# Patient Record
Sex: MALE | Race: WHITE | NOT HISPANIC OR LATINO | Employment: OTHER | URBAN - METROPOLITAN AREA
[De-identification: names, ages, dates, MRNs, and addresses within clinical notes are randomized per-mention and may not be internally consistent; named-entity substitution may affect disease eponyms.]

---

## 2023-08-24 ENCOUNTER — HOSPITAL ENCOUNTER (EMERGENCY)
Facility: HOSPITAL | Age: 78
Discharge: HOME | End: 2023-08-24
Attending: EMERGENCY MEDICINE
Payer: OTHER GOVERNMENT

## 2023-08-24 VITALS
HEIGHT: 66 IN | RESPIRATION RATE: 25 BRPM | OXYGEN SATURATION: 94 % | TEMPERATURE: 98 F | BODY MASS INDEX: 29.73 KG/M2 | SYSTOLIC BLOOD PRESSURE: 127 MMHG | WEIGHT: 185 LBS | HEART RATE: 79 BPM | DIASTOLIC BLOOD PRESSURE: 67 MMHG

## 2023-08-24 DIAGNOSIS — N30.00 ACUTE CYSTITIS WITHOUT HEMATURIA: Primary | ICD-10-CM

## 2023-08-24 LAB
ALBUMIN SERPL BCP-MCNC: 4.1 G/DL (ref 3.5–5.2)
ALP SERPL-CCNC: 85 U/L (ref 55–135)
ALT SERPL W/O P-5'-P-CCNC: 23 U/L (ref 10–44)
ANION GAP SERPL CALC-SCNC: 9 MMOL/L (ref 8–16)
AST SERPL-CCNC: 21 U/L (ref 10–40)
BACTERIA #/AREA URNS HPF: ABNORMAL /HPF
BASOPHILS # BLD AUTO: 0.07 K/UL (ref 0–0.2)
BASOPHILS NFR BLD: 0.6 % (ref 0–1.9)
BILIRUB SERPL-MCNC: 1.1 MG/DL (ref 0.1–1)
BILIRUB UR QL STRIP: NEGATIVE
BUN SERPL-MCNC: 17 MG/DL (ref 8–23)
CALCIUM SERPL-MCNC: 9.1 MG/DL (ref 8.7–10.5)
CHLORIDE SERPL-SCNC: 103 MMOL/L (ref 95–110)
CLARITY UR: ABNORMAL
CO2 SERPL-SCNC: 22 MMOL/L (ref 23–29)
COLOR UR: YELLOW
CREAT SERPL-MCNC: 1 MG/DL (ref 0.5–1.4)
DIFFERENTIAL METHOD: ABNORMAL
EOSINOPHIL # BLD AUTO: 0 K/UL (ref 0–0.5)
EOSINOPHIL NFR BLD: 0.3 % (ref 0–8)
ERYTHROCYTE [DISTWIDTH] IN BLOOD BY AUTOMATED COUNT: 13.2 % (ref 11.5–14.5)
EST. GFR  (NO RACE VARIABLE): >60 ML/MIN/1.73 M^2
GLUCOSE SERPL-MCNC: 147 MG/DL (ref 70–110)
GLUCOSE UR QL STRIP: ABNORMAL
HCT VFR BLD AUTO: 45.6 % (ref 40–54)
HGB BLD-MCNC: 15.2 G/DL (ref 14–18)
HGB UR QL STRIP: ABNORMAL
HYALINE CASTS #/AREA URNS LPF: 3 /LPF
IMM GRANULOCYTES # BLD AUTO: 0.06 K/UL (ref 0–0.04)
IMM GRANULOCYTES NFR BLD AUTO: 0.5 % (ref 0–0.5)
KETONES UR QL STRIP: ABNORMAL
LACTATE SERPL-SCNC: 1.2 MMOL/L (ref 0.5–1.9)
LEUKOCYTE ESTERASE UR QL STRIP: ABNORMAL
LIPASE SERPL-CCNC: 21 U/L (ref 4–60)
LYMPHOCYTES # BLD AUTO: 0.7 K/UL (ref 1–4.8)
LYMPHOCYTES NFR BLD: 5.5 % (ref 18–48)
MCH RBC QN AUTO: 28.9 PG (ref 27–31)
MCHC RBC AUTO-ENTMCNC: 33.3 G/DL (ref 32–36)
MCV RBC AUTO: 87 FL (ref 82–98)
MICROSCOPIC COMMENT: ABNORMAL
MONOCYTES # BLD AUTO: 0.9 K/UL (ref 0.3–1)
MONOCYTES NFR BLD: 7.3 % (ref 4–15)
NEUTROPHILS # BLD AUTO: 10.7 K/UL (ref 1.8–7.7)
NEUTROPHILS NFR BLD: 85.8 % (ref 38–73)
NITRITE UR QL STRIP: NEGATIVE
NRBC BLD-RTO: 0 /100 WBC
PH UR STRIP: 6 [PH] (ref 5–8)
PLATELET # BLD AUTO: 235 K/UL (ref 150–450)
PMV BLD AUTO: 9.8 FL (ref 9.2–12.9)
POTASSIUM SERPL-SCNC: 3.8 MMOL/L (ref 3.5–5.1)
PROT SERPL-MCNC: 8 G/DL (ref 6–8.4)
PROT UR QL STRIP: ABNORMAL
RBC # BLD AUTO: 5.26 M/UL (ref 4.6–6.2)
RBC #/AREA URNS HPF: 44 /HPF (ref 0–4)
SODIUM SERPL-SCNC: 134 MMOL/L (ref 136–145)
SP GR UR STRIP: 1.02 (ref 1–1.03)
SQUAMOUS #/AREA URNS HPF: 0 /HPF
URN SPEC COLLECT METH UR: ABNORMAL
UROBILINOGEN UR STRIP-ACNC: NEGATIVE EU/DL
WBC # BLD AUTO: 12.45 K/UL (ref 3.9–12.7)
WBC #/AREA URNS HPF: >100 /HPF (ref 0–5)
YEAST URNS QL MICRO: ABNORMAL

## 2023-08-24 PROCEDURE — 93010 EKG 12-LEAD: ICD-10-PCS | Mod: ,,, | Performed by: SPECIALIST

## 2023-08-24 PROCEDURE — 63600175 PHARM REV CODE 636 W HCPCS: Performed by: EMERGENCY MEDICINE

## 2023-08-24 PROCEDURE — 25000003 PHARM REV CODE 250: Performed by: EMERGENCY MEDICINE

## 2023-08-24 PROCEDURE — 93005 ELECTROCARDIOGRAM TRACING: CPT | Performed by: SPECIALIST

## 2023-08-24 PROCEDURE — 85025 COMPLETE CBC W/AUTO DIFF WBC: CPT

## 2023-08-24 PROCEDURE — 87040 BLOOD CULTURE FOR BACTERIA: CPT | Mod: 59 | Performed by: EMERGENCY MEDICINE

## 2023-08-24 PROCEDURE — 96366 THER/PROPH/DIAG IV INF ADDON: CPT

## 2023-08-24 PROCEDURE — 36415 COLL VENOUS BLD VENIPUNCTURE: CPT | Performed by: EMERGENCY MEDICINE

## 2023-08-24 PROCEDURE — 80053 COMPREHEN METABOLIC PANEL: CPT

## 2023-08-24 PROCEDURE — 83605 ASSAY OF LACTIC ACID: CPT | Performed by: EMERGENCY MEDICINE

## 2023-08-24 PROCEDURE — 87186 SC STD MICRODIL/AGAR DIL: CPT | Performed by: EMERGENCY MEDICINE

## 2023-08-24 PROCEDURE — 87086 URINE CULTURE/COLONY COUNT: CPT | Performed by: EMERGENCY MEDICINE

## 2023-08-24 PROCEDURE — 99285 EMERGENCY DEPT VISIT HI MDM: CPT | Mod: 25

## 2023-08-24 PROCEDURE — 25500020 PHARM REV CODE 255: Performed by: EMERGENCY MEDICINE

## 2023-08-24 PROCEDURE — 87077 CULTURE AEROBIC IDENTIFY: CPT | Performed by: EMERGENCY MEDICINE

## 2023-08-24 PROCEDURE — 83690 ASSAY OF LIPASE: CPT

## 2023-08-24 PROCEDURE — 93010 ELECTROCARDIOGRAM REPORT: CPT | Mod: ,,, | Performed by: SPECIALIST

## 2023-08-24 PROCEDURE — 81001 URINALYSIS AUTO W/SCOPE: CPT | Performed by: EMERGENCY MEDICINE

## 2023-08-24 PROCEDURE — 96365 THER/PROPH/DIAG IV INF INIT: CPT | Mod: 59

## 2023-08-24 RX ORDER — SODIUM CHLORIDE 9 MG/ML
1000 INJECTION, SOLUTION INTRAVENOUS
Status: COMPLETED | OUTPATIENT
Start: 2023-08-24 | End: 2023-08-24

## 2023-08-24 RX ORDER — ACETAMINOPHEN 325 MG/1
650 TABLET ORAL
Status: COMPLETED | OUTPATIENT
Start: 2023-08-24 | End: 2023-08-24

## 2023-08-24 RX ORDER — CEFUROXIME AXETIL 500 MG/1
500 TABLET ORAL 2 TIMES DAILY
Qty: 20 TABLET | Refills: 0 | Status: ON HOLD | OUTPATIENT
Start: 2023-08-24 | End: 2023-08-31 | Stop reason: HOSPADM

## 2023-08-24 RX ADMIN — PIPERACILLIN AND TAZOBACTAM 4.5 G: 4; .5 INJECTION, POWDER, LYOPHILIZED, FOR SOLUTION INTRAVENOUS; PARENTERAL at 10:08

## 2023-08-24 RX ADMIN — SODIUM CHLORIDE 1000 ML: 0.9 INJECTION, SOLUTION INTRAVENOUS at 10:08

## 2023-08-24 RX ADMIN — IOHEXOL 100 ML: 350 INJECTION, SOLUTION INTRAVENOUS at 10:08

## 2023-08-24 RX ADMIN — ACETAMINOPHEN 650 MG: 325 TABLET ORAL at 10:08

## 2023-08-24 NOTE — ED NOTES
STATES ABDOMINAL PAIN, FEVER, AND INTERMITTENT URINARY URGENCY AND FREQUENCY.  STATES CATHETER REMOVED SEVERAL DAYS AGO.

## 2023-08-24 NOTE — ED PROVIDER NOTES
Encounter Date: 8/24/2023       History     Chief Complaint   Patient presents with    Fever    Abdominal Pain     Dysuria without hematuria, recent urologic surgery/procedure on 16th. Wife reports fever this morning and no BM since before surgery.      Patient presents complaining of frequency, urgency.  Patient is status post recent TURP procedure approximately a little over 1 week ago.  Patient noticed increasing frequency and urgency yesterday and this morning got worse.  At the worst symptoms are mild-to-moderate.      Review of patient's allergies indicates:  No Known Allergies  No past medical history on file.  No past surgical history on file.  No family history on file.     Review of Systems   All other systems reviewed and are negative.      Physical Exam     Initial Vitals [08/24/23 0804]   BP Pulse Resp Temp SpO2   (!) 143/83 101 16 99.3 °F (37.4 °C) (!) 93 %      MAP       --         Physical Exam    Nursing note and vitals reviewed.  Constitutional: He appears well-developed and well-nourished. He is not diaphoretic. No distress.   Pleasant, polite.   HENT:   Head: Normocephalic and atraumatic.   Mouth/Throat: Oropharynx is clear and moist.   Eyes: EOM are normal.   Neck: Neck supple.   Normal range of motion.  Cardiovascular:  Intact distal pulses.           Pulmonary/Chest: No respiratory distress.   Musculoskeletal:         General: Normal range of motion.      Cervical back: Normal range of motion and neck supple.     Neurological: He is alert and oriented to person, place, and time. He has normal strength.   Skin: Skin is warm and dry.   Psychiatric: He has a normal mood and affect. His behavior is normal. Judgment and thought content normal.         ED Course   Procedures  Labs Reviewed   CBC W/ AUTO DIFFERENTIAL - Abnormal; Notable for the following components:       Result Value    Gran # (ANC) 10.7 (*)     Immature Grans (Abs) 0.06 (*)     Lymph # 0.7 (*)     Gran % 85.8 (*)     Lymph % 5.5  (*)     All other components within normal limits   COMPREHENSIVE METABOLIC PANEL - Abnormal; Notable for the following components:    Sodium 134 (*)     CO2 22 (*)     Glucose 147 (*)     Total Bilirubin 1.1 (*)     All other components within normal limits   URINALYSIS, REFLEX TO URINE CULTURE - Abnormal; Notable for the following components:    Appearance, UA Hazy (*)     Protein, UA 1+ (*)     Glucose, UA 4+ (*)     Ketones, UA 1+ (*)     Occult Blood UA 3+ (*)     Leukocytes, UA 3+ (*)     All other components within normal limits    Narrative:     Specimen Source->Urine   URINALYSIS MICROSCOPIC - Abnormal; Notable for the following components:    RBC, UA 44 (*)     WBC, UA >100 (*)     Hyaline Casts, UA 3 (*)     All other components within normal limits    Narrative:     Specimen Source->Urine   CULTURE, BLOOD   CULTURE, BLOOD   CULTURE, URINE   LIPASE   LACTIC ACID, PLASMA   URINALYSIS, REFLEX TO URINE CULTURE   LACTIC ACID, PLASMA        ECG Results              EKG 12-lead (In process)  Result time 08/24/23 09:19:12      In process by Interface, Lab In [facility] (08/24/23 09:19:12)                   Narrative:    Test Reason : K30,    Vent. Rate : 094 BPM     Atrial Rate : 094 BPM     P-R Int : 156 ms          QRS Dur : 086 ms      QT Int : 338 ms       P-R-T Axes : 040 -42 055 degrees     QTc Int : 422 ms    Normal sinus rhythm  Left axis deviation  Abnormal ECG  When compared with ECG of 25-OCT-2007 09:09,  Vent. rate has increased BY  35 BPM  The axis Shifted left    Referred By: [email or web address]   SELF           Confirmed By:                                   Imaging Results              CT Abdomen Pelvis With Contrast (Final result)  Result time 08/24/23 11:05:16      Final result by Hattie Hernandez MD (08/24/23 11:05:16)                   Narrative:    All CT scans at this facility used dose modulation, iterative reconstruction and/or weight-based dosing when appropriate to reduce radiation doses  as low  as reasonably achievable.    HISTORY: Abdominal abscess/infection suspected; post turp w abdominal pain    FINDINGS: Axial postcontrast imaging was performed with 100 cc Omnipaque 350 IV contrast .    CT ABDOMEN: The lung bases are clear. There is coronary artery calcification.  Liver: Normal enhancement. There are 2 indeterminate subcentimeter hypodense lesions within the dome of the liver which are too small to categorize and may represent small cysts or hemangiomas. There is no biliary duct dilatation.  Spleen: Normal in size and enhancement. There is a 5 mm hypodense lesion within the posterior spleen suggestive of small cysts  Pancreas: Normal  Gallbladder: Normal  Adrenal glands: Normal    Kidneys: Symmetric in size and enhancement. There are no renal stones or solid renal masses. There is a 4 cm well marginated simple right renal cysts.    There are no thick-walled or dilated bowel loops. There is colonic diverticulosis without diverticulitis.  There is no mesenteric or retroperitoneal adenopathy.  There is an infrarenal abdominal aortic aneurysm measuring 3.5 x 2.8 cm. There is diffuse vascular calcification of the aorta    The musculature is normal. There are no acute osseous abnormalities.  There is a small fat-containing umbilical hernia.    CT PELVIS: The bladder is distended. There is minimal air within the bladder. There is irregular wall thickening along the left side of the bladder. There is mild stranding in the adjacent fat.  Findings may be secondary to cystitis or recent surgery. Malignancy cannot be completely excluded.    The prostate gland is mildly enlarged. There is no pelvic adenopathy or free fluid.    There are small fat-containing right inguinal hernia.      IMPRESSION: Distended bladder with small amount of fluid presumably secondary to recent surgery. There is irregular wall thickening of the left side of the bladder with associated stranding in the adjacent fat. While this may be  secondary to cystitis and recent surgery malignancy cannot be completely excluded and correlation with cystoscopy is recommended    Colonic diverticulosis without diverticulitis    4 cm simple right renal cyst for which no further workup is recommended    Subcentimeter hypodense lesions within the dome of the liver most likely representing small cysts or hemangiomas. Outpatient follow-up ultrasound is recommended    3.5 x 2.8 cm infrarenal abdominal aortic aneurysm    Prostatic hypertrophy    Electronically signed by:  Hattie Hernandez MD  8/24/2023 11:05 AM CDT Workstation: [id number]                                     Medications   0.9%  NaCl infusion (1,000 mLs Intravenous New Bag 8/24/23 1053)   piperacillin-tazobactam 4.5 g in dextrose 5 % 100 mL IVPB (ready to mix) (4.5 g Intravenous New Bag 8/24/23 1054)   acetaminophen tablet 650 mg (650 mg Oral Given 8/24/23 1054)   iohexoL (OMNIPAQUE 350) injection 100 mL (100 mLs Intravenous Given 8/24/23 1015)     Medical Decision Making  Considerations include but are not limited to acute kidney injury, dehydration, electrolyte abnormalities, urinary tract infection, abscess, pyelonephritis, sepsis    [facility]    Patient presents for emergent evaluation of acute frequency and urgency that poses a threat to life and/or bodily function.    In the ED patient found to have acute urinary tract infection.    I ordered labs and personally reviewed them.  Labs significant for normal lactate of 1.2.     I ordered CT scan and personally reviewed it and reviewed the radiologist interpretation.  CT significant for evidence of cystitis and possible mass.      Discharge MDM    Patient was managed in the ED with IV Zosyn.    The response to treatment was improved.  Patient's CT imaging with evidence of cystitis and possible mass.  Patient has previously been diagnosed with prostate cancer.  Patient was advised she should follow up with his urologist.  He was given IV Zosyn in the  emergency department and does not appear to have any evidence of sepsis.  Patient's lactate is normal he has normal vital signs.  He was advised detailed reasons to return.  He was given an outpatient prescription for Ceftin.  Patient was discharged in stable condition.  Detailed return precautions discussed.    Amount and/or Complexity of Data Reviewed  Labs: ordered. Decision-making details documented in ED Course.  Radiology: ordered.    Risk  OTC drugs.  Prescription drug management.               ED Course as of 08/24/23 1250   Thu Aug 24, 2023   1113 WBC: 12.45 [AP]   1113 Hemoglobin: 15.2 [AP]   1113 Hematocrit: 45.6 [AP]   1113 Platelets: 235 [AP]   1113 Sodium(!): 134 [AP]   1113 Potassium: 3.8 [AP]   1113 Chloride: 103 [AP]   1113 CO2(!): 22 [AP]   1113 Glucose(!): 147 [AP]   1113 BUN: 17 [AP]   1113 Creatinine: 1.0 [AP]   1113 Calcium: 9.1 [AP]   1113 PROTEIN TOTAL: 8.0 [AP]   1113 Albumin: 4.1 [AP]   1113 BILIRUBIN TOTAL(!): 1.1 [AP]   1113 Alkaline Phosphatase: 85 [AP]   1113 AST: 21 [AP]   1113 ALT: 23 [AP]   1113 RBC, UA(!): 44 [AP]   1113 WBC, UA(!): >100 [AP]   1113 Bacteria, UA: Rare [AP]   1113 Yeast, UA: None [AP]   1113 Squam Epithel, UA: 0 [AP]   1113 Hyaline Casts, UA(!): 3 [AP]      ED Course User Index  [AP] Reggie Steen MD                    Clinical Impression:   Final diagnoses:  [N30.00] Acute cystitis without hematuria (Primary)        ED Disposition Condition    Discharge Stable          ED Prescriptions       Medication Sig Dispense Start Date End Date Auth. Provider    cefUROXime (CEFTIN) 500 MG tablet Take 1 tablet (500 mg total) by mouth 2 (two) times daily. for 10 days 20 tablet 8/24/2023 9/3/2023 Reggie Steen MD          Follow-up Information       Follow up With Specialties Details Why Contact Info    [facility] and [facility] within 1-2 days                 Reggie Steen MD  08/24/23 2437

## 2023-08-26 LAB — BACTERIA UR CULT: ABNORMAL

## 2023-08-27 ENCOUNTER — HOSPITAL ENCOUNTER (INPATIENT)
Facility: HOSPITAL | Age: 78
LOS: 2 days | Discharge: HOME HEALTH | DRG: 690 | End: 2023-08-31
Attending: EMERGENCY MEDICINE | Admitting: INTERNAL MEDICINE
Payer: OTHER GOVERNMENT

## 2023-08-27 DIAGNOSIS — R07.9 CHEST PAIN: ICD-10-CM

## 2023-08-27 DIAGNOSIS — N39.0 UTI (URINARY TRACT INFECTION): ICD-10-CM

## 2023-08-27 DIAGNOSIS — R41.82 ALTERED MENTAL STATUS, UNSPECIFIED ALTERED MENTAL STATUS TYPE: Primary | ICD-10-CM

## 2023-08-27 DIAGNOSIS — N39.0 URINARY TRACT INFECTION WITHOUT HEMATURIA, SITE UNSPECIFIED: ICD-10-CM

## 2023-08-27 DIAGNOSIS — R33.9 URINARY RETENTION: ICD-10-CM

## 2023-08-27 PROBLEM — I10 HYPERTENSION: Status: ACTIVE | Noted: 2023-08-27

## 2023-08-27 PROBLEM — I25.10 CAD (CORONARY ARTERY DISEASE): Status: ACTIVE | Noted: 2023-08-27

## 2023-08-27 PROBLEM — Z90.79 S/P TURP: Status: ACTIVE | Noted: 2023-08-27

## 2023-08-27 PROBLEM — I71.40 ABDOMINAL AORTIC ANEURYSM: Status: ACTIVE | Noted: 2023-08-27

## 2023-08-27 PROBLEM — N32.3 BLADDER DIVERTICULUM: Status: ACTIVE | Noted: 2023-08-27

## 2023-08-27 PROBLEM — G92.9 ENCEPHALOPATHY, TOXIC: Status: ACTIVE | Noted: 2023-08-27

## 2023-08-27 LAB
ALBUMIN SERPL BCP-MCNC: 3.4 G/DL (ref 3.5–5.2)
ALP SERPL-CCNC: 89 U/L (ref 55–135)
ALT SERPL W/O P-5'-P-CCNC: 39 U/L (ref 10–44)
ANION GAP SERPL CALC-SCNC: 6 MMOL/L (ref 8–16)
AST SERPL-CCNC: 32 U/L (ref 10–40)
BACTERIA #/AREA URNS HPF: ABNORMAL /HPF
BASOPHILS # BLD AUTO: 0.09 K/UL (ref 0–0.2)
BASOPHILS NFR BLD: 0.6 % (ref 0–1.9)
BILIRUB SERPL-MCNC: 1 MG/DL (ref 0.1–1)
BILIRUB UR QL STRIP: NEGATIVE
BUN SERPL-MCNC: 18 MG/DL (ref 8–23)
C DIFF GDH STL QL: NEGATIVE
C DIFF TOX A+B STL QL IA: NEGATIVE
CALCIUM SERPL-MCNC: 8.7 MG/DL (ref 8.7–10.5)
CHLORIDE SERPL-SCNC: 108 MMOL/L (ref 95–110)
CLARITY UR: CLEAR
CO2 SERPL-SCNC: 22 MMOL/L (ref 23–29)
COLOR UR: YELLOW
CREAT SERPL-MCNC: 0.8 MG/DL (ref 0.5–1.4)
DIFFERENTIAL METHOD: ABNORMAL
EOSINOPHIL # BLD AUTO: 0 K/UL (ref 0–0.5)
EOSINOPHIL NFR BLD: 0.3 % (ref 0–8)
ERYTHROCYTE [DISTWIDTH] IN BLOOD BY AUTOMATED COUNT: 13.2 % (ref 11.5–14.5)
ERYTHROCYTE [DISTWIDTH] IN BLOOD BY AUTOMATED COUNT: 13.3 % (ref 11.5–14.5)
EST. GFR  (NO RACE VARIABLE): >60 ML/MIN/1.73 M^2
GLUCOSE SERPL-MCNC: 115 MG/DL (ref 70–110)
GLUCOSE UR QL STRIP: ABNORMAL
HCT VFR BLD AUTO: 41.9 % (ref 40–54)
HCT VFR BLD AUTO: 42.1 % (ref 40–54)
HGB BLD-MCNC: 14.2 G/DL (ref 14–18)
HGB BLD-MCNC: 14.2 G/DL (ref 14–18)
HGB UR QL STRIP: ABNORMAL
HYALINE CASTS #/AREA URNS LPF: 2 /LPF
IMM GRANULOCYTES # BLD AUTO: 0.1 K/UL (ref 0–0.04)
IMM GRANULOCYTES NFR BLD AUTO: 0.6 % (ref 0–0.5)
KETONES UR QL STRIP: ABNORMAL
LACTATE SERPL-SCNC: 1.2 MMOL/L (ref 0.5–1.9)
LEUKOCYTE ESTERASE UR QL STRIP: ABNORMAL
LIPASE SERPL-CCNC: 28 U/L (ref 4–60)
LYMPHOCYTES # BLD AUTO: 1.5 K/UL (ref 1–4.8)
LYMPHOCYTES NFR BLD: 9.4 % (ref 18–48)
MCH RBC QN AUTO: 29.2 PG (ref 27–31)
MCH RBC QN AUTO: 29.3 PG (ref 27–31)
MCHC RBC AUTO-ENTMCNC: 33.7 G/DL (ref 32–36)
MCHC RBC AUTO-ENTMCNC: 33.9 G/DL (ref 32–36)
MCV RBC AUTO: 86 FL (ref 82–98)
MCV RBC AUTO: 87 FL (ref 82–98)
MICROSCOPIC COMMENT: ABNORMAL
MONOCYTES # BLD AUTO: 1.4 K/UL (ref 0.3–1)
MONOCYTES NFR BLD: 8.7 % (ref 4–15)
NEUTROPHILS # BLD AUTO: 12.8 K/UL (ref 1.8–7.7)
NEUTROPHILS NFR BLD: 80.4 % (ref 38–73)
NITRITE UR QL STRIP: NEGATIVE
NRBC BLD-RTO: 0 /100 WBC
OB PNL STL: NEGATIVE
PH UR STRIP: 6 [PH] (ref 5–8)
PLATELET # BLD AUTO: 289 K/UL (ref 150–450)
PLATELET # BLD AUTO: 300 K/UL (ref 150–450)
PMV BLD AUTO: 10.1 FL (ref 9.2–12.9)
PMV BLD AUTO: 10.6 FL (ref 9.2–12.9)
POTASSIUM SERPL-SCNC: 3.5 MMOL/L (ref 3.5–5.1)
PROT SERPL-MCNC: 7.6 G/DL (ref 6–8.4)
PROT UR QL STRIP: ABNORMAL
RBC # BLD AUTO: 4.85 M/UL (ref 4.6–6.2)
RBC # BLD AUTO: 4.86 M/UL (ref 4.6–6.2)
RBC #/AREA URNS HPF: 3 /HPF (ref 0–4)
SODIUM SERPL-SCNC: 136 MMOL/L (ref 136–145)
SP GR UR STRIP: 1.03 (ref 1–1.03)
SQUAMOUS #/AREA URNS HPF: 0 /HPF
URN SPEC COLLECT METH UR: ABNORMAL
UROBILINOGEN UR STRIP-ACNC: NEGATIVE EU/DL
WBC # BLD AUTO: 14.96 K/UL (ref 3.9–12.7)
WBC # BLD AUTO: 15.92 K/UL (ref 3.9–12.7)
WBC #/AREA STL HPF: ABNORMAL /[HPF]
WBC #/AREA URNS HPF: 44 /HPF (ref 0–5)
YEAST URNS QL MICRO: ABNORMAL

## 2023-08-27 PROCEDURE — 87449 NOS EACH ORGANISM AG IA: CPT | Mod: 91 | Performed by: INTERNAL MEDICINE

## 2023-08-27 PROCEDURE — 85027 COMPLETE CBC AUTOMATED: CPT | Performed by: INTERNAL MEDICINE

## 2023-08-27 PROCEDURE — 87209 SMEAR COMPLEX STAIN: CPT | Performed by: INTERNAL MEDICINE

## 2023-08-27 PROCEDURE — 99285 EMERGENCY DEPT VISIT HI MDM: CPT | Mod: 25

## 2023-08-27 PROCEDURE — 81001 URINALYSIS AUTO W/SCOPE: CPT | Performed by: EMERGENCY MEDICINE

## 2023-08-27 PROCEDURE — 63600175 PHARM REV CODE 636 W HCPCS: Performed by: INTERNAL MEDICINE

## 2023-08-27 PROCEDURE — 87045 FECES CULTURE AEROBIC BACT: CPT | Performed by: INTERNAL MEDICINE

## 2023-08-27 PROCEDURE — 96374 THER/PROPH/DIAG INJ IV PUSH: CPT

## 2023-08-27 PROCEDURE — G0378 HOSPITAL OBSERVATION PER HR: HCPCS

## 2023-08-27 PROCEDURE — 25500020 PHARM REV CODE 255: Performed by: EMERGENCY MEDICINE

## 2023-08-27 PROCEDURE — 80053 COMPREHEN METABOLIC PANEL: CPT | Performed by: EMERGENCY MEDICINE

## 2023-08-27 PROCEDURE — 87040 BLOOD CULTURE FOR BACTERIA: CPT | Performed by: EMERGENCY MEDICINE

## 2023-08-27 PROCEDURE — 82272 OCCULT BLD FECES 1-3 TESTS: CPT | Performed by: INTERNAL MEDICINE

## 2023-08-27 PROCEDURE — 25000003 PHARM REV CODE 250: Performed by: EMERGENCY MEDICINE

## 2023-08-27 PROCEDURE — 83690 ASSAY OF LIPASE: CPT | Performed by: EMERGENCY MEDICINE

## 2023-08-27 PROCEDURE — 25000003 PHARM REV CODE 250: Performed by: INTERNAL MEDICINE

## 2023-08-27 PROCEDURE — 87186 SC STD MICRODIL/AGAR DIL: CPT | Performed by: EMERGENCY MEDICINE

## 2023-08-27 PROCEDURE — 87086 URINE CULTURE/COLONY COUNT: CPT | Performed by: EMERGENCY MEDICINE

## 2023-08-27 PROCEDURE — 89055 LEUKOCYTE ASSESSMENT FECAL: CPT | Performed by: INTERNAL MEDICINE

## 2023-08-27 PROCEDURE — 85025 COMPLETE CBC W/AUTO DIFF WBC: CPT | Performed by: EMERGENCY MEDICINE

## 2023-08-27 PROCEDURE — 36415 COLL VENOUS BLD VENIPUNCTURE: CPT | Performed by: EMERGENCY MEDICINE

## 2023-08-27 PROCEDURE — 83605 ASSAY OF LACTIC ACID: CPT | Performed by: EMERGENCY MEDICINE

## 2023-08-27 PROCEDURE — 87077 CULTURE AEROBIC IDENTIFY: CPT | Performed by: EMERGENCY MEDICINE

## 2023-08-27 PROCEDURE — 87324 CLOSTRIDIUM AG IA: CPT | Performed by: INTERNAL MEDICINE

## 2023-08-27 PROCEDURE — 87046 STOOL CULTR AEROBIC BACT EA: CPT | Mod: 59 | Performed by: INTERNAL MEDICINE

## 2023-08-27 RX ORDER — NALOXONE HCL 0.4 MG/ML
0.02 VIAL (ML) INJECTION
Status: DISCONTINUED | OUTPATIENT
Start: 2023-08-27 | End: 2023-09-01 | Stop reason: HOSPADM

## 2023-08-27 RX ORDER — OXYCODONE AND ACETAMINOPHEN 5; 325 MG/1; MG/1
1 TABLET ORAL ONCE
Status: COMPLETED | OUTPATIENT
Start: 2023-08-27 | End: 2023-08-27

## 2023-08-27 RX ORDER — SODIUM CHLORIDE 0.9 % (FLUSH) 0.9 %
10 SYRINGE (ML) INJECTION EVERY 12 HOURS PRN
Status: DISCONTINUED | OUTPATIENT
Start: 2023-08-27 | End: 2023-09-01 | Stop reason: HOSPADM

## 2023-08-27 RX ORDER — IBUPROFEN 200 MG
24 TABLET ORAL
Status: DISCONTINUED | OUTPATIENT
Start: 2023-08-27 | End: 2023-09-01 | Stop reason: HOSPADM

## 2023-08-27 RX ORDER — PHENAZOPYRIDINE HYDROCHLORIDE 100 MG/1
100 TABLET, FILM COATED ORAL 3 TIMES DAILY PRN
COMMUNITY
End: 2024-02-23

## 2023-08-27 RX ORDER — IBUPROFEN 200 MG
200 TABLET ORAL EVERY 6 HOURS PRN
COMMUNITY

## 2023-08-27 RX ORDER — ASPIRIN 81 MG/1
81 TABLET ORAL DAILY
Status: DISCONTINUED | OUTPATIENT
Start: 2023-08-28 | End: 2023-09-01 | Stop reason: HOSPADM

## 2023-08-27 RX ORDER — SODIUM CHLORIDE 9 MG/ML
1000 INJECTION, SOLUTION INTRAVENOUS
Status: COMPLETED | OUTPATIENT
Start: 2023-08-27 | End: 2023-08-27

## 2023-08-27 RX ORDER — ATORVASTATIN CALCIUM 80 MG/1
80 TABLET, FILM COATED ORAL DAILY
COMMUNITY

## 2023-08-27 RX ORDER — IBUPROFEN 200 MG
16 TABLET ORAL
Status: DISCONTINUED | OUTPATIENT
Start: 2023-08-27 | End: 2023-09-01 | Stop reason: HOSPADM

## 2023-08-27 RX ORDER — AMLODIPINE BESYLATE 5 MG/1
5 TABLET ORAL DAILY
Status: DISCONTINUED | OUTPATIENT
Start: 2023-08-28 | End: 2023-09-01 | Stop reason: HOSPADM

## 2023-08-27 RX ORDER — ACETAMINOPHEN 500 MG
500 TABLET ORAL EVERY 6 HOURS PRN
Status: DISCONTINUED | OUTPATIENT
Start: 2023-08-27 | End: 2023-09-01 | Stop reason: HOSPADM

## 2023-08-27 RX ORDER — PHENAZOPYRIDINE HYDROCHLORIDE 100 MG/1
100 TABLET, FILM COATED ORAL 3 TIMES DAILY PRN
Status: DISCONTINUED | OUTPATIENT
Start: 2023-08-27 | End: 2023-09-01 | Stop reason: HOSPADM

## 2023-08-27 RX ORDER — ATORVASTATIN CALCIUM 40 MG/1
80 TABLET, FILM COATED ORAL DAILY
Status: DISCONTINUED | OUTPATIENT
Start: 2023-08-27 | End: 2023-09-01 | Stop reason: HOSPADM

## 2023-08-27 RX ORDER — ASPIRIN 81 MG/1
81 TABLET ORAL DAILY
COMMUNITY

## 2023-08-27 RX ORDER — METOPROLOL SUCCINATE 25 MG/1
25 TABLET, EXTENDED RELEASE ORAL DAILY
Status: DISCONTINUED | OUTPATIENT
Start: 2023-08-28 | End: 2023-09-01 | Stop reason: HOSPADM

## 2023-08-27 RX ORDER — SODIUM CHLORIDE 9 MG/ML
INJECTION, SOLUTION INTRAVENOUS CONTINUOUS
Status: DISCONTINUED | OUTPATIENT
Start: 2023-08-27 | End: 2023-09-01 | Stop reason: HOSPADM

## 2023-08-27 RX ORDER — ASPIRIN 81 MG/1
81 TABLET ORAL DAILY
Status: ON HOLD | COMMUNITY
End: 2023-08-27

## 2023-08-27 RX ORDER — AMLODIPINE BESYLATE 10 MG/1
5 TABLET ORAL DAILY
COMMUNITY

## 2023-08-27 RX ORDER — OXYCODONE AND ACETAMINOPHEN 5; 325 MG/1; MG/1
1 TABLET ORAL EVERY 6 HOURS PRN
Status: DISCONTINUED | OUTPATIENT
Start: 2023-08-27 | End: 2023-08-31

## 2023-08-27 RX ORDER — GLUCAGON 1 MG
1 KIT INJECTION
Status: DISCONTINUED | OUTPATIENT
Start: 2023-08-27 | End: 2023-09-01 | Stop reason: HOSPADM

## 2023-08-27 RX ORDER — ACETAMINOPHEN 500 MG
500 TABLET ORAL EVERY 6 HOURS PRN
COMMUNITY

## 2023-08-27 RX ORDER — METOPROLOL SUCCINATE 25 MG/1
25 TABLET, EXTENDED RELEASE ORAL DAILY
COMMUNITY

## 2023-08-27 RX ORDER — OXYCODONE AND ACETAMINOPHEN 5; 325 MG/1; MG/1
1 TABLET ORAL EVERY 6 HOURS PRN
COMMUNITY

## 2023-08-27 RX ADMIN — OXYCODONE AND ACETAMINOPHEN 1 TABLET: 325; 5 TABLET ORAL at 06:08

## 2023-08-27 RX ADMIN — SODIUM CHLORIDE: 0.9 INJECTION, SOLUTION INTRAVENOUS at 05:08

## 2023-08-27 RX ADMIN — IOHEXOL 100 ML: 350 INJECTION, SOLUTION INTRAVENOUS at 02:08

## 2023-08-27 RX ADMIN — ATORVASTATIN CALCIUM 80 MG: 40 TABLET, FILM COATED ORAL at 08:08

## 2023-08-27 RX ADMIN — SODIUM CHLORIDE 1000 ML: 0.9 INJECTION, SOLUTION INTRAVENOUS at 12:08

## 2023-08-27 RX ADMIN — PIPERACILLIN SODIUM AND TAZOBACTAM SODIUM 4.5 G: 4; .5 INJECTION, POWDER, LYOPHILIZED, FOR SOLUTION INTRAVENOUS at 05:08

## 2023-08-27 NOTE — H&P
[facility] Medicine  History & Physical    Patient Name: Arcadio Begum  MRN: [id number]  Patient Class: OP- Observation  Admission Date: 8/27/2023  Attending Physician: Ramses Ribeiro MD   Primary Care Provider: [facility]         Patient information was obtained from patient and ER records.     Subjective:     Principal Problem:UTI (urinary tract infection)    Chief Complaint:   Chief Complaint   Patient presents with    Diarrhea    Dysuria        HPI: 78 M with HTN , CAD  came to ER today with AMS and mild SPA pain .  He [facility] and got Dx of stage 1 Bladder Ca and planning to do chemo next week as per wife . He was DC with U cath post procedure and it was out few days back .  But developed urine  frequency, urgency and fever as high as 102.2 at home with Mild AMS and came to ER today . In fact he was here in ER few days back and DC with Ceftin but never get better .  CT abdomen was done large bladder diverticulum. Eccentric left posterolateral bladder wall thickening and mucosal hyperenhancement could be of infectious or inflammatory and possible post operative findings .  PMH with HTN , had CP and LHC last year but stent was not placed because of difficult position of vessels.   past surgical with inguinal hernia repair   No family history           History reviewed. No pertinent past medical history.    History reviewed. No pertinent surgical history.    Review of patient's allergies indicates:  No Known Allergies    No current facility-administered medications on file prior to encounter.     Current Outpatient Medications on File Prior to Encounter   Medication Sig    aspirin (ECOTRIN) 81 MG EC tablet Take 81 mg by mouth once daily.    cefUROXime (CEFTIN) 500 MG tablet Take 1 tablet (500 mg total) by mouth 2 (two) times daily. for 10 days     Family History    None       Tobacco Use    Smoking status: Not on file    Smokeless  tobacco: Not on file   Substance and Sexual Activity    Alcohol use: Not Currently    Drug use: Not on file    Sexual activity: Not Currently     Review of Systems   Constitutional:  Positive for fever. Negative for activity change, appetite change and diaphoresis.   HENT:  Negative for congestion, facial swelling and sinus pressure.    Respiratory:  Negative for shortness of breath and wheezing.    Cardiovascular:  Negative for chest pain and palpitations.   Gastrointestinal:  Negative for abdominal distention and abdominal pain.   Genitourinary:  Negative for dysuria.   Skin:  Negative for color change and pallor.   Neurological:  Negative for dizziness, facial asymmetry, speech difficulty and headaches.   Psychiatric/Behavioral:  Positive for confusion. Negative for agitation.      Objective:     Vital Signs (Most Recent):  Temp: 97.7 °F (36.5 °C) (08/27/23 1020)  Pulse: 74 (08/27/23 1600)  Resp: 18 (08/27/23 1020)  BP: 136/84 (08/27/23 1600)  SpO2: 96 % (08/27/23 1600) Vital Signs (24h Range):  Temp:  [97.7 °F (36.5 °C)] 97.7 °F (36.5 °C)  Pulse:  [74-81] 74  Resp:  [18] 18  SpO2:  [95 %-96 %] 96 %  BP: (136-137)/(84-94) 136/84     Weight: 99.8 kg (220 lb)  Body mass index is 35.51 kg/m².     Physical Exam  Constitutional:       General: He is not in acute distress.     Appearance: He is well-developed.   HENT:      Head: Normocephalic.   Eyes:      Pupils: Pupils are equal, round, and reactive to light.   Cardiovascular:      Rate and Rhythm: Normal rate and regular rhythm.      Pulses: Normal pulses.   Pulmonary:      Effort: Pulmonary effort is normal. No respiratory distress.   Abdominal:      General: Abdomen is flat. There is no distension.      Tenderness: There is no abdominal tenderness.   Musculoskeletal:      Cervical back: Neck supple.   Skin:     General: Skin is warm.      Findings: No rash.   Neurological:      Mental Status: He is alert and oriented to person, place, and time.   Psychiatric:          Mood and Affect: Mood normal.              CRANIAL NERVES     CN III, IV, VI   Pupils are equal, round, and reactive to light.       Significant Labs: All pertinent labs within the past 24 hours have been reviewed.  CBC:   Recent Labs   Lab 08/27/23  1157   WBC 15.92*   HGB 14.2   HCT 41.9        CMP:   Recent Labs   Lab 08/27/23  1157      K 3.5      CO2 22*   *   BUN 18   CREATININE 0.8   CALCIUM 8.7   PROT 7.6   ALBUMIN 3.4*   BILITOT 1.0   ALKPHOS 89   AST 32   ALT 39   ANIONGAP 6*       Significant Imaging: I have reviewed all pertinent imaging results/findings within the past 24 hours.    Assessment/Plan:     * UTI (urinary tract infection)  Zosyn   Urine and blood culture   Urology consult given recent TURP and bladder Ca Dx      Bladder diverticulum  In CT scan today . Not sure post op clot   Close monitor urine output   IVF   Uro Cx    S/P TURP  Aware  Get document from [facility]  dr julianne cross urology       Encephalopathy, toxic  Secondary to UTI   CT head neg  IVF   antibiotics       Abdominal aortic aneurysm 3 cm   Repeat US and monitor       CAD (coronary artery disease)  Aware  Home meds      Hypertension  Resume home meds  Need reconciliation when ready       VTE Risk Mitigation (From admission, onward)         Ordered     IP VTE HIGH RISK PATIENT  Once         08/27/23 1526     Place sequential compression device  Until discontinued         08/27/23 1526                   On 08/27/2023, patient should be placed in hospital observation services under my care.        Ramses Ribeiro MD  Department of Hospital Medicine  [facility]

## 2023-08-27 NOTE — SUBJECTIVE & OBJECTIVE
History reviewed. No pertinent past medical history.    History reviewed. No pertinent surgical history.    Review of patient's allergies indicates:  No Known Allergies    No current facility-administered medications on file prior to encounter.     Current Outpatient Medications on File Prior to Encounter   Medication Sig    aspirin (ECOTRIN) 81 MG EC tablet Take 81 mg by mouth once daily.    cefUROXime (CEFTIN) 500 MG tablet Take 1 tablet (500 mg total) by mouth 2 (two) times daily. for 10 days     Family History    None       Tobacco Use    Smoking status: Not on file    Smokeless tobacco: Not on file   Substance and Sexual Activity    Alcohol use: Not Currently    Drug use: Not on file    Sexual activity: Not Currently     Review of Systems   Constitutional:  Positive for fever. Negative for activity change, appetite change and diaphoresis.   HENT:  Negative for congestion, facial swelling and sinus pressure.    Respiratory:  Negative for shortness of breath and wheezing.    Cardiovascular:  Negative for chest pain and palpitations.   Gastrointestinal:  Negative for abdominal distention and abdominal pain.   Genitourinary:  Negative for dysuria.   Skin:  Negative for color change and pallor.   Neurological:  Negative for dizziness, facial asymmetry, speech difficulty and headaches.   Psychiatric/Behavioral:  Positive for confusion. Negative for agitation.      Objective:     Vital Signs (Most Recent):  Temp: 97.7 °F (36.5 °C) (08/27/23 1020)  Pulse: 74 (08/27/23 1600)  Resp: 18 (08/27/23 1020)  BP: 136/84 (08/27/23 1600)  SpO2: 96 % (08/27/23 1600) Vital Signs (24h Range):  Temp:  [97.7 °F (36.5 °C)] 97.7 °F (36.5 °C)  Pulse:  [74-81] 74  Resp:  [18] 18  SpO2:  [95 %-96 %] 96 %  BP: (136-137)/(84-94) 136/84     Weight: 99.8 kg (220 lb)  Body mass index is 35.51 kg/m².     Physical Exam  Constitutional:       General: He is not in acute distress.     Appearance: He is well-developed.   HENT:      Head:  Normocephalic.   Eyes:      Pupils: Pupils are equal, round, and reactive to light.   Cardiovascular:      Rate and Rhythm: Normal rate and regular rhythm.      Pulses: Normal pulses.   Pulmonary:      Effort: Pulmonary effort is normal. No respiratory distress.   Abdominal:      General: Abdomen is flat. There is no distension.      Tenderness: There is no abdominal tenderness.   Musculoskeletal:      Cervical back: Neck supple.   Skin:     General: Skin is warm.      Findings: No rash.   Neurological:      Mental Status: He is alert and oriented to person, place, and time.   Psychiatric:         Mood and Affect: Mood normal.              CRANIAL NERVES     CN III, IV, VI   Pupils are equal, round, and reactive to light.       Significant Labs: All pertinent labs within the past 24 hours have been reviewed.  CBC:   Recent Labs   Lab 08/27/23  1157   WBC 15.92*   HGB 14.2   HCT 41.9        CMP:   Recent Labs   Lab 08/27/23  1157      K 3.5      CO2 22*   *   BUN 18   CREATININE 0.8   CALCIUM 8.7   PROT 7.6   ALBUMIN 3.4*   BILITOT 1.0   ALKPHOS 89   AST 32   ALT 39   ANIONGAP 6*       Significant Imaging: I have reviewed all pertinent imaging results/findings within the past 24 hours.

## 2023-08-27 NOTE — ED PROVIDER NOTES
Encounter Date: 8/27/2023       History     Chief Complaint   Patient presents with    Diarrhea    Dysuria     Patient presents complaining of diarrhea and dysuria.  Patient was evaluated by myself a few days ago with similar symptoms.  Patient is status post recent TURP.  Patient was treated by myself with IV antibiotics and then oral antibiotic therapy he returns complaining of continued dysuria.  Family also concerned because patient seems to having worsening of his memory.  Patient describes nonbloody diarrhea.  At the worst symptoms are moderate.      Review of patient's allergies indicates:  No Known Allergies  History reviewed. No pertinent past medical history.  History reviewed. No pertinent surgical history.  History reviewed. No pertinent family history.  Social History     Substance Use Topics    Alcohol use: Not Currently     Review of Systems   All other systems reviewed and are negative.      Physical Exam     Initial Vitals [08/27/23 1020]   BP Pulse Resp Temp SpO2   (!) 137/94 81 18 97.7 °F (36.5 °C) 95 %      MAP       --         Physical Exam    Nursing note and vitals reviewed.  Constitutional: He appears well-developed and well-nourished. He is not diaphoretic. No distress.   Pleasant, polite.   HENT:   Head: Normocephalic and atraumatic.   Eyes: EOM are normal.   Neck: Neck supple.   Normal range of motion.  Cardiovascular:  Normal rate, regular rhythm, normal heart sounds and intact distal pulses.           Pulmonary/Chest: Breath sounds normal. No respiratory distress.   Musculoskeletal:         General: Normal range of motion.      Cervical back: Normal range of motion and neck supple.     Neurological: He is alert and oriented to person, place, and time. No cranial nerve deficit.   Skin: Skin is warm and dry.   Psychiatric: He has a normal mood and affect. His behavior is normal. Judgment and thought content normal.         ED Course   Procedures  Labs Reviewed   CBC W/ AUTO DIFFERENTIAL -  Abnormal; Notable for the following components:       Result Value    WBC 15.92 (*)     Immature Granulocytes 0.6 (*)     Gran # (ANC) 12.8 (*)     Immature Grans (Abs) 0.10 (*)     Mono # 1.4 (*)     Gran % 80.4 (*)     Lymph % 9.4 (*)     All other components within normal limits   COMPREHENSIVE METABOLIC PANEL - Abnormal; Notable for the following components:    CO2 22 (*)     Glucose 115 (*)     Albumin 3.4 (*)     Anion Gap 6 (*)     All other components within normal limits   URINALYSIS, REFLEX TO URINE CULTURE - Abnormal; Notable for the following components:    Protein, UA Trace (*)     Glucose, UA 4+ (*)     Ketones, UA 2+ (*)     Occult Blood UA 2+ (*)     Leukocytes, UA 1+ (*)     All other components within normal limits    Narrative:     Specimen Source->Urine   URINALYSIS MICROSCOPIC - Abnormal; Notable for the following components:    WBC, UA 44 (*)     Yeast, UA Rare (*)     Hyaline Casts, UA 2 (*)     All other components within normal limits    Narrative:     Specimen Source->Urine   CULTURE, URINE   LIPASE          Imaging Results              CT Abdomen Pelvis With Contrast (Final result)  Result time 08/27/23 15:11:04      Final result by Richard Hughes MD (08/27/23 15:11:04)                   Narrative:    CMS MANDATED QUALITY DATA - CT RADIATION - 436    All CT scans at this facility utilize dose modulation, iterative reconstruction, and/or weight based dosing when appropriate to reduce radiation dose to as low as reasonably achievable.        Reason: Abdominal abscess/infection suspected    TECHNIQUE: CT abdomen and pelvis with 100 mL Omnipaque 350.    COMPARISON: 8/24/2023    CT ABDOMEN:  Coronary artery calcifications are present. Visualized lung bases are clear.    Tiny hiatal hernia is present. Unchanged hepatic hypodensities suggest cysts, with liver showing no new abnormality. Gallbladder, pancreas, and adrenals are normal. Tiny splenic hypodensity is unchanged, of doubtful clinical  significance.    Right renal cyst unchanged. Kidneys otherwise normal. Abdominal aorta reaches 3 cm in diameter, unchanged. Mild aortoiliac atherosclerotic plaque is present.    Diverticula arise from the colon. Small appendiceal stump appears normal, with surgical clip suggested adjacent to this. Small intestines are unremarkable. No free intraperitoneal gas. Small fat-containing periumbilical hernias are present.    Mild degenerative changes affect the spine. No acute osseous abnormality.    CT PELVIS:  Prostate remains enlarged. Gas in bladder suggests recent Allison catheterization, unchanged. Wide necked outpouching of posterior bladder wall measures up to 5.4 cm in size, unchanged. Eccentric left posterolateral bladder wall thickening and mucosal hyperenhancement is unchanged. Small fat-containing right inguinal hernia unchanged. No enlarged pelvic lymph nodes and no free pelvic fluid. No acute osseous abnormality.    IMPRESSION:    1. Coronary artery calcifications.  2. Hiatal hernia.  3. Abdominal aortic aneurysm reaching 3 cm in diameter. Imaging follow-up recommended at 3 year intervals.  4. Diverticulosis.  5. Enlarged prostate.  6. Wide necked outpouching of posterior bladder wall suggesting large bladder diverticulum.  7. Eccentric left posterolateral bladder wall thickening and mucosal hyperenhancement could be of infectious or inflammatory etiology although bladder malignancy is a consideration. Cystoscopy would be needed to exclude bladder malignancy and is recommended. #8 small fat-containing right inguinal hernia and tiny fat-containing periumbilical hernias.    Electronically signed by:  Richard Hughes MD  8/27/2023 3:11 PM CDT Workstation: [id number]                                     CT Head Without Contrast (Final result)  Result time 08/27/23 14:59:52      Final result by Richard Hughes MD (08/27/23 14:59:52)                   Narrative:    CMS MANDATED QUALITY DATA - CT RADIATION - 436    All  CT scans at this facility utilize dose modulation, iterative reconstruction, and/or weight based dosing when appropriate to reduce radiation dose to as low as reasonably achievable.          Reason: Mental status change, unknown cause    TECHNIQUE: Head CT without IV contrast.    COMPARISON: None    FINDINGS:  Gray-white differentiation is maintained without hemorrhage, midline shift, or mass effect.  Periventricular white matter low-attenuation bilaterally suggest chronic small vessel ischemic changes. Diffuse cerebral and cerebellar atrophy is evident.    The ventricles and cisterns are maintained.    Calvarium is intact. Polypoid mucosal thickening is mild in inferior right maxillary sinus.    IMPRESSION:  1. No acute intracranial abnormality.  2. Chronic small vessel ischemic changes.    Electronically signed by:  Richard Hughes MD  8/27/2023 2:59 PM CDT Workstation: [id number]                                     Medications   piperacillin-tazobactam 3.375 g in dextrose 5 % 100 mL IVPB (ready to mix) (has no administration in time range)   sodium chloride 0.9% flush 10 mL (has no administration in time range)   naloxone 0.4 mg/mL injection 0.02 mg (has no administration in time range)   glucose chewable tablet 16 g (has no administration in time range)   glucose chewable tablet 24 g (has no administration in time range)   dextrose 50% injection 12.5 g (has no administration in time range)   dextrose 50% injection 25 g (has no administration in time range)   glucagon (human recombinant) injection 1 mg (has no administration in time range)   0.9%  NaCl infusion ( Intravenous New Bag 8/27/23 1738)   aspirin EC tablet 81 mg (has no administration in time range)   0.9%  NaCl infusion (1,000 mLs Intravenous New Bag 8/27/23 1200)   iohexoL (OMNIPAQUE 350) injection 100 mL (100 mLs Intravenous Given 8/27/23 1435)   piperacillin-tazobactam 4.5 g in dextrose 5 % 100 mL IVPB (ready to mix) (4.5 g Intravenous New Bag 8/27/23  1737)   oxyCODONE-acetaminophen 5-325 mg per tablet 1 tablet (1 tablet Oral Given 8/27/23 1188)     Medical Decision Making  Patient appears in no acute distress    Considerations include but are not limited to acute kidney injury, dehydration, electrolyte abnormalities, sepsis, UTI, infectious diarrhea, C diff    In the emergency department patient was treated with IV antibiotic therapy, IV fluids.  Patient was found to have elevated white blood cell count however lactate is normal.  Because of elevation of white blood cell count we will treat patient in house.  Patient remained stable.  He was consulted hospital Medicine who agrees to admit the patient.  The CT scan shows no evidence of any new findings.  There is no evidence of any abscess formation.  Head CT shows no acute findings.  I suspect UTI is causing worsening memory problems for the patient.  At baseline patient has had problems with his memory.    Amount and/or Complexity of Data Reviewed  Labs: ordered.  Radiology: ordered.    Risk  Prescription drug management.                               Clinical Impression:   Final diagnoses:  [R41.82] Altered mental status, unspecified altered mental status type (Primary)  [N39.0] Urinary tract infection without hematuria, site unspecified        ED Disposition Condition    Observation                 Reggie Steen MD  08/27/23 9944

## 2023-08-27 NOTE — NURSING
Nurses Note -- 4 Eyes      8/27/2023   6:54 PM      Skin assessed during: Admit      [x] No Altered Skin Integrity Present    []Prevention Measures Documented      [] Yes- Altered Skin Integrity Present or Discovered   [] LDA Added if Not in Epic (Describe Wound)   [] New Altered Skin Integrity was Present on Admit and Documented in LDA   [] Wound Image Taken    Wound Care Consulted? No    Attending Nurse:  Sarah Ac RN/Staff Member:   Clarissa Galindo

## 2023-08-27 NOTE — HPI
78 M with HTN , CAD  came to ER today with AMS and mild SPA pain .  He [facility] and got Dx of stage 1 Bladder Ca and planning to do chemo next week as per wife . He was DC with U cath post procedure and it was out few days back .  But developed urine  frequency, urgency and fever as high as 102.2 at home with Mild AMS and came to ER today . In fact he was here in ER few days back and DC with Ceftin but never get better .  CT abdomen was done large bladder diverticulum. Eccentric left posterolateral bladder wall thickening and mucosal hyperenhancement could be of infectious or inflammatory and possible post operative findings .  PMH with HTN , had CP and LHC last year but stent was not placed because of difficult position of vessels.   past surgical with inguinal hernia repair   No family history

## 2023-08-28 PROBLEM — R33.9 URINARY RETENTION: Status: ACTIVE | Noted: 2023-08-28

## 2023-08-28 LAB
ANION GAP SERPL CALC-SCNC: 6 MMOL/L (ref 8–16)
BACTERIA UR CULT: ABNORMAL
BUN SERPL-MCNC: 19 MG/DL (ref 8–23)
CALCIUM SERPL-MCNC: 8.4 MG/DL (ref 8.7–10.5)
CHLORIDE SERPL-SCNC: 108 MMOL/L (ref 95–110)
CO2 SERPL-SCNC: 22 MMOL/L (ref 23–29)
CREAT SERPL-MCNC: 0.8 MG/DL (ref 0.5–1.4)
ERYTHROCYTE [DISTWIDTH] IN BLOOD BY AUTOMATED COUNT: 13.4 % (ref 11.5–14.5)
EST. GFR  (NO RACE VARIABLE): >60 ML/MIN/1.73 M^2
GLUCOSE SERPL-MCNC: 93 MG/DL (ref 70–110)
HCT VFR BLD AUTO: 39.4 % (ref 40–54)
HGB BLD-MCNC: 13.3 G/DL (ref 14–18)
MCH RBC QN AUTO: 29.6 PG (ref 27–31)
MCHC RBC AUTO-ENTMCNC: 33.8 G/DL (ref 32–36)
MCV RBC AUTO: 88 FL (ref 82–98)
PLATELET # BLD AUTO: 275 K/UL (ref 150–450)
PMV BLD AUTO: 10.2 FL (ref 9.2–12.9)
POTASSIUM SERPL-SCNC: 4 MMOL/L (ref 3.5–5.1)
RBC # BLD AUTO: 4.49 M/UL (ref 4.6–6.2)
SODIUM SERPL-SCNC: 136 MMOL/L (ref 136–145)
WBC # BLD AUTO: 13.05 K/UL (ref 3.9–12.7)

## 2023-08-28 PROCEDURE — 85027 COMPLETE CBC AUTOMATED: CPT | Performed by: INTERNAL MEDICINE

## 2023-08-28 PROCEDURE — 25000003 PHARM REV CODE 250: Performed by: INTERNAL MEDICINE

## 2023-08-28 PROCEDURE — 63600175 PHARM REV CODE 636 W HCPCS: Performed by: INTERNAL MEDICINE

## 2023-08-28 PROCEDURE — 96376 TX/PRO/DX INJ SAME DRUG ADON: CPT

## 2023-08-28 PROCEDURE — 80048 BASIC METABOLIC PNL TOTAL CA: CPT | Performed by: INTERNAL MEDICINE

## 2023-08-28 PROCEDURE — G0378 HOSPITAL OBSERVATION PER HR: HCPCS

## 2023-08-28 PROCEDURE — 36415 COLL VENOUS BLD VENIPUNCTURE: CPT | Performed by: INTERNAL MEDICINE

## 2023-08-28 RX ORDER — LOPERAMIDE HYDROCHLORIDE 2 MG/1
2 CAPSULE ORAL 4 TIMES DAILY PRN
Status: DISCONTINUED | OUTPATIENT
Start: 2023-08-28 | End: 2023-09-01 | Stop reason: HOSPADM

## 2023-08-28 RX ADMIN — PIPERACILLIN AND TAZOBACTAM 3.38 G: 3; .375 INJECTION, POWDER, LYOPHILIZED, FOR SOLUTION INTRAVENOUS; PARENTERAL at 10:08

## 2023-08-28 RX ADMIN — OXYCODONE AND ACETAMINOPHEN 1 TABLET: 325; 5 TABLET ORAL at 05:08

## 2023-08-28 RX ADMIN — AMLODIPINE BESYLATE 5 MG: 5 TABLET ORAL at 10:08

## 2023-08-28 RX ADMIN — METOPROLOL SUCCINATE 25 MG: 25 TABLET, FILM COATED, EXTENDED RELEASE ORAL at 10:08

## 2023-08-28 RX ADMIN — ATORVASTATIN CALCIUM 80 MG: 40 TABLET, FILM COATED ORAL at 07:08

## 2023-08-28 RX ADMIN — PIPERACILLIN AND TAZOBACTAM 3.38 G: 3; .375 INJECTION, POWDER, LYOPHILIZED, FOR SOLUTION INTRAVENOUS; PARENTERAL at 04:08

## 2023-08-28 RX ADMIN — ASPIRIN 81 MG: 81 TABLET, COATED ORAL at 10:08

## 2023-08-28 RX ADMIN — PIPERACILLIN AND TAZOBACTAM 3.38 G: 3; .375 INJECTION, POWDER, LYOPHILIZED, FOR SOLUTION INTRAVENOUS; PARENTERAL at 11:08

## 2023-08-28 RX ADMIN — OXYCODONE AND ACETAMINOPHEN 1 TABLET: 325; 5 TABLET ORAL at 07:08

## 2023-08-28 RX ADMIN — PIPERACILLIN AND TAZOBACTAM 3.38 G: 3; .375 INJECTION, POWDER, LYOPHILIZED, FOR SOLUTION INTRAVENOUS; PARENTERAL at 01:08

## 2023-08-28 RX ADMIN — OXYCODONE AND ACETAMINOPHEN 1 TABLET: 325; 5 TABLET ORAL at 12:08

## 2023-08-28 NOTE — CONSULTS
Interprofessional Telephone CONSULT Note  Date of Service: 08/28/2023  Patient's location: [facility]     Specialty Consulted: Urology  Staff Consulted: Clarissa Brady MD  Reason for Consult: urinary retention/uti     Time spent reviewing records, making plan and formulating plan: 35 min. More than half the time was devoted to medical consultative verbal/internet discussion with nursing and consulting physician. The purpose of this note was to treat and evaluate patient and not to arrange a transfer of care or other face to face service.   I also spoke directly with the patient and familyover the phone to obtain history and discuss their plan.    This service was not originating from a related E/M service provided within the previous 7 days nor will  to an E/M service or procedure within the next 24 hours or my soonest available appointment.      Both a written plan and a verbal/internet discussion were discussed with the following physician consultant: MD Laura  21613 5-10 min  65696 11 to 20 min  49275 21 to 30 min  81347 31 min          [facility] - [facility]  Staff: MD Jose Antonio  Group:Jose Antonio/Jaimee/Beny/Ralf/Larry  Referring provider and please cc:    PCP: [facility]  Date of Service: 08/28/2023        Subjective:      Initial consult by me in hospital on 8/28/23:  Arcadio eBgum is a 78 y.o. male pt prsented to er on 8/24 with complaints of frequency, urgency.  Patient is status post recent TURBT on 8/18 by  approximately a little over 1 week ago.  Patient noticed increasing frequency and urgency yesterday and this morning got worse.  At the worst symptoms are mild-to-moderate. Ua + for infection. Culture + for pseudomonas. ctrss showed distended bladder. AAA. Wall abnormality on left side of bladder. He was discharged on ceftin.         He came back to er again with diarrhea and dysuria.  Wbc 13. Cr  0.8. I asked them to check bladder scan. Came back >400. Had them place 16fr coude. Drained 400cc. Currently on zosyn.     Urine history:        Urine Culture, Routine   8/24/2023 PSEUDOMONAS AERUGINOSA !    8/27/2023 PSEUDOMONAS AERUGINOSA !       Legend:  ! Abnormal    PSA history:      Current REVIEW OF SYSTEMS:  Negative for the remaining 12 points of ROS except for as stated above       PMHx:  History reviewed. No pertinent past medical history.    PSHx:  History reviewed. No pertinent surgical history.    Fam Hx:  Reviewed- pertinent family hx as above    Soc Hx:  Social History     Substance Use Topics    Alcohol use: Not Currently       Allergies:  Patient has no known allergies.  Anticoagulation/Aspirin:     Objective:     Vitals:    08/28/23 1009   BP: (!) 136/91   Pulse: 76   Resp:    Temp:                Pertinent urologic PATHOLOGY AND RADIOLOGY REVIEW:  See hpi for recent      Assessment:     Arcadio Begum is a 78 y.o. male with   1. Altered mental status, unspecified altered mental status type    2. Urinary tract infection without hematuria, site unspecified    3. Chest pain        urology consulted for:  Uti s/p sp bladder tumor resection. Found to have elevated urine residual. Abnormal ct of bladder. + uti.      Plan:     Discharge with oliver and antibiotics  reStart flomax 0.4mg/tamsulosin nightly    F/u with  for voiding trial       Clarissa Brady MD

## 2023-08-28 NOTE — NURSING
0700: report received, in bed with no distress  1315: inserted Coude catheter, 400 cc urine return, inflated 30 cc bulb.  1800: patient  had oliver place, feels much better, very pleasant and cooperative.

## 2023-08-28 NOTE — PHARMACY MED REC
"              .      Admission Medication History     The home medication history was taken by Jenny Gunderson.    You may go to "Admission" then "Reconcile Home Medications" tabs to review and/or act upon these items.     The home medication list has been updated by the Pharmacy department.   Please read ALL comments highlighted in yellow.   Please address this information as you see fit.    Feel free to contact us if you have any questions or require assistance.            Medications listed below were obtained from: Patient/family  No current facility-administered medications on file prior to encounter.     Current Outpatient Medications on File Prior to Encounter   Medication Sig Dispense Refill    acetaminophen (TYLENOL) 500 MG tablet Take 500 mg by mouth every 6 (six) hours as needed for Pain.      amLODIPine (NORVASC) 10 MG tablet Take 5 mg by mouth once daily.      aspirin (ECOTRIN) 81 MG EC tablet Take 81 mg by mouth once daily.      atorvastatin (LIPITOR) 80 MG tablet Take 80 mg by mouth once daily.      cefUROXime (CEFTIN) 500 MG tablet Take 1 tablet (500 mg total) by mouth 2 (two) times daily. for 10 days 20 tablet 0    empagliflozin (JARDIANCE) 25 mg tablet Take 0.5 mg by mouth every other day.      ibuprofen (ADVIL,MOTRIN) 200 MG tablet Take 200 mg by mouth every 6 (six) hours as needed for Pain.      metoprolol succinate (TOPROL-XL) 25 MG 24 hr tablet Take 25 mg by mouth once daily.      oxyCODONE-acetaminophen (PERCOCET) 5-325 mg per tablet Take 1 tablet by mouth every 6 (six) hours as needed for Pain.      phenazopyridine (PYRIDIUM) 100 MG tablet Take 100 mg by mouth 3 (three) times daily as needed for Pain.      [DISCONTINUED] aspirin (ECOTRIN) 81 MG EC tablet Take 81 mg by mouth once daily.             Jenny Gunderson  [id number]        "

## 2023-08-28 NOTE — PLAN OF CARE
[facility]  Initial Discharge Assessment       Primary Care Provider: [facility]    Admission Diagnosis: Altered mental status, unspecified altered mental status type [R41.82]    Admission Date: 8/27/2023  Expected Discharge Date:      met with Pt at bedside to complete discharge assessment. Pt AAOx4s. Pt lives with spouse. Demographics, PCP, and insurance verified. No home health. No dialysis. Pt reports ability to complete ADLs without assistance, but Pt has just received a rolling walker during this hospital stay. Pt verbalized plan to discharge home via family transport. Pt has no other needs to be addressed at this time.    Transition of Care Barriers: None    Payor: [employer] / Plan: [facility] [employer] / Product Type: Government /     Extended Emergency Contact Information  Primary Emergency Contact: Cleo Begum  Mobile Phone: [phone]  Relation: Spouse  Preferred language: English   needed? No    Discharge Plan A: Home with family  Discharge Plan B: Home with family      [facility] - [place], [place] - [address] [facility]  [address] [address] [address]  Phone: [phone] Fax: [phone]      Initial Assessment (most recent)       Adult Discharge Assessment - 08/28/23 1300          Discharge Assessment    Assessment Type Discharge Planning Assessment     Confirmed/corrected address, phone number and insurance Yes     Confirmed Demographics Correct on Facesheet     Source of Information patient     Does patient/caregiver understand observation status Yes     Communicated KRISTAN with patient/caregiver Yes     Reason For Admission UTI (urinary tract infection)     People in Home spouse     Facility Arrived From: Home     Do you expect to return to your current living situation? Yes     Do you have help at home or someone to help you manage your care at home? Yes     Who are your caregiver(s) and their phone number(s)? Cleo Begum  (Spouse)   [phone] (Mobile)     Prior to hospitilization cognitive status: Alert/Oriented     Current cognitive status: Alert/Oriented     Home Accessibility wheelchair accessible     Home Layout Able to live on 1st floor     Equipment Currently Used at Home none     Readmission within 30 days? No     Patient currently being followed by outpatient case management? No     Do you currently have service(s) that help you manage your care at home? No     Do you take prescription medications? Yes     Do you have prescription coverage? Yes     Coverage Payor:  [facility] [employer]     Do you have any problems affording any of your prescribed medications? No     Is the patient taking medications as prescribed? yes     Who is going to help you get home at discharge? Cleo Begum (Spouse)   [phone] (Mobile)     How do you get to doctors appointments? family or friend will provide;car, drives self     Are you on dialysis? No     Do you take coumadin? No     DME Needed Upon Discharge  none     Discharge Plan discussed with: Patient     Transition of Care Barriers None     Discharge Plan A Home with family     Discharge Plan B Home with family        OTHER    Name(s) of People in Home Cleo Begum (Spouse)   [phone] (Mobile)

## 2023-08-28 NOTE — PHARMACY MED REC
"              .      Admission Medication History     The home medication history was taken by Jenny Gunderson.    You may go to "Admission" then "Reconcile Home Medications" tabs to review and/or act upon these items.     The home medication list has been updated by the Pharmacy department.   Please read ALL comments highlighted in yellow.   Please address this information as you see fit.    Feel free to contact us if you have any questions or require assistance.            Medications listed below were obtained from: Patient/family  No current facility-administered medications on file prior to encounter.     Current Outpatient Medications on File Prior to Encounter   Medication Sig Dispense Refill    acetaminophen (TYLENOL) 500 MG tablet Take 500 mg by mouth every 6 (six) hours as needed for Pain.      amLODIPine (NORVASC) 10 MG tablet Take 5 mg by mouth once daily.      atorvastatin (LIPITOR) 80 MG tablet Take 80 mg by mouth once daily.      cefUROXime (CEFTIN) 500 MG tablet Take 1 tablet (500 mg total) by mouth 2 (two) times daily. for 10 days 20 tablet 0    empagliflozin (JARDIANCE) 25 mg tablet Take 0.5 mg by mouth every other day.      ibuprofen (ADVIL,MOTRIN) 200 MG tablet Take 200 mg by mouth every 6 (six) hours as needed for Pain.      metoprolol succinate (TOPROL-XL) 25 MG 24 hr tablet Take 25 mg by mouth once daily.      oxyCODONE-acetaminophen (PERCOCET) 5-325 mg per tablet Take 1 tablet by mouth every 6 (six) hours as needed for Pain.      phenazopyridine (PYRIDIUM) 100 MG tablet Take 100 mg by mouth 3 (three) times daily as needed for Pain.      [DISCONTINUED] aspirin (ECOTRIN) 81 MG EC tablet Take 81 mg by mouth once daily.             Jenny Gunderson  [id number]        "

## 2023-08-29 LAB
ANION GAP SERPL CALC-SCNC: 7 MMOL/L (ref 8–16)
BACTERIA BLD CULT: NORMAL
BACTERIA BLD CULT: NORMAL
BUN SERPL-MCNC: 14 MG/DL (ref 8–23)
CALCIUM SERPL-MCNC: 8.7 MG/DL (ref 8.7–10.5)
CHLORIDE SERPL-SCNC: 106 MMOL/L (ref 95–110)
CO2 SERPL-SCNC: 26 MMOL/L (ref 23–29)
CREAT SERPL-MCNC: 0.8 MG/DL (ref 0.5–1.4)
EST. GFR  (NO RACE VARIABLE): >60 ML/MIN/1.73 M^2
GLUCOSE SERPL-MCNC: 127 MG/DL (ref 70–110)
POTASSIUM SERPL-SCNC: 3.6 MMOL/L (ref 3.5–5.1)
SODIUM SERPL-SCNC: 139 MMOL/L (ref 136–145)
STOOL CULTURE: NORMAL
STOOL CULTURE: NORMAL

## 2023-08-29 PROCEDURE — 80048 BASIC METABOLIC PNL TOTAL CA: CPT | Performed by: INTERNAL MEDICINE

## 2023-08-29 PROCEDURE — 36415 COLL VENOUS BLD VENIPUNCTURE: CPT | Performed by: INTERNAL MEDICINE

## 2023-08-29 PROCEDURE — 25000003 PHARM REV CODE 250: Performed by: INTERNAL MEDICINE

## 2023-08-29 PROCEDURE — 12000002 HC ACUTE/MED SURGE SEMI-PRIVATE ROOM

## 2023-08-29 PROCEDURE — 25000003 PHARM REV CODE 250: Performed by: HOSPITALIST

## 2023-08-29 PROCEDURE — C1751 CATH, INF, PER/CENT/MIDLINE: HCPCS

## 2023-08-29 PROCEDURE — 63600175 PHARM REV CODE 636 W HCPCS: Performed by: INTERNAL MEDICINE

## 2023-08-29 RX ORDER — DICYCLOMINE HYDROCHLORIDE 10 MG/1
10 CAPSULE ORAL 4 TIMES DAILY
Status: DISCONTINUED | OUTPATIENT
Start: 2023-08-29 | End: 2023-08-29

## 2023-08-29 RX ORDER — DICYCLOMINE HYDROCHLORIDE 10 MG/1
10 CAPSULE ORAL 4 TIMES DAILY
Status: DISCONTINUED | OUTPATIENT
Start: 2023-08-29 | End: 2023-09-01 | Stop reason: HOSPADM

## 2023-08-29 RX ORDER — MUPIROCIN 20 MG/G
OINTMENT TOPICAL 2 TIMES DAILY
Status: DISCONTINUED | OUTPATIENT
Start: 2023-08-29 | End: 2023-09-01 | Stop reason: HOSPADM

## 2023-08-29 RX ADMIN — ASPIRIN 81 MG: 81 TABLET, COATED ORAL at 08:08

## 2023-08-29 RX ADMIN — PIPERACILLIN AND TAZOBACTAM 3.38 G: 3; .375 INJECTION, POWDER, LYOPHILIZED, FOR SOLUTION INTRAVENOUS; PARENTERAL at 11:08

## 2023-08-29 RX ADMIN — MUPIROCIN 1 G: 20 OINTMENT TOPICAL at 08:08

## 2023-08-29 RX ADMIN — OXYCODONE AND ACETAMINOPHEN 1 TABLET: 325; 5 TABLET ORAL at 06:08

## 2023-08-29 RX ADMIN — METOPROLOL SUCCINATE 25 MG: 25 TABLET, FILM COATED, EXTENDED RELEASE ORAL at 08:08

## 2023-08-29 RX ADMIN — AMLODIPINE BESYLATE 5 MG: 5 TABLET ORAL at 08:08

## 2023-08-29 RX ADMIN — PIPERACILLIN AND TAZOBACTAM 3.38 G: 3; .375 INJECTION, POWDER, LYOPHILIZED, FOR SOLUTION INTRAVENOUS; PARENTERAL at 05:08

## 2023-08-29 RX ADMIN — DICYCLOMINE HYDROCHLORIDE 10 MG: 10 CAPSULE ORAL at 08:08

## 2023-08-29 RX ADMIN — ATORVASTATIN CALCIUM 80 MG: 40 TABLET, FILM COATED ORAL at 08:08

## 2023-08-29 RX ADMIN — OXYCODONE AND ACETAMINOPHEN 1 TABLET: 325; 5 TABLET ORAL at 05:08

## 2023-08-29 RX ADMIN — OXYCODONE AND ACETAMINOPHEN 1 TABLET: 325; 5 TABLET ORAL at 12:08

## 2023-08-29 NOTE — NURSING
PIV infiltrated to L FA, pt requesting to leave out for little while. Notified Per Dr. Kwan, OK to leave IV out. Talking with ID about antibiotics, possible d/c.

## 2023-08-29 NOTE — HOSPITAL COURSE
Patient admitted with AMS and lower abdominal pain s/p TURB. He was started on zosyn based on urine cultures from 8/24/23 - growing pseudomonas. Urology consulted due to significant findings on CT scan.   5. Enlarged prostate.   6. Wide necked outpouching of posterior bladder wall suggesting large bladder diverticulum.   7. Eccentric leftposterolateral bladder wall thickening and mucosal hyperenhancement could be of infectious or inflammatory etiology although bladder malignancy is a consideration. Cystoscopy would be needed to exclude bladder malignancy and is recommended. #8 small fat-containing right inguinal hernia and tiny fat-containing periumbilical hernias.     Cath placed and uop measured at 400ml. Urology recommending oliver stay in until seen by his urologist. D/w ID dc antibiotics for culture result.   Midline placement and plan for cefepime 2gm IV Q12 hr x 7 days-[facility]- [facility] on [facility]     CM dc assessment:  Home health arranged with [facility].  Per yamini Colmenares they [facility] and can [facility] patient tomorrow 9/1/23.     Home infusion arranged with [facility].  Per yamini White, they have received auth from the [facility] and teaching was done at bedside with patient and wife.

## 2023-08-29 NOTE — PROGRESS NOTES
[facility] Medicine  Progress Note    Patient Name: Arcadio Begum  MRN: [id number]  Patient Class: OP- Observation   Admission Date: 8/27/2023  Length of Stay: 0 days  Attending Physician: Jaz Kwan MD  Primary Care Provider: [facility]        Subjective:     Principal Problem:UTI (urinary tract infection)        HPI:  78 M with HTN , CAD  came to ER today with AMS and mild SPA pain .  [facility] and got Dx of stage 1 Bladder Ca and planning to do chemo next week as per wife . He was DC with U cath post procedure and it was out few days back .  But developed urine  frequency, urgency and fever as high as 102.2 at home with Mild AMS and came to ER today . In fact he was here in ER few days back and DC with Ceftin but never get better .  CT abdomen was done large bladder diverticulum. Eccentric left posterolateral bladder wall thickening and mucosal hyperenhancement could be of infectious or inflammatory and possible post operative findings .  PMH with HTN , had CP and LHC last year but stent was not placed because of difficult position of vessels.   past surgical with inguinal hernia repair   No family history           Overview/Hospital Course:  Patient admitted with AMS and lower abdominal pain s/p TURB. He was started on zosyn based on urine cultures from 8/24/23 - growing pseudomonas. Urology consulted due to significant findings on CT scan.   5. Enlarged prostate.   6. Wide necked outpouching of posterior bladder wall suggesting large bladder diverticulum.   7. Eccentric leftposterolateral bladder wall thickening and mucosal hyperenhancement could be of infectious or inflammatory etiology although bladder malignancy is a consideration. Cystoscopy would be needed to exclude bladder malignancy and is recommended. #8 small fat-containing right inguinal hernia and tiny fat-containing periumbilical hernias.     Cath placed and  uop measured at 400ml. Urology recommending oliver stay in until seen by his urologist. D/w ID dc antibiotics for culture result.       Interval History: patient seen and examined; upset that the catheter has to stay in    Review of Systems   Constitutional:  Positive for activity change. Negative for chills and fever.   Respiratory: Negative.     Gastrointestinal:  Positive for abdominal pain.   Genitourinary:  Positive for difficulty urinating.   Musculoskeletal: Negative.    Neurological: Negative.    Psychiatric/Behavioral:  The patient is nervous/anxious.      Objective:     Vital Signs (Most Recent):  Temp: 97.6 °F (36.4 °C) (08/28/23 2325)  Pulse: 60 (08/28/23 2325)  Resp: 18 (08/28/23 2325)  BP: 130/81 (08/28/23 2325)  SpO2: (!) 94 % (08/28/23 2325) Vital Signs (24h Range):  Temp:  [97.5 °F (36.4 °C)-98.2 °F (36.8 °C)] 97.6 °F (36.4 °C)  Pulse:  [60-77] 60  Resp:  [16-18] 18  SpO2:  [93 %-95 %] 94 %  BP: (130-154)/(81-91) 130/81     Weight: 99.8 kg (220 lb)  Body mass index is 35.51 kg/m².    Intake/Output Summary (Last 24 hours) at 8/29/2023 0029  Last data filed at 8/28/2023 2330  Gross per 24 hour   Intake 240 ml   Output 1801 ml   Net -1561 ml         Physical Exam  Constitutional:       General: He is not in acute distress.  Eyes:      Extraocular Movements: Extraocular movements intact.      Conjunctiva/sclera: Conjunctivae normal.   Cardiovascular:      Rate and Rhythm: Normal rate and regular rhythm.      Pulses: Normal pulses.   Pulmonary:      Effort: Pulmonary effort is normal. No respiratory distress.      Breath sounds: No wheezing.   Abdominal:      General: Bowel sounds are normal. There is distension.   Genitourinary:     Penis: Normal.       Comments: Oliver cath in place   Musculoskeletal:      Cervical back: Normal range of motion and neck supple.   Skin:     General: Skin is warm and dry.      Capillary Refill: Capillary refill takes less than 2 seconds.   Neurological:      General: No  focal deficit present.      Mental Status: He is alert and oriented to person, place, and time. Mental status is at baseline.   Psychiatric:         Mood and Affect: Mood normal.         Behavior: Behavior normal.             Significant Labs: All pertinent labs within the past 24 hours have been reviewed.  Urine Studies:   Recent Labs   Lab 08/27/23  1329   COLORU Yellow   APPEARANCEUA Clear   PHUR 6.0   SPECGRAV 1.030   PROTEINUA Trace*   GLUCUA 4+*   KETONESU 2+*   BILIRUBINUA Negative   OCCULTUA 2+*   NITRITE Negative   UROBILINOGEN Negative   LEUKOCYTESUR 1+*   RBCUA 3   WBCUA 44*   BACTERIA Occasional   SQUAMEPITHEL 0   HYALINECASTS 2*       Significant Imaging: I have reviewed all pertinent imaging results/findings within the past 24 hours.      Assessment/Plan:      * UTI (urinary tract infection)  Zosyn   Urine and blood culture   Recent TURBT   IVF  D/w ID choice in appropriate abx   Pseudomonas aeruginosa       CULTURE, URINE     Cefepime <=2 mcg/mL Sensitive     Ciprofloxacin 2 mcg/mL Intermediate     Gentamicin <=4 mcg/mL Sensitive     Levofloxacin 4 mcg/mL Intermediate     Meropenem <=1 mcg/mL Sensitive     Piperacillin/Tazo <=16 mcg/mL Sensitive     Tobramycin <=4 mcg/mL Sensitive                    Urinary retention  Oliver cath placed with 400ml   uop return   Keep oliver in until seen by his urologist     CAD (coronary artery disease)  Aware  Home meds      Hypertension  Resume home meds  Need reconciliation when ready       Abdominal aortic aneurysm 3 cm   Repeat US and monitor       Encephalopathy, toxic  Secondary to UTI   CT head neg  IVF   antibiotics       S/P TURP  Aware  Get document from [facility]  dr julianne cross urology       Bladder diverticulum  In CT scan today . Not sure post op clot   Close monitor urine output   IVF   Uro Cx      VTE Risk Mitigation (From admission, onward)         Ordered     IP VTE HIGH RISK PATIENT  Once         08/27/23 1526     Place sequential compression  device  Until discontinued         08/27/23 1526                Discharge Planning   KRISTAN: 8/31/2023     Code Status: Full Code   Is the patient medically ready for discharge?:     Reason for patient still in hospital (select all that apply): Laboratory test and Treatment  Discharge Plan A: Home with family                  Jaz Kwan MD  Department of Hospital Medicine   [facility]

## 2023-08-29 NOTE — SUBJECTIVE & OBJECTIVE
Interval History: patient seen and examined; upset that the catheter has to stay in    Review of Systems   Constitutional:  Positive for activity change. Negative for chills and fever.   Respiratory: Negative.     Gastrointestinal:  Positive for abdominal pain.   Genitourinary:  Positive for difficulty urinating.   Musculoskeletal: Negative.    Neurological: Negative.    Psychiatric/Behavioral:  The patient is nervous/anxious.      Objective:     Vital Signs (Most Recent):  Temp: 97.6 °F (36.4 °C) (08/28/23 2325)  Pulse: 60 (08/28/23 2325)  Resp: 18 (08/28/23 2325)  BP: 130/81 (08/28/23 2325)  SpO2: (!) 94 % (08/28/23 2325) Vital Signs (24h Range):  Temp:  [97.5 °F (36.4 °C)-98.2 °F (36.8 °C)] 97.6 °F (36.4 °C)  Pulse:  [60-77] 60  Resp:  [16-18] 18  SpO2:  [93 %-95 %] 94 %  BP: (130-154)/(81-91) 130/81     Weight: 99.8 kg (220 lb)  Body mass index is 35.51 kg/m².    Intake/Output Summary (Last 24 hours) at 8/29/2023 0029  Last data filed at 8/28/2023 2330  Gross per 24 hour   Intake 240 ml   Output 1801 ml   Net -1561 ml         Physical Exam  Constitutional:       General: He is not in acute distress.  Eyes:      Extraocular Movements: Extraocular movements intact.      Conjunctiva/sclera: Conjunctivae normal.   Cardiovascular:      Rate and Rhythm: Normal rate and regular rhythm.      Pulses: Normal pulses.   Pulmonary:      Effort: Pulmonary effort is normal. No respiratory distress.      Breath sounds: No wheezing.   Abdominal:      General: Bowel sounds are normal. There is distension.   Genitourinary:     Penis: Normal.       Comments: Allison cath in place   Musculoskeletal:      Cervical back: Normal range of motion and neck supple.   Skin:     General: Skin is warm and dry.      Capillary Refill: Capillary refill takes less than 2 seconds.   Neurological:      General: No focal deficit present.      Mental Status: He is alert and oriented to person, place, and time. Mental status is at baseline.    Psychiatric:         Mood and Affect: Mood normal.         Behavior: Behavior normal.             Significant Labs: All pertinent labs within the past 24 hours have been reviewed.  Urine Studies:   Recent Labs   Lab 08/27/23  1329   COLORU Yellow   APPEARANCEUA Clear   PHUR 6.0   SPECGRAV 1.030   PROTEINUA Trace*   GLUCUA 4+*   KETONESU 2+*   BILIRUBINUA Negative   OCCULTUA 2+*   NITRITE Negative   UROBILINOGEN Negative   LEUKOCYTESUR 1+*   RBCUA 3   WBCUA 44*   BACTERIA Occasional   SQUAMEPITHEL 0   HYALINECASTS 2*       Significant Imaging: I have reviewed all pertinent imaging results/findings within the past 24 hours.

## 2023-08-29 NOTE — ASSESSMENT & PLAN NOTE
Zosyn   Urine and blood culture   Recent TURBT   IVF  D/w ID choice in appropriate abx   Pseudomonas aeruginosa       CULTURE, URINE     Cefepime <=2 mcg/mL Sensitive     Ciprofloxacin 2 mcg/mL Intermediate     Gentamicin <=4 mcg/mL Sensitive     Levofloxacin 4 mcg/mL Intermediate     Meropenem <=1 mcg/mL Sensitive     Piperacillin/Tazo <=16 mcg/mL Sensitive     Tobramycin <=4 mcg/mL Sensitive

## 2023-08-30 LAB
ANION GAP SERPL CALC-SCNC: 6 MMOL/L (ref 8–16)
BUN SERPL-MCNC: 10 MG/DL (ref 8–23)
CALCIUM SERPL-MCNC: 8.6 MG/DL (ref 8.7–10.5)
CHLORIDE SERPL-SCNC: 108 MMOL/L (ref 95–110)
CO2 SERPL-SCNC: 24 MMOL/L (ref 23–29)
CREAT SERPL-MCNC: 0.7 MG/DL (ref 0.5–1.4)
EST. GFR  (NO RACE VARIABLE): >60 ML/MIN/1.73 M^2
GLUCOSE SERPL-MCNC: 135 MG/DL (ref 70–110)
POTASSIUM SERPL-SCNC: 3.3 MMOL/L (ref 3.5–5.1)
SODIUM SERPL-SCNC: 138 MMOL/L (ref 136–145)

## 2023-08-30 PROCEDURE — 12000002 HC ACUTE/MED SURGE SEMI-PRIVATE ROOM

## 2023-08-30 PROCEDURE — 63600175 PHARM REV CODE 636 W HCPCS: Performed by: HOSPITALIST

## 2023-08-30 PROCEDURE — 80048 BASIC METABOLIC PNL TOTAL CA: CPT | Performed by: INTERNAL MEDICINE

## 2023-08-30 PROCEDURE — 25000003 PHARM REV CODE 250: Performed by: HOSPITALIST

## 2023-08-30 PROCEDURE — 25000003 PHARM REV CODE 250: Performed by: INTERNAL MEDICINE

## 2023-08-30 PROCEDURE — 36415 COLL VENOUS BLD VENIPUNCTURE: CPT | Performed by: INTERNAL MEDICINE

## 2023-08-30 RX ORDER — POTASSIUM CHLORIDE 20 MEQ/1
40 TABLET, EXTENDED RELEASE ORAL ONCE
Status: COMPLETED | OUTPATIENT
Start: 2023-08-30 | End: 2023-08-30

## 2023-08-30 RX ADMIN — METOPROLOL SUCCINATE 25 MG: 25 TABLET, FILM COATED, EXTENDED RELEASE ORAL at 09:08

## 2023-08-30 RX ADMIN — ATORVASTATIN CALCIUM 80 MG: 40 TABLET, FILM COATED ORAL at 09:08

## 2023-08-30 RX ADMIN — AMLODIPINE BESYLATE 5 MG: 5 TABLET ORAL at 09:08

## 2023-08-30 RX ADMIN — CEFEPIME 2 G: 2 INJECTION, POWDER, FOR SOLUTION INTRAVENOUS at 09:08

## 2023-08-30 RX ADMIN — POTASSIUM CHLORIDE 40 MEQ: 1500 TABLET, EXTENDED RELEASE ORAL at 09:08

## 2023-08-30 RX ADMIN — OXYCODONE AND ACETAMINOPHEN 1 TABLET: 325; 5 TABLET ORAL at 12:08

## 2023-08-30 RX ADMIN — MUPIROCIN 1 G: 20 OINTMENT TOPICAL at 09:08

## 2023-08-30 RX ADMIN — Medication: at 09:08

## 2023-08-30 RX ADMIN — ASPIRIN 81 MG: 81 TABLET, COATED ORAL at 09:08

## 2023-08-30 RX ADMIN — DICYCLOMINE HYDROCHLORIDE 10 MG: 10 CAPSULE ORAL at 09:08

## 2023-08-30 RX ADMIN — OXYCODONE AND ACETAMINOPHEN 1 TABLET: 325; 5 TABLET ORAL at 04:08

## 2023-08-30 RX ADMIN — DICYCLOMINE HYDROCHLORIDE 10 MG: 10 CAPSULE ORAL at 05:08

## 2023-08-30 RX ADMIN — SODIUM CHLORIDE: 0.9 INJECTION, SOLUTION INTRAVENOUS at 06:08

## 2023-08-30 RX ADMIN — OXYCODONE AND ACETAMINOPHEN 1 TABLET: 325; 5 TABLET ORAL at 06:08

## 2023-08-30 RX ADMIN — DICYCLOMINE HYDROCHLORIDE 10 MG: 10 CAPSULE ORAL at 01:08

## 2023-08-30 NOTE — PROGRESS NOTES
[facility] Medicine  Progress Note    Patient Name: Arcadio Begum  MRN: [id number]  Patient Class: IP- Inpatient   Admission Date: 8/27/2023  Length of Stay: 1 days  Attending Physician: Jaz Kwan MD  Primary Care Provider: [facility]        Subjective:     Principal Problem:UTI (urinary tract infection)        HPI:  78 M with HTN , CAD  came to ER today with AMS and mild SPA pain .  He [facility] and got Dx of stage 1 Bladder Ca and planning to do chemo next week as per wife . He was DC with U cath post procedure and it was out few days back .  But developed urine  frequency, urgency and fever as high as 102.2 at home with Mild AMS and came to ER today . In fact he was here in ER few days back and DC with Ceftin but never get better .  CT abdomen was done large bladder diverticulum. Eccentric left posterolateral bladder wall thickening and mucosal hyperenhancement could be of infectious or inflammatory and possible post operative findings .  PMH with HTN , had CP and LHC last year but stent was not placed because of difficult position of vessels.   past surgical with inguinal hernia repair   No family history           Overview/Hospital Course:  Patient admitted with AMS and lower abdominal pain s/p TURB. He was started on zosyn based on urine cultures from 8/24/23 - growing pseudomonas. Urology consulted due to significant findings on CT scan.   5. Enlarged prostate.   6. Wide necked outpouching of posterior bladder wall suggesting large bladder diverticulum.   7. Eccentric leftposterolateral bladder wall thickening and mucosal hyperenhancement could be of infectious or inflammatory etiology although bladder malignancy is a consideration. Cystoscopy would be needed to exclude bladder malignancy and is recommended. #8 small fat-containing right inguinal hernia and tiny fat-containing periumbilical hernias.     Cath placed and uop  measured at 400ml. Urology recommending oliver stay in until seen by his urologist. D/w ID dc antibiotics for culture result.   Midline placement and plan for cefepime 2gm IV Q12 hr x 7 days      Interval History: patient seen and examined; upset that the catheter has to stay in; reviewed plan for home IV abx; [facility] benefits may [facility] up dc    Review of Systems   Constitutional:  Positive for activity change. Negative for chills and fever.   Respiratory: Negative.     Gastrointestinal:  Positive for abdominal pain.   Genitourinary:  Positive for difficulty urinating.   Musculoskeletal: Negative.    Neurological: Negative.    Psychiatric/Behavioral:  The patient is nervous/anxious.      Objective:     Vital Signs (Most Recent):  Temp: 97.8 °F (36.6 °C) (08/29/23 2325)  Pulse: (!) 56 (08/29/23 2325)  Resp: 18 (08/29/23 2325)  BP: 115/74 (08/29/23 2325)  SpO2: 95 % (08/29/23 2325) Vital Signs (24h Range):  Temp:  [97.5 °F (36.4 °C)-98.6 °F (37 °C)] 97.8 °F (36.6 °C)  Pulse:  [56-64] 56  Resp:  [18] 18  SpO2:  [92 %-95 %] 95 %  BP: (115-145)/(74-91) 115/74     Weight: 99.8 kg (220 lb)  Body mass index is 35.51 kg/m².    Intake/Output Summary (Last 24 hours) at 8/30/2023 0045  Last data filed at 8/29/2023 2001  Gross per 24 hour   Intake 480 ml   Output 2200 ml   Net -1720 ml           Physical Exam  Constitutional:       General: He is not in acute distress.  Eyes:      Extraocular Movements: Extraocular movements intact.      Conjunctiva/sclera: Conjunctivae normal.   Cardiovascular:      Rate and Rhythm: Normal rate and regular rhythm.      Pulses: Normal pulses.   Pulmonary:      Effort: Pulmonary effort is normal. No respiratory distress.      Breath sounds: No wheezing.   Abdominal:      General: Bowel sounds are normal. There is distension.   Genitourinary:     Penis: Normal.       Comments: Oliver cath in place   Musculoskeletal:      Cervical back: Normal range of motion and neck supple.   Skin:     General: Skin is  "warm and dry.      Capillary Refill: Capillary refill takes less than 2 seconds.   Neurological:      General: No focal deficit present.      Mental Status: He is alert and oriented to person, place, and time. Mental status is at baseline.   Psychiatric:         Mood and Affect: Mood normal.         Behavior: Behavior normal.             Significant Labs: All pertinent labs within the past 24 hours have been reviewed.  Urine Studies:   No results for input(s): "COLORU", "APPEARANCEUA", "PHUR", "SPECGRAV", "PROTEINUA", "GLUCUA", "KETONESU", "BILIRUBINUA", "OCCULTUA", "NITRITE", "UROBILINOGEN", "LEUKOCYTESUR", "RBCUA", "WBCUA", "BACTERIA", "SQUAMEPITHEL", "HYALINECASTS" in the last 48 hours.    Invalid input(s): "WRIGHTSUR"      Significant Imaging: I have reviewed all pertinent imaging results/findings within the past 24 hours.      Assessment/Plan:      * UTI (urinary tract infection)  Zosyn   Urine and blood culture   Recent TURBT   IVF  D/w ID choice in appropriate abx   Pseudomonas aeruginosa       CULTURE, URINE     Cefepime <=2 mcg/mL Sensitive     Ciprofloxacin 2 mcg/mL Intermediate     Gentamicin <=4 mcg/mL Sensitive     Levofloxacin 4 mcg/mL Intermediate     Meropenem <=1 mcg/mL Sensitive     Piperacillin/Tazo <=16 mcg/mL Sensitive     Tobramycin <=4 mcg/mL Sensitive                Plan for cefepime 2gm IV Q12 x 7 days     Urinary retention  Oliver cath placed with 400ml   uop return   Keep oliver in until seen by his urologist     CAD (coronary artery disease)  Aware  Home meds      Hypertension  Resume home meds  Need reconciliation when ready       Abdominal aortic aneurysm 3 cm   Repeat US and monitor       Encephalopathy, toxic  Secondary to UTI   CT head neg  IVF   antibiotics       S/P [facility]URP  [facility]       Bladder diverticulum  In CT scan today . Not sure post op clot   Close monitor urine output   IVF   Uro Cx      VTE Risk Mitigation (From " admission, onward)         Ordered     IP VTE HIGH RISK PATIENT  Once         08/27/23 1526     Place sequential compression device  Until discontinued         08/27/23 1526                Discharge Planning   KRISTAN: 9/1/2023     Code Status: Full Code   Is the patient medically ready for discharge?:     Reason for patient still in hospital (select all that apply): Consult recommendations and Pending disposition  Discharge Plan A: Home with family   Discharge Delays: None known at this time              Jaz Kwan MD  Department of Hospital Medicine   [facility]

## 2023-08-30 NOTE — ASSESSMENT & PLAN NOTE
Zosyn   Urine and blood culture   Recent TURBT   IVF  D/w ID choice in appropriate abx   Pseudomonas aeruginosa       CULTURE, URINE     Cefepime <=2 mcg/mL Sensitive     Ciprofloxacin 2 mcg/mL Intermediate     Gentamicin <=4 mcg/mL Sensitive     Levofloxacin 4 mcg/mL Intermediate     Meropenem <=1 mcg/mL Sensitive     Piperacillin/Tazo <=16 mcg/mL Sensitive     Tobramycin <=4 mcg/mL Sensitive                Plan for cefepime 2gm IV Q12 x 7 days

## 2023-08-30 NOTE — SUBJECTIVE & OBJECTIVE
Interval History: patient seen and examined; upset that the catheter has to stay in; reviewed plan for home IV abx; [facility] benefits may hold up dc    Review of Systems   Constitutional:  Positive for activity change. Negative for chills and fever.   Respiratory: Negative.     Gastrointestinal:  Positive for abdominal pain.   Genitourinary:  Positive for difficulty urinating.   Musculoskeletal: Negative.    Neurological: Negative.    Psychiatric/Behavioral:  The patient is nervous/anxious.      Objective:     Vital Signs (Most Recent):  Temp: 97.8 °F (36.6 °C) (08/29/23 2325)  Pulse: (!) 56 (08/29/23 2325)  Resp: 18 (08/29/23 2325)  BP: 115/74 (08/29/23 2325)  SpO2: 95 % (08/29/23 2325) Vital Signs (24h Range):  Temp:  [97.5 °F (36.4 °C)-98.6 °F (37 °C)] 97.8 °F (36.6 °C)  Pulse:  [56-64] 56  Resp:  [18] 18  SpO2:  [92 %-95 %] 95 %  BP: (115-145)/(74-91) 115/74     Weight: 99.8 kg (220 lb)  Body mass index is 35.51 kg/m².    Intake/Output Summary (Last 24 hours) at 8/30/2023 0045  Last data filed at 8/29/2023 2001  Gross per 24 hour   Intake 480 ml   Output 2200 ml   Net -1720 ml           Physical Exam  Constitutional:       General: He is not in acute distress.  Eyes:      Extraocular Movements: Extraocular movements intact.      Conjunctiva/sclera: Conjunctivae normal.   Cardiovascular:      Rate and Rhythm: Normal rate and regular rhythm.      Pulses: Normal pulses.   Pulmonary:      Effort: Pulmonary effort is normal. No respiratory distress.      Breath sounds: No wheezing.   Abdominal:      General: Bowel sounds are normal. There is distension.   Genitourinary:     Penis: Normal.       Comments: Allison cath in place   Musculoskeletal:      Cervical back: Normal range of motion and neck supple.   Skin:     General: Skin is warm and dry.      Capillary Refill: Capillary refill takes less than 2 seconds.   Neurological:      General: No focal deficit present.      Mental Status: He is alert and oriented to person,  "place, and time. Mental status is at baseline.   Psychiatric:         Mood and Affect: Mood normal.         Behavior: Behavior normal.             Significant Labs: All pertinent labs within the past 24 hours have been reviewed.  Urine Studies:   No results for input(s): "COLORU", "APPEARANCEUA", "PHUR", "SPECGRAV", "PROTEINUA", "GLUCUA", "KETONESU", "BILIRUBINUA", "OCCULTUA", "NITRITE", "UROBILINOGEN", "LEUKOCYTESUR", "RBCUA", "WBCUA", "BACTERIA", "SQUAMEPITHEL", "HYALINECASTS" in the last 48 hours.    Invalid input(s): "WRIGHTSUR"      Significant Imaging: I have reviewed all pertinent imaging results/findings within the past 24 hours.  "

## 2023-08-30 NOTE — PLAN OF CARE
VSS on RA. Pt up independently in room , regular diet, c/o chronic back pain with relief noted with PRN pain meds. Allison catheter intact, clear yellow urine.           Problem: Adult Inpatient Plan of Care  Goal: Plan of Care Review  Outcome: Ongoing, Progressing  Goal: Absence of Hospital-Acquired Illness or Injury  Outcome: Ongoing, Progressing  Goal: Readiness for Transition of Care  Outcome: Ongoing, Progressing

## 2023-08-30 NOTE — PLAN OF CARE
[facility] discharge worksheet and packet with clinicals for home health and home infusion faxed to the [facility] at [phone] via [employer] and also sent via secure email to [facility] contact Cindy.     Met with patient and spouse at bedside to discuss discharge plan of home health and home infusion and both verbalize agreement and understanding.        08/30/23 1647   Post-Acute Status   Post-Acute Authorization Home Health;IV Infusion   Home Health Status Referrals Sent   IV Infusion Status Referral(s) sent   Discharge Delays None known at this time   Discharge Plan   Discharge Plan A Home Health

## 2023-08-31 VITALS
BODY MASS INDEX: 35.36 KG/M2 | TEMPERATURE: 98 F | WEIGHT: 220 LBS | HEART RATE: 58 BPM | SYSTOLIC BLOOD PRESSURE: 144 MMHG | OXYGEN SATURATION: 95 % | DIASTOLIC BLOOD PRESSURE: 87 MMHG | HEIGHT: 66 IN | RESPIRATION RATE: 18 BRPM

## 2023-08-31 PROBLEM — G92.9 ENCEPHALOPATHY, TOXIC: Status: RESOLVED | Noted: 2023-08-27 | Resolved: 2023-08-31

## 2023-08-31 LAB
ANION GAP SERPL CALC-SCNC: 7 MMOL/L (ref 8–16)
BUN SERPL-MCNC: 7 MG/DL (ref 8–23)
CALCIUM SERPL-MCNC: 8.7 MG/DL (ref 8.7–10.5)
CHLORIDE SERPL-SCNC: 109 MMOL/L (ref 95–110)
CO2 SERPL-SCNC: 25 MMOL/L (ref 23–29)
CREAT SERPL-MCNC: 0.7 MG/DL (ref 0.5–1.4)
EST. GFR  (NO RACE VARIABLE): >60 ML/MIN/1.73 M^2
GLUCOSE SERPL-MCNC: 135 MG/DL (ref 70–110)
POTASSIUM SERPL-SCNC: 3.5 MMOL/L (ref 3.5–5.1)
SODIUM SERPL-SCNC: 141 MMOL/L (ref 136–145)

## 2023-08-31 PROCEDURE — 36415 COLL VENOUS BLD VENIPUNCTURE: CPT | Performed by: INTERNAL MEDICINE

## 2023-08-31 PROCEDURE — 25000003 PHARM REV CODE 250: Performed by: INTERNAL MEDICINE

## 2023-08-31 PROCEDURE — 25000003 PHARM REV CODE 250: Performed by: HOSPITALIST

## 2023-08-31 PROCEDURE — 51798 US URINE CAPACITY MEASURE: CPT

## 2023-08-31 PROCEDURE — 80048 BASIC METABOLIC PNL TOTAL CA: CPT | Performed by: INTERNAL MEDICINE

## 2023-08-31 PROCEDURE — 63600175 PHARM REV CODE 636 W HCPCS: Performed by: HOSPITALIST

## 2023-08-31 RX ORDER — OXYCODONE AND ACETAMINOPHEN 5; 325 MG/1; MG/1
1 TABLET ORAL EVERY 4 HOURS PRN
Status: DISCONTINUED | OUTPATIENT
Start: 2023-08-31 | End: 2023-09-01 | Stop reason: HOSPADM

## 2023-08-31 RX ADMIN — METOPROLOL SUCCINATE 25 MG: 25 TABLET, FILM COATED, EXTENDED RELEASE ORAL at 08:08

## 2023-08-31 RX ADMIN — CEFEPIME 2 G: 2 INJECTION, POWDER, FOR SOLUTION INTRAVENOUS at 06:08

## 2023-08-31 RX ADMIN — AMLODIPINE BESYLATE 5 MG: 5 TABLET ORAL at 08:08

## 2023-08-31 RX ADMIN — ASPIRIN 81 MG: 81 TABLET, COATED ORAL at 08:08

## 2023-08-31 RX ADMIN — OXYCODONE AND ACETAMINOPHEN 1 TABLET: 325; 5 TABLET ORAL at 07:08

## 2023-08-31 RX ADMIN — DICYCLOMINE HYDROCHLORIDE 10 MG: 10 CAPSULE ORAL at 08:08

## 2023-08-31 RX ADMIN — OXYCODONE AND ACETAMINOPHEN 1 TABLET: 325; 5 TABLET ORAL at 03:08

## 2023-08-31 RX ADMIN — OXYCODONE AND ACETAMINOPHEN 1 TABLET: 325; 5 TABLET ORAL at 11:08

## 2023-08-31 RX ADMIN — DICYCLOMINE HYDROCHLORIDE 10 MG: 10 CAPSULE ORAL at 05:08

## 2023-08-31 RX ADMIN — CEFEPIME 2 G: 2 INJECTION, POWDER, FOR SOLUTION INTRAVENOUS at 10:08

## 2023-08-31 RX ADMIN — SODIUM CHLORIDE: 0.9 INJECTION, SOLUTION INTRAVENOUS at 05:08

## 2023-08-31 RX ADMIN — OXYCODONE AND ACETAMINOPHEN 1 TABLET: 325; 5 TABLET ORAL at 06:08

## 2023-08-31 RX ADMIN — DICYCLOMINE HYDROCHLORIDE 10 MG: 10 CAPSULE ORAL at 12:08

## 2023-08-31 NOTE — PLAN OF CARE
All discharge instruction given and reviewed with cg/pt and questions answered. Patient to d/c with midline home for home IV antibiotics.   Problem: Adult Inpatient Plan of Care  Goal: Plan of Care Review  Outcome: Met  Goal: Patient-Specific Goal (Individualized)  Outcome: Met  Goal: Absence of Hospital-Acquired Illness or Injury  Outcome: Met  Goal: Optimal Comfort and Wellbeing  Outcome: Met  Goal: Readiness for Transition of Care  Outcome: Met     Problem: Infection  Goal: Absence of Infection Signs and Symptoms  Outcome: Met

## 2023-08-31 NOTE — SUBJECTIVE & OBJECTIVE
Interval History: patient seen and examined; upset that the catheter has to stay in; reviewed plan for home IV abx; [facility] benefits may hold up dc    Review of Systems   Constitutional:  Positive for activity change. Negative for chills and fever.   Respiratory: Negative.     Gastrointestinal:  Positive for abdominal pain.   Genitourinary:  Positive for difficulty urinating.   Musculoskeletal: Negative.    Neurological: Negative.    Psychiatric/Behavioral:  The patient is nervous/anxious.      Objective:     Vital Signs (Most Recent):  Temp: 97.6 °F (36.4 °C) (08/30/23 2003)  Pulse: (!) 59 (08/30/23 2003)  Resp: 18 (08/30/23 2003)  BP: 132/85 (08/30/23 2003)  SpO2: 97 % (08/30/23 2003) Vital Signs (24h Range):  Temp:  [97.5 °F (36.4 °C)-98 °F (36.7 °C)] 97.6 °F (36.4 °C)  Pulse:  [57-66] 59  Resp:  [16-18] 18  SpO2:  [95 %-97 %] 97 %  BP: (119-132)/(76-85) 132/85     Weight: 99.8 kg (220 lb)  Body mass index is 35.51 kg/m².    Intake/Output Summary (Last 24 hours) at 8/30/2023 2331  Last data filed at 8/30/2023 1827  Gross per 24 hour   Intake 1528.1 ml   Output 2725 ml   Net -1196.9 ml           Physical Exam  Constitutional:       General: He is not in acute distress.  Eyes:      Extraocular Movements: Extraocular movements intact.      Conjunctiva/sclera: Conjunctivae normal.   Cardiovascular:      Rate and Rhythm: Normal rate and regular rhythm.      Pulses: Normal pulses.   Pulmonary:      Effort: Pulmonary effort is normal. No respiratory distress.      Breath sounds: No wheezing.   Abdominal:      General: Bowel sounds are normal. There is distension.   Genitourinary:     Penis: Normal.       Comments: Allison cath in place   Musculoskeletal:      Cervical back: Normal range of motion and neck supple.   Skin:     General: Skin is warm and dry.      Capillary Refill: Capillary refill takes less than 2 seconds.   Neurological:      General: No focal deficit present.      Mental Status: He is alert and oriented to  "person, place, and time. Mental status is at baseline.   Psychiatric:         Mood and Affect: Mood normal.         Behavior: Behavior normal.             Significant Labs: All pertinent labs within the past 24 hours have been reviewed.  Urine Studies:   No results for input(s): "COLORU", "APPEARANCEUA", "PHUR", "SPECGRAV", "PROTEINUA", "GLUCUA", "KETONESU", "BILIRUBINUA", "OCCULTUA", "NITRITE", "UROBILINOGEN", "LEUKOCYTESUR", "RBCUA", "WBCUA", "BACTERIA", "SQUAMEPITHEL", "HYALINECASTS" in the last 48 hours.    Invalid input(s): "WRIGHTSUR"      Significant Imaging: I have reviewed all pertinent imaging results/findings within the past 24 hours.  "

## 2023-08-31 NOTE — PLAN OF CARE
Home health arranged with [facility].  Per liadiane Colmenares they [facility] and can see patient tomorrow 9/1/23.     Home infusion arranged with [facility].  Per yamini White, they have received auth [facility] and teaching was done at bedside with patient and wife.      Updated wife Cleo who verbalizes agreement and understanding.  Spoke with daughter-in-law Adrienne at request of Cleo.  Requested oliver removed before discharge so patient can be monitored for complications.  Dr. wKan notified.        08/31/23 3115   Post-Acute Status   Post-Acute Authorization Home Health;IV Infusion   Home Health Status Set-up Complete/Auth obtained   [facility] Infusion Status Set-up Complete/Auth obtained   Discharge Delays None known at this time

## 2023-08-31 NOTE — PROGRESS NOTES
[facility] Medicine  Progress Note    Patient Name: Arcadio Begum  MRN: [id number]  Patient Class: IP- Inpatient   Admission Date: 8/27/2023  Length of Stay: 1 days  Attending Physician: Jaz Kwan MD  Primary Care Provider: [facility]        Subjective:     Principal Problem:UTI (urinary tract infection)        HPI:  78 M with HTN , CAD  came to ER today with AMS and mild SPA pain .  [facility] and got Dx of stage 1 Bladder Ca and planning to do chemo next week as per wife . He was DC with U cath post procedure and it was out few days back .  But developed urine  frequency, urgency and fever as high as 102.2 at home with Mild AMS and came to ER today . In fact he was here in ER few days back and DC with Ceftin but never get better .  CT abdomen was done large bladder diverticulum. Eccentric left posterolateral bladder wall thickening and mucosal hyperenhancement could be of infectious or inflammatory and possible post operative findings .  PMH with HTN , had CP and LHC last year but stent was not placed because of difficult position of vessels.   past surgical with inguinal hernia repair   No family history           Overview/Hospital Course:  Patient admitted with AMS and lower abdominal pain s/p TURB. He was started on zosyn based on urine cultures from 8/24/23 - growing pseudomonas. Urology consulted due to significant findings on CT scan.   5. Enlarged prostate.   6. Wide necked outpouching of posterior bladder wall suggesting large bladder diverticulum.   7. Eccentric leftposterolateral bladder wall thickening and mucosal hyperenhancement could be of infectious or inflammatory etiology although bladder malignancy is a consideration. Cystoscopy would be needed to exclude bladder malignancy and is recommended. #8 small fat-containing right inguinal hernia and tiny fat-containing periumbilical hernias.     Cath placed and uop  measured at 400ml. Urology recommending oliver stay in until seen by his urologist. D/w ID dc antibiotics for culture result.   Midline placement and plan for cefepime 2gm IV Q12 hr x 7 days-[facility] to [facility]- waiting on approval       Interval History: patient seen and examined; upset that the catheter has to stay in; reviewed plan for home IV abx; [facility] benefits may hold up dc    Review of Systems   Constitutional:  Positive for activity change. Negative for chills and fever.   Respiratory: Negative.     Gastrointestinal:  Positive for abdominal pain.   Genitourinary:  Positive for difficulty urinating.   Musculoskeletal: Negative.    Neurological: Negative.    Psychiatric/Behavioral:  The patient is nervous/anxious.      Objective:     Vital Signs (Most Recent):  Temp: 97.6 °F (36.4 °C) (08/30/23 2003)  Pulse: (!) 59 (08/30/23 2003)  Resp: 18 (08/30/23 2003)  BP: 132/85 (08/30/23 2003)  SpO2: 97 % (08/30/23 2003) Vital Signs (24h Range):  Temp:  [97.5 °F (36.4 °C)-98 °F (36.7 °C)] 97.6 °F (36.4 °C)  Pulse:  [57-66] 59  Resp:  [16-18] 18  SpO2:  [95 %-97 %] 97 %  BP: (119-132)/(76-85) 132/85     Weight: 99.8 kg (220 lb)  Body mass index is 35.51 kg/m².    Intake/Output Summary (Last 24 hours) at 8/30/2023 2331  Last data filed at 8/30/2023 1827  Gross per 24 hour   Intake 1528.1 ml   Output 2725 ml   Net -1196.9 ml           Physical Exam  Constitutional:       General: He is not in acute distress.  Eyes:      Extraocular Movements: Extraocular movements intact.      Conjunctiva/sclera: Conjunctivae normal.   Cardiovascular:      Rate and Rhythm: Normal rate and regular rhythm.      Pulses: Normal pulses.   Pulmonary:      Effort: Pulmonary effort is normal. No respiratory distress.      Breath sounds: No wheezing.   Abdominal:      General: Bowel sounds are normal. There is distension.   Genitourinary:     Penis: Normal.       Comments: Oliver cath in place   Musculoskeletal:      Cervical back: Normal range of  "motion and neck supple.   Skin:     General: Skin is warm and dry.      Capillary Refill: Capillary refill takes less than 2 seconds.   Neurological:      General: No focal deficit present.      Mental Status: He is alert and oriented to person, place, and time. Mental status is at baseline.   Psychiatric:         Mood and Affect: Mood normal.         Behavior: Behavior normal.             Significant Labs: All pertinent labs within the past 24 hours have been reviewed.  Urine Studies:   No results for input(s): "COLORU", "APPEARANCEUA", "PHUR", "SPECGRAV", "PROTEINUA", "GLUCUA", "KETONESU", "BILIRUBINUA", "OCCULTUA", "NITRITE", "UROBILINOGEN", "LEUKOCYTESUR", "RBCUA", "WBCUA", "BACTERIA", "SQUAMEPITHEL", "HYALINECASTS" in the last 48 hours.    Invalid input(s): "WRIGHTSUR"      Significant Imaging: I have reviewed all pertinent imaging results/findings within the past 24 hours.      Assessment/Plan:      * UTI (urinary tract infection)  Zosyn   Urine and blood culture   Recent TURBT   IVF  D/w ID choice in appropriate abx   Pseudomonas aeruginosa       CULTURE, URINE     Cefepime <=2 mcg/mL Sensitive     Ciprofloxacin 2 mcg/mL Intermediate     Gentamicin <=4 mcg/mL Sensitive     Levofloxacin 4 mcg/mL Intermediate     Meropenem <=1 mcg/mL Sensitive     Piperacillin/Tazo <=16 mcg/mL Sensitive     Tobramycin <=4 mcg/mL Sensitive                Plan for cefepime 2gm IV Q12 x 7 days     Urinary retention  Oliver cath placed with 400ml   uop return   Keep oliver in until seen by his urologist     CAD (coronary artery disease)  Aware  Home meds      Hypertension  Resume home meds  Need reconciliation when ready       Abdominal aortic aneurysm 3 cm   Repeat US and monitor       Encephalopathy, toxic  Secondary to UTI   CT head neg  IVF   antibiotics       S/P TURP  Aware  Get document from [facility]  dr julianne cross urology       Bladder diverticulum  In CT scan today . Not sure post op clot   Close monitor urine " output   IVF   Uro Cx      VTE Risk Mitigation (From admission, onward)         Ordered     IP VTE HIGH RISK PATIENT  Once         08/27/23 1526     Place sequential compression device  Until discontinued         08/27/23 1526                Discharge Planning   KRISTAN: 9/1/2023     Code Status: Full Code   Is the patient medically ready for discharge?:     Reason for patient still in hospital (select all that apply): Pending disposition  Discharge Plan A: Home Health   Discharge Delays: None known at this time              Jaz Kwan MD  Department of Hospital Medicine   [facility]

## 2023-08-31 NOTE — NURSING
Patient voided unmeasured amount, voided in toliet. Post void oliver removal residual 258 ml. Notified Dr Kwan

## 2023-08-31 NOTE — PLAN OF CARE
Home health arranged with [facility].  Per liadiane Colmenares they have [facility] and can see patient tomorrow 9/1/23.     Home infusion arranged with [facility].  Per yamini White, they have received auth from the [facility] and teaching was done at bedside with patient and wife.    Discharge orders and chart reviewed with no further post-acute discharge needs identified at this time.  At this time, patient is cleared for discharge from Case Management standpoint.        08/31/23 1649   Final Note   Assessment Type Final Discharge Note   Anticipated Discharge Disposition Home-Health   Post-Acute Status   Post-Acute Authorization Home Health;[facility]   Home Health Status Set-up Complete/Auth obtained   [facility] Status Set-up Complete/Auth obtained   Discharge Delays None known at this time

## 2023-08-31 NOTE — PROGRESS NOTES
Pt c/o pain meds not being as effective as they were, notified Dr. Kwan, new orders recieved to increase frequency from Q6 PRN to Q4 PRN

## 2023-09-01 ENCOUNTER — PATIENT OUTREACH (OUTPATIENT)
Dept: ADMINISTRATIVE | Facility: CLINIC | Age: 78
End: 2023-09-01

## 2023-09-01 DIAGNOSIS — N39.0 URINARY TRACT INFECTION WITHOUT HEMATURIA, SITE UNSPECIFIED: Primary | ICD-10-CM

## 2023-09-01 LAB
BACTERIA BLD CULT: NORMAL
BACTERIA BLD CULT: NORMAL
O+P SPEC MICRO: NORMAL
O+P STL CONC: NORMAL

## 2023-09-01 NOTE — PROGRESS NOTES
C3 nurse spoke with Arcadio Begum wife Cleo for a TCC post hospital discharge follow up call. The patient reports does not have a scheduled HOSFU appointment. C3 nurse was unable to schedule HOSFU appointment [facility]. Patient advised to contact their PCP to schedule a HOSPFU within 5-7 days.

## 2023-09-01 NOTE — PROGRESS NOTES
C3 nurse attempted to contact Arcadio Begum  for a TCC post hospital discharge follow up call. No answer. The patient does not have a scheduled HOSFU appointment, and the pt does not have an [facility] PCP.

## 2023-09-05 ENCOUNTER — LAB VISIT (OUTPATIENT)
Dept: LAB | Facility: HOSPITAL | Age: 78
End: 2023-09-05
Attending: INTERNAL MEDICINE
Payer: OTHER GOVERNMENT

## 2023-09-05 ENCOUNTER — PES CALL (OUTPATIENT)
Dept: HOME HEALTH SERVICES | Facility: CLINIC | Age: 78
End: 2023-09-05

## 2023-09-05 DIAGNOSIS — N39.0 UTI (URINARY TRACT INFECTION): ICD-10-CM

## 2023-09-05 DIAGNOSIS — N39.0 URINARY TRACT INFECTION, SITE NOT SPECIFIED: Primary | ICD-10-CM

## 2023-09-05 LAB
ALBUMIN SERPL BCP-MCNC: 3.6 G/DL (ref 3.5–5.2)
ALP SERPL-CCNC: 96 U/L (ref 55–135)
ALT SERPL W/O P-5'-P-CCNC: 40 U/L (ref 10–44)
ANION GAP SERPL CALC-SCNC: 12 MMOL/L (ref 8–16)
AST SERPL-CCNC: 24 U/L (ref 10–40)
BILIRUB SERPL-MCNC: 0.3 MG/DL (ref 0.1–1)
BUN SERPL-MCNC: 14 MG/DL (ref 8–23)
CALCIUM SERPL-MCNC: 9.6 MG/DL (ref 8.7–10.5)
CHLORIDE SERPL-SCNC: 103 MMOL/L (ref 95–110)
CO2 SERPL-SCNC: 22 MMOL/L (ref 23–29)
CREAT SERPL-MCNC: 0.8 MG/DL (ref 0.5–1.4)
CRP SERPL-MCNC: 6.6 MG/L (ref 0–8.2)
EST. GFR  (NO RACE VARIABLE): >60 ML/MIN/1.73 M^2
GLUCOSE SERPL-MCNC: 97 MG/DL (ref 70–110)
POTASSIUM SERPL-SCNC: 4.7 MMOL/L (ref 3.5–5.1)
PROT SERPL-MCNC: 7.9 G/DL (ref 6–8.4)
SODIUM SERPL-SCNC: 137 MMOL/L (ref 136–145)

## 2023-09-05 PROCEDURE — 36415 COLL VENOUS BLD VENIPUNCTURE: CPT | Performed by: INTERNAL MEDICINE

## 2023-09-05 PROCEDURE — 80053 COMPREHEN METABOLIC PANEL: CPT | Performed by: INTERNAL MEDICINE

## 2023-09-05 PROCEDURE — 86140 C-REACTIVE PROTEIN: CPT | Performed by: INTERNAL MEDICINE

## 2023-09-06 ENCOUNTER — PES CALL (OUTPATIENT)
Dept: HOME HEALTH SERVICES | Facility: CLINIC | Age: 78
End: 2023-09-06

## 2023-09-07 ENCOUNTER — PES CALL (OUTPATIENT)
Dept: HOME HEALTH SERVICES | Facility: CLINIC | Age: 78
End: 2023-09-07

## 2023-09-07 ENCOUNTER — LAB VISIT (OUTPATIENT)
Dept: LAB | Facility: HOSPITAL | Age: 78
End: 2023-09-07
Attending: INTERNAL MEDICINE
Payer: OTHER GOVERNMENT

## 2023-09-07 DIAGNOSIS — N39.0 URINARY TRACT INFECTION, SITE NOT SPECIFIED: Primary | ICD-10-CM

## 2023-09-07 LAB
BASOPHILS # BLD AUTO: 0.15 K/UL (ref 0–0.2)
BASOPHILS NFR BLD: 1.6 % (ref 0–1.9)
DIFFERENTIAL METHOD: ABNORMAL
EOSINOPHIL # BLD AUTO: 0.2 K/UL (ref 0–0.5)
EOSINOPHIL NFR BLD: 2.3 % (ref 0–8)
ERYTHROCYTE [DISTWIDTH] IN BLOOD BY AUTOMATED COUNT: 13.3 % (ref 11.5–14.5)
ERYTHROCYTE [SEDIMENTATION RATE] IN BLOOD BY WESTERGREN METHOD: 20 MM/HR (ref 0–10)
HCT VFR BLD AUTO: 46.1 % (ref 40–54)
HGB BLD-MCNC: 14.9 G/DL (ref 14–18)
IMM GRANULOCYTES # BLD AUTO: 0.05 K/UL (ref 0–0.04)
IMM GRANULOCYTES NFR BLD AUTO: 0.5 % (ref 0–0.5)
LYMPHOCYTES # BLD AUTO: 2.1 K/UL (ref 1–4.8)
LYMPHOCYTES NFR BLD: 22.5 % (ref 18–48)
MCH RBC QN AUTO: 28.4 PG (ref 27–31)
MCHC RBC AUTO-ENTMCNC: 32.3 G/DL (ref 32–36)
MCV RBC AUTO: 88 FL (ref 82–98)
MONOCYTES # BLD AUTO: 1.3 K/UL (ref 0.3–1)
MONOCYTES NFR BLD: 13.9 % (ref 4–15)
NEUTROPHILS # BLD AUTO: 5.5 K/UL (ref 1.8–7.7)
NEUTROPHILS NFR BLD: 59.2 % (ref 38–73)
NRBC BLD-RTO: 0 /100 WBC
PLATELET # BLD AUTO: 488 K/UL (ref 150–450)
PMV BLD AUTO: 10.7 FL (ref 9.2–12.9)
RBC # BLD AUTO: 5.25 M/UL (ref 4.6–6.2)
WBC # BLD AUTO: 9.37 K/UL (ref 3.9–12.7)

## 2023-09-07 PROCEDURE — 36415 COLL VENOUS BLD VENIPUNCTURE: CPT | Performed by: INTERNAL MEDICINE

## 2023-09-07 PROCEDURE — 85025 COMPLETE CBC W/AUTO DIFF WBC: CPT | Performed by: INTERNAL MEDICINE

## 2023-09-07 PROCEDURE — 85651 RBC SED RATE NONAUTOMATED: CPT | Performed by: INTERNAL MEDICINE

## 2023-10-16 NOTE — DISCHARGE SUMMARY
[facility] Medicine  Discharge Summary      Patient Name: Arcadio Begum  MRN: [id number]  JAYCOB: [id number]  Patient Class: IP- Inpatient  Admission Date: 8/27/2023  Hospital Length of Stay: 2 days  Discharge Date and Time: 08/31/2023  Attending Physician: No att. providers found   Discharging Provider: Jaz Kwan MD  Primary Care Provider: [facility]    Primary Care Team: Networked reference to record PCT     HPI:   78 M with HTN , CAD  came to ER today with AMS and mild SPA pain .  He [facility] and got Dx of stage 1 Bladder Ca and planning to do chemo next week as per wife . He was DC with U cath post procedure and it was out few days back .  But developed urine  frequency, urgency and fever as high as 102.2 at home with Mild AMS and came to ER today . In fact he was here in ER few days back and DC with Ceftin but never get better .  CT abdomen was done large bladder diverticulum. Eccentric left posterolateral bladder wall thickening and mucosal hyperenhancement could be of infectious or inflammatory and possible post operative findings .  PMH with HTN , had CP and LHC last year but stent was not placed because of difficult position of vessels.   past surgical with inguinal hernia repair   No family history           * No surgery found *      Hospital Course:   Patient admitted with AMS and lower abdominal pain s/p TURB. He was started on zosyn based on urine cultures from 8/24/23 - growing pseudomonas. Urology consulted due to significant findings on CT scan.   5. Enlarged prostate.   6. Wide necked outpouching of posterior bladder wall suggesting large bladder diverticulum.   7. Eccentric leftposterolateral bladder wall thickening and mucosal hyperenhancement could be of infectious or inflammatory etiology although bladder malignancy is a consideration. Cystoscopy would be needed to exclude bladder malignancy and is recommended.  #8 small fat-containing right inguinal hernia and tiny fat-containing periumbilical hernias.     Cath placed and uop measured at 400ml. Urology recommending oliver stay in until seen by his urologist. D/w ID dc antibiotics for culture result.   Midline placement and plan for cefepime 2gm IV Q12 hr x 7 days-[facility] to [facility]- waiting on approval     CM dc assessment:  Home health arranged with [facility].  Per liadiane Colmenares they have received [facility] the [facility] and can see patient tomorrow 9/1/23.     Home infusion arranged with [facility].  Per yamini White, they have received auth from the [facility] and teaching was done at bedside with patient and wife.       Goals of Care Treatment Preferences:  Code Status: Full Code      Consults:   Consults (From admission, onward)        Status Ordering Provider     Inpatient consult to Midline team  Once        Provider:  (Not yet assigned)    Completed GIANFRANCO DIAL     Inpatient consult to Urology  Once        Provider:  Clarissa Brady MD    Completed TRENT MASON          No new Assessment & Plan notes have been filed under this hospital service since the last note was generated.  Service: Hospital Medicine    Final Active Diagnoses:    Diagnosis Date Noted POA    PRINCIPAL PROBLEM:  UTI (urinary tract infection) [N39.0] 08/27/2023 Yes    Urinary retention [R33.9] 08/28/2023 Yes    Bladder diverticulum [N32.3] 08/27/2023 Yes    Abdominal aortic aneurysm 3 cm  [I71.40] 08/27/2023 Yes    Hypertension [I10] 08/27/2023 Yes    CAD (coronary artery disease) [I25.10] 08/27/2023 Yes      Problems Resolved During this Admission:    Diagnosis Date Noted Date Resolved POA    Encephalopathy, toxic [G92.9] 08/27/2023 08/31/2023 Yes       Discharged Condition: good    Disposition: Home-Health Care Svc    Follow Up:   Follow-up Information     Clinic, [facility].    Specialty: Internal Medicine  Contact information:  [id number]  [place]  [address]  [facility] [address]  [phone]                       Patient Instructions:      Ambulatory referral/consult to Home Health   Standing Status: Future   Referral Priority: Routine Referral Type: Home Health   Referral Reason: Specialty Services Required   Requested Specialty: Home Health Services   Number of Visits Requested: 1     Ambulatory referral/consult to Infectious Disease   Standing Status: Future   Referral Priority: Routine Referral Type: Consultation   Referral Reason: Specialty Services Required   Requested Specialty: Infectious Diseases   Number of Visits Requested: 1       Significant Diagnostic Studies: Labs: CMP No results for input(s): "NA", "K", "CL", "CO2", "GLU", "BUN", "CREATININE", "CALCIUM", "PROT", "ALBUMIN", "BILITOT", "ALKPHOS", "AST", "ALT", "ANIONGAP", "ESTGFRAFRICA", "EGFRNONAA" in the last 48 hours. and CBC No results for input(s): "WBC", "HGB", "HCT", "PLT" in the last 48 hours.  Microbiology:   Urine Culture    Lab Results   Component Value Date    LABURIN (A) 08/27/2023     PSEUDOMONAS AERUGINOSA  >100,000 cfu/ml  For susceptibility see order #[id number]         Pending Diagnostic Studies:     None         Medications:  Reconciled Home Medications:      Medication List      CONTINUE taking these medications    acetaminophen 500 MG tablet  Commonly known as: TYLENOL  Take 500 mg by mouth every 6 (six) hours as needed for Pain.     amLODIPine 10 MG tablet  Commonly known as: NORVASC  Take 5 mg by mouth once daily.     aspirin 81 MG EC tablet  Commonly known as: ECOTRIN  Take 81 mg by mouth once daily.     atorvastatin 80 MG tablet  Commonly known as: LIPITOR  Take 80 mg by mouth once daily.     empagliflozin 25 mg tablet  Commonly known as: Jardiance  Take 0.5 mg by mouth every other day.     ibuprofen 200 MG tablet  Commonly known as: ADVIL,MOTRIN  Take 200 mg by mouth every 6 (six) hours as needed for Pain.     metoprolol succinate 25 MG 24 hr tablet  Commonly known as: " TOPROL-XL  Take 25 mg by mouth once daily.     oxyCODONE-acetaminophen 5-325 mg per tablet  Commonly known as: PERCOCET  Take 1 tablet by mouth every 6 (six) hours as needed for Pain.     phenazopyridine 100 MG tablet  Commonly known as: PYRIDIUM  Take 100 mg by mouth 3 (three) times daily as needed for Pain.        STOP taking these medications    cefUROXime 500 MG tablet  Commonly known as: CEFTIN        ASK your doctor about these medications    cefepime 2 g/100 mL D5W 2 g/100 mL   Inject 100 mLs (2 g total) into the vein every 12 (twelve) hours. for 6 days  Ask about: Should I take this medication?            Indwelling Lines/Drains at time of discharge:   Lines/Drains/Airways     None                 Time spent on the discharge of patient: 45 minutes         Jaz Kwan MD  Department of Hospital Medicine  [facility]

## 2023-12-04 PROBLEM — N39.0 UTI (URINARY TRACT INFECTION): Status: RESOLVED | Noted: 2023-08-27 | Resolved: 2023-12-04

## 2024-02-22 ENCOUNTER — HOSPITAL ENCOUNTER (OUTPATIENT)
Facility: HOSPITAL | Age: 79
Discharge: HOME | End: 2024-02-23
Attending: EMERGENCY MEDICINE | Admitting: INTERNAL MEDICINE
Payer: OTHER GOVERNMENT

## 2024-02-22 DIAGNOSIS — R07.9 CHEST PAIN: ICD-10-CM

## 2024-02-22 DIAGNOSIS — R53.1 WEAKNESS: ICD-10-CM

## 2024-02-22 DIAGNOSIS — T40.2X1A OPIOID OVERDOSE, ACCIDENTAL OR UNINTENTIONAL, INITIAL ENCOUNTER: Primary | ICD-10-CM

## 2024-02-22 LAB
ALBUMIN SERPL BCP-MCNC: 3.9 G/DL (ref 3.5–5.2)
ALP SERPL-CCNC: 89 U/L (ref 55–135)
ALT SERPL W/O P-5'-P-CCNC: 9 U/L (ref 10–44)
ANION GAP SERPL CALC-SCNC: 9 MMOL/L (ref 8–16)
AST SERPL-CCNC: 10 U/L (ref 10–40)
BASOPHILS # BLD AUTO: 0.06 K/UL (ref 0–0.2)
BASOPHILS NFR BLD: 0.6 % (ref 0–1.9)
BILIRUB SERPL-MCNC: 0.6 MG/DL (ref 0.1–1)
BNP SERPL-MCNC: 38 PG/ML (ref 0–99)
BUN SERPL-MCNC: 15 MG/DL (ref 8–23)
CALCIUM SERPL-MCNC: 8.9 MG/DL (ref 8.7–10.5)
CHLORIDE SERPL-SCNC: 107 MMOL/L (ref 95–110)
CO2 SERPL-SCNC: 21 MMOL/L (ref 23–29)
CREAT SERPL-MCNC: 0.9 MG/DL (ref 0.5–1.4)
DIFFERENTIAL METHOD BLD: ABNORMAL
EOSINOPHIL # BLD AUTO: 0 K/UL (ref 0–0.5)
EOSINOPHIL NFR BLD: 0 % (ref 0–8)
ERYTHROCYTE [DISTWIDTH] IN BLOOD BY AUTOMATED COUNT: 14.4 % (ref 11.5–14.5)
EST. GFR  (NO RACE VARIABLE): >60 ML/MIN/1.73 M^2
GLUCOSE SERPL-MCNC: 133 MG/DL (ref 70–110)
HCT VFR BLD AUTO: 43.6 % (ref 40–54)
HGB BLD-MCNC: 14.1 G/DL (ref 14–18)
IMM GRANULOCYTES # BLD AUTO: 0.02 K/UL (ref 0–0.04)
IMM GRANULOCYTES NFR BLD AUTO: 0.2 % (ref 0–0.5)
INFLUENZA A, MOLECULAR: NEGATIVE
INFLUENZA B, MOLECULAR: NEGATIVE
LACTATE SERPL-SCNC: 0.8 MMOL/L (ref 0.5–1.9)
LYMPHOCYTES # BLD AUTO: 0.6 K/UL (ref 1–4.8)
LYMPHOCYTES NFR BLD: 5.7 % (ref 18–48)
MAGNESIUM SERPL-MCNC: 1.7 MG/DL (ref 1.6–2.6)
MCH RBC QN AUTO: 27.9 PG (ref 27–31)
MCHC RBC AUTO-ENTMCNC: 32.3 G/DL (ref 32–36)
MCV RBC AUTO: 86 FL (ref 82–98)
MONOCYTES # BLD AUTO: 0.7 K/UL (ref 0.3–1)
MONOCYTES NFR BLD: 6.1 % (ref 4–15)
NEUTROPHILS # BLD AUTO: 9.3 K/UL (ref 1.8–7.7)
NEUTROPHILS NFR BLD: 87.4 % (ref 38–73)
NRBC BLD-RTO: 0 /100 WBC
PLATELET # BLD AUTO: 246 K/UL (ref 150–450)
PMV BLD AUTO: 10.4 FL (ref 9.2–12.9)
POTASSIUM SERPL-SCNC: 3.6 MMOL/L (ref 3.5–5.1)
PROT SERPL-MCNC: 7 G/DL (ref 6–8.4)
RBC # BLD AUTO: 5.05 M/UL (ref 4.6–6.2)
SARS-COV-2 RDRP RESP QL NAA+PROBE: NEGATIVE
SODIUM SERPL-SCNC: 137 MMOL/L (ref 136–145)
SPECIMEN SOURCE: NORMAL
TROPONIN I SERPL HS-MCNC: 5.9 PG/ML (ref 0–14.9)
WBC # BLD AUTO: 10.64 K/UL (ref 3.9–12.7)

## 2024-02-22 PROCEDURE — 83880 ASSAY OF NATRIURETIC PEPTIDE: CPT | Performed by: EMERGENCY MEDICINE

## 2024-02-22 PROCEDURE — 25000003 PHARM REV CODE 250: Performed by: EMERGENCY MEDICINE

## 2024-02-22 PROCEDURE — 87086 URINE CULTURE/COLONY COUNT: CPT | Performed by: EMERGENCY MEDICINE

## 2024-02-22 PROCEDURE — 96361 HYDRATE IV INFUSION ADD-ON: CPT

## 2024-02-22 PROCEDURE — 80053 COMPREHEN METABOLIC PANEL: CPT | Performed by: EMERGENCY MEDICINE

## 2024-02-22 PROCEDURE — 99285 EMERGENCY DEPT VISIT HI MDM: CPT | Mod: 25

## 2024-02-22 PROCEDURE — 83735 ASSAY OF MAGNESIUM: CPT | Performed by: EMERGENCY MEDICINE

## 2024-02-22 PROCEDURE — 93010 ELECTROCARDIOGRAM REPORT: CPT | Mod: ,,, | Performed by: GENERAL PRACTICE

## 2024-02-22 PROCEDURE — 87147 CULTURE TYPE IMMUNOLOGIC: CPT | Performed by: EMERGENCY MEDICINE

## 2024-02-22 PROCEDURE — 87040 BLOOD CULTURE FOR BACTERIA: CPT | Mod: 59 | Performed by: EMERGENCY MEDICINE

## 2024-02-22 PROCEDURE — 84484 ASSAY OF TROPONIN QUANT: CPT | Performed by: EMERGENCY MEDICINE

## 2024-02-22 PROCEDURE — 80307 DRUG TEST PRSMV CHEM ANLYZR: CPT | Performed by: EMERGENCY MEDICINE

## 2024-02-22 PROCEDURE — 93005 ELECTROCARDIOGRAM TRACING: CPT | Performed by: GENERAL PRACTICE

## 2024-02-22 PROCEDURE — 36415 COLL VENOUS BLD VENIPUNCTURE: CPT | Performed by: EMERGENCY MEDICINE

## 2024-02-22 PROCEDURE — 81001 URINALYSIS AUTO W/SCOPE: CPT | Mod: XB | Performed by: EMERGENCY MEDICINE

## 2024-02-22 PROCEDURE — 85025 COMPLETE CBC W/AUTO DIFF WBC: CPT | Performed by: EMERGENCY MEDICINE

## 2024-02-22 PROCEDURE — 87502 INFLUENZA DNA AMP PROBE: CPT | Performed by: EMERGENCY MEDICINE

## 2024-02-22 PROCEDURE — U0002 COVID-19 LAB TEST NON-CDC: HCPCS | Performed by: EMERGENCY MEDICINE

## 2024-02-22 PROCEDURE — 83605 ASSAY OF LACTIC ACID: CPT | Performed by: EMERGENCY MEDICINE

## 2024-02-22 RX ADMIN — SODIUM CHLORIDE 1000 ML: 9 INJECTION, SOLUTION INTRAVENOUS at 11:02

## 2024-02-23 VITALS
WEIGHT: 171 LBS | TEMPERATURE: 98 F | DIASTOLIC BLOOD PRESSURE: 65 MMHG | SYSTOLIC BLOOD PRESSURE: 130 MMHG | RESPIRATION RATE: 18 BRPM | BODY MASS INDEX: 27.48 KG/M2 | HEART RATE: 70 BPM | OXYGEN SATURATION: 95 % | HEIGHT: 66 IN

## 2024-02-23 PROBLEM — G93.40 ENCEPHALOPATHY ACUTE: Status: ACTIVE | Noted: 2024-02-23

## 2024-02-23 LAB
ALBUMIN SERPL BCP-MCNC: 3.6 G/DL (ref 3.5–5.2)
ALP SERPL-CCNC: 73 U/L (ref 55–135)
ALT SERPL W/O P-5'-P-CCNC: 8 U/L (ref 10–44)
AMPHET+METHAMPHET UR QL: NEGATIVE
ANION GAP SERPL CALC-SCNC: 9 MMOL/L (ref 8–16)
AST SERPL-CCNC: 9 U/L (ref 10–40)
BACTERIA #/AREA URNS HPF: ABNORMAL /HPF
BARBITURATES UR QL SCN>200 NG/ML: NEGATIVE
BASOPHILS # BLD AUTO: 0.07 K/UL (ref 0–0.2)
BASOPHILS NFR BLD: 0.7 % (ref 0–1.9)
BENZODIAZ UR QL SCN>200 NG/ML: NEGATIVE
BILIRUB SERPL-MCNC: 0.8 MG/DL (ref 0.1–1)
BILIRUB UR QL STRIP: NEGATIVE
BUN SERPL-MCNC: 14 MG/DL (ref 8–23)
BZE UR QL SCN: NEGATIVE
CALCIUM SERPL-MCNC: 8.7 MG/DL (ref 8.7–10.5)
CANNABINOIDS UR QL SCN: NEGATIVE
CHLORIDE SERPL-SCNC: 110 MMOL/L (ref 95–110)
CLARITY UR: ABNORMAL
CO2 SERPL-SCNC: 19 MMOL/L (ref 23–29)
COLOR UR: YELLOW
CREAT SERPL-MCNC: 0.9 MG/DL (ref 0.5–1.4)
CREAT UR-MCNC: 47.9 MG/DL (ref 23–375)
DIFFERENTIAL METHOD BLD: ABNORMAL
EOSINOPHIL # BLD AUTO: 0 K/UL (ref 0–0.5)
EOSINOPHIL NFR BLD: 0.2 % (ref 0–8)
ERYTHROCYTE [DISTWIDTH] IN BLOOD BY AUTOMATED COUNT: 14.3 % (ref 11.5–14.5)
EST. GFR  (NO RACE VARIABLE): >60 ML/MIN/1.73 M^2
GLUCOSE SERPL-MCNC: 136 MG/DL (ref 70–110)
GLUCOSE UR QL STRIP: ABNORMAL
HCT VFR BLD AUTO: 40.4 % (ref 40–54)
HGB BLD-MCNC: 13.3 G/DL (ref 14–18)
HGB UR QL STRIP: ABNORMAL
HYALINE CASTS #/AREA URNS LPF: 2 /LPF
IMM GRANULOCYTES # BLD AUTO: 0.03 K/UL (ref 0–0.04)
IMM GRANULOCYTES NFR BLD AUTO: 0.3 % (ref 0–0.5)
KETONES UR QL STRIP: ABNORMAL
LEUKOCYTE ESTERASE UR QL STRIP: ABNORMAL
LYMPHOCYTES # BLD AUTO: 1.1 K/UL (ref 1–4.8)
LYMPHOCYTES NFR BLD: 11.3 % (ref 18–48)
MAGNESIUM SERPL-MCNC: 1.8 MG/DL (ref 1.6–2.6)
MCH RBC QN AUTO: 27.8 PG (ref 27–31)
MCHC RBC AUTO-ENTMCNC: 32.9 G/DL (ref 32–36)
MCV RBC AUTO: 85 FL (ref 82–98)
MICROSCOPIC COMMENT: ABNORMAL
MONOCYTES # BLD AUTO: 1.1 K/UL (ref 0.3–1)
MONOCYTES NFR BLD: 10.9 % (ref 4–15)
NEUTROPHILS # BLD AUTO: 7.6 K/UL (ref 1.8–7.7)
NEUTROPHILS NFR BLD: 76.6 % (ref 38–73)
NITRITE UR QL STRIP: NEGATIVE
NRBC BLD-RTO: 0 /100 WBC
OPIATES UR QL SCN: NEGATIVE
PCP UR QL SCN>25 NG/ML: NEGATIVE
PH UR STRIP: 6 [PH] (ref 5–8)
PLATELET # BLD AUTO: 237 K/UL (ref 150–450)
PMV BLD AUTO: 10.7 FL (ref 9.2–12.9)
POTASSIUM SERPL-SCNC: 3.4 MMOL/L (ref 3.5–5.1)
PROT SERPL-MCNC: 6.7 G/DL (ref 6–8.4)
PROT UR QL STRIP: ABNORMAL
RBC # BLD AUTO: 4.78 M/UL (ref 4.6–6.2)
RBC #/AREA URNS HPF: >100 /HPF (ref 0–4)
SODIUM SERPL-SCNC: 138 MMOL/L (ref 136–145)
SP GR UR STRIP: 1.02 (ref 1–1.03)
SQUAMOUS #/AREA URNS HPF: 6 /HPF
TOXICOLOGY INFORMATION: NORMAL
URN SPEC COLLECT METH UR: ABNORMAL
UROBILINOGEN UR STRIP-ACNC: NEGATIVE EU/DL
WBC # BLD AUTO: 9.87 K/UL (ref 3.9–12.7)
WBC #/AREA URNS HPF: 45 /HPF (ref 0–5)
YEAST URNS QL MICRO: ABNORMAL

## 2024-02-23 PROCEDURE — G0378 HOSPITAL OBSERVATION PER HR: HCPCS

## 2024-02-23 PROCEDURE — 80053 COMPREHEN METABOLIC PANEL: CPT | Performed by: INTERNAL MEDICINE

## 2024-02-23 PROCEDURE — 96375 TX/PRO/DX INJ NEW DRUG ADDON: CPT

## 2024-02-23 PROCEDURE — 25000003 PHARM REV CODE 250: Performed by: STUDENT IN AN ORGANIZED HEALTH CARE EDUCATION/TRAINING PROGRAM

## 2024-02-23 PROCEDURE — 96367 TX/PROPH/DG ADDL SEQ IV INF: CPT

## 2024-02-23 PROCEDURE — 96366 THER/PROPH/DIAG IV INF ADDON: CPT

## 2024-02-23 PROCEDURE — 85025 COMPLETE CBC W/AUTO DIFF WBC: CPT | Performed by: INTERNAL MEDICINE

## 2024-02-23 PROCEDURE — 63600175 PHARM REV CODE 636 W HCPCS: Performed by: STUDENT IN AN ORGANIZED HEALTH CARE EDUCATION/TRAINING PROGRAM

## 2024-02-23 PROCEDURE — 25000003 PHARM REV CODE 250: Performed by: INTERNAL MEDICINE

## 2024-02-23 PROCEDURE — 97161 PT EVAL LOW COMPLEX 20 MIN: CPT

## 2024-02-23 PROCEDURE — 63600175 PHARM REV CODE 636 W HCPCS: Performed by: INTERNAL MEDICINE

## 2024-02-23 PROCEDURE — 96365 THER/PROPH/DIAG IV INF INIT: CPT | Mod: 59

## 2024-02-23 PROCEDURE — 96365 THER/PROPH/DIAG IV INF INIT: CPT

## 2024-02-23 PROCEDURE — 83735 ASSAY OF MAGNESIUM: CPT | Performed by: INTERNAL MEDICINE

## 2024-02-23 RX ORDER — SODIUM CHLORIDE 0.9 % (FLUSH) 0.9 %
10 SYRINGE (ML) INJECTION EVERY 12 HOURS PRN
Status: DISCONTINUED | OUTPATIENT
Start: 2024-02-23 | End: 2024-02-23 | Stop reason: HOSPADM

## 2024-02-23 RX ORDER — ASPIRIN 81 MG/1
81 TABLET ORAL DAILY
Status: CANCELLED | OUTPATIENT
Start: 2024-02-23

## 2024-02-23 RX ORDER — LANOLIN ALCOHOL/MO/W.PET/CERES
800 CREAM (GRAM) TOPICAL
Status: DISCONTINUED | OUTPATIENT
Start: 2024-02-23 | End: 2024-02-23 | Stop reason: HOSPADM

## 2024-02-23 RX ORDER — ACETAMINOPHEN 325 MG/1
650 TABLET ORAL EVERY 4 HOURS PRN
Status: DISCONTINUED | OUTPATIENT
Start: 2024-02-23 | End: 2024-02-23 | Stop reason: HOSPADM

## 2024-02-23 RX ORDER — GLUCAGON 1 MG
1 KIT INJECTION
Status: DISCONTINUED | OUTPATIENT
Start: 2024-02-23 | End: 2024-02-23 | Stop reason: HOSPADM

## 2024-02-23 RX ORDER — KETOROLAC TROMETHAMINE 30 MG/ML
15 INJECTION, SOLUTION INTRAMUSCULAR; INTRAVENOUS EVERY 6 HOURS PRN
Status: DISCONTINUED | OUTPATIENT
Start: 2024-02-23 | End: 2024-02-23 | Stop reason: HOSPADM

## 2024-02-23 RX ORDER — SODIUM,POTASSIUM PHOSPHATES 280-250MG
2 POWDER IN PACKET (EA) ORAL
Status: DISCONTINUED | OUTPATIENT
Start: 2024-02-23 | End: 2024-02-23 | Stop reason: HOSPADM

## 2024-02-23 RX ORDER — TALC
6 POWDER (GRAM) TOPICAL NIGHTLY PRN
Status: DISCONTINUED | OUTPATIENT
Start: 2024-02-23 | End: 2024-02-23 | Stop reason: HOSPADM

## 2024-02-23 RX ORDER — CEFDINIR 300 MG/1
300 CAPSULE ORAL 2 TIMES DAILY
Qty: 10 CAPSULE | Refills: 0 | Status: SHIPPED | OUTPATIENT
Start: 2024-02-23 | End: 2024-02-28

## 2024-02-23 RX ORDER — SODIUM CHLORIDE AND POTASSIUM CHLORIDE 150; 900 MG/100ML; MG/100ML
INJECTION, SOLUTION INTRAVENOUS CONTINUOUS
Status: DISCONTINUED | OUTPATIENT
Start: 2024-02-23 | End: 2024-02-23 | Stop reason: HOSPADM

## 2024-02-23 RX ORDER — IBUPROFEN 200 MG
24 TABLET ORAL
Status: DISCONTINUED | OUTPATIENT
Start: 2024-02-23 | End: 2024-02-23 | Stop reason: HOSPADM

## 2024-02-23 RX ORDER — ACETAMINOPHEN 325 MG/1
650 TABLET ORAL EVERY 8 HOURS PRN
Status: DISCONTINUED | OUTPATIENT
Start: 2024-02-23 | End: 2024-02-23 | Stop reason: HOSPADM

## 2024-02-23 RX ORDER — NALOXONE HCL 0.4 MG/ML
0.02 VIAL (ML) INJECTION
Status: DISCONTINUED | OUTPATIENT
Start: 2024-02-23 | End: 2024-02-23 | Stop reason: HOSPADM

## 2024-02-23 RX ORDER — METOPROLOL SUCCINATE 25 MG/1
25 TABLET, EXTENDED RELEASE ORAL DAILY
Status: CANCELLED | OUTPATIENT
Start: 2024-02-23

## 2024-02-23 RX ORDER — AMOXICILLIN 250 MG
1 CAPSULE ORAL DAILY
Status: DISCONTINUED | OUTPATIENT
Start: 2024-02-23 | End: 2024-02-23 | Stop reason: HOSPADM

## 2024-02-23 RX ORDER — ALUMINUM HYDROXIDE, MAGNESIUM HYDROXIDE, AND SIMETHICONE 1200; 120; 1200 MG/30ML; MG/30ML; MG/30ML
30 SUSPENSION ORAL 4 TIMES DAILY PRN
Status: DISCONTINUED | OUTPATIENT
Start: 2024-02-23 | End: 2024-02-23 | Stop reason: HOSPADM

## 2024-02-23 RX ORDER — ONDANSETRON HYDROCHLORIDE 2 MG/ML
4 INJECTION, SOLUTION INTRAVENOUS EVERY 6 HOURS PRN
Status: DISCONTINUED | OUTPATIENT
Start: 2024-02-23 | End: 2024-02-23 | Stop reason: HOSPADM

## 2024-02-23 RX ORDER — CIPROFLOXACIN 500 MG/1
500 TABLET ORAL 2 TIMES DAILY
Qty: 10 TABLET | Refills: 0 | Status: SHIPPED | OUTPATIENT
Start: 2024-02-23 | End: 2024-02-23 | Stop reason: HOSPADM

## 2024-02-23 RX ORDER — IBUPROFEN 200 MG
16 TABLET ORAL
Status: DISCONTINUED | OUTPATIENT
Start: 2024-02-23 | End: 2024-02-23 | Stop reason: HOSPADM

## 2024-02-23 RX ORDER — PHENAZOPYRIDINE HYDROCHLORIDE 100 MG/1
100 TABLET, FILM COATED ORAL 3 TIMES DAILY PRN
Status: CANCELLED | OUTPATIENT
Start: 2024-02-23

## 2024-02-23 RX ORDER — FAMOTIDINE 10 MG/ML
20 INJECTION INTRAVENOUS 2 TIMES DAILY
Status: DISCONTINUED | OUTPATIENT
Start: 2024-02-23 | End: 2024-02-23 | Stop reason: HOSPADM

## 2024-02-23 RX ADMIN — CEFEPIME 1 G: 1 INJECTION, POWDER, FOR SOLUTION INTRAMUSCULAR; INTRAVENOUS at 05:02

## 2024-02-23 RX ADMIN — PIPERACILLIN AND TAZOBACTAM 4.5 G: 4; .5 INJECTION, POWDER, LYOPHILIZED, FOR SOLUTION INTRAVENOUS at 04:02

## 2024-02-23 RX ADMIN — POTASSIUM BICARBONATE 35 MEQ: 391 TABLET, EFFERVESCENT ORAL at 08:02

## 2024-02-23 RX ADMIN — POTASSIUM BICARBONATE 35 MEQ: 391 TABLET, EFFERVESCENT ORAL at 11:02

## 2024-02-23 RX ADMIN — Medication 800 MG: at 08:02

## 2024-02-23 RX ADMIN — Medication 800 MG: at 12:02

## 2024-02-23 RX ADMIN — SODIUM CHLORIDE AND POTASSIUM CHLORIDE: .9; .15 SOLUTION INTRAVENOUS at 05:02

## 2024-02-23 RX ADMIN — KETOROLAC TROMETHAMINE 15 MG: 30 INJECTION, SOLUTION INTRAMUSCULAR; INTRAVENOUS at 11:02

## 2024-02-23 RX ADMIN — FAMOTIDINE 20 MG: 10 INJECTION INTRAVENOUS at 08:02

## 2024-02-23 NOTE — ASSESSMENT & PLAN NOTE
I'm not sure exactly what's wrong with him     His UA really doesn't even show UTI     I'm starting him on Cefepime which was used last time here for his Pseudomonas UTI , for now just to be safe.      But I really wonder if this is not from his Oxycodone.   Maybe he took more ??  The wife said he takes hydrocodone.    But I see oxycodone on the med rec     He is just acting sedated.      He has no neuro deficits.  Moves x 4        PLAN    - check new labs   - run IVFs  NS  - let him eat if he can swallow  - run Cefepime for now, but we can DC after few doses probably   - consult PT if he wakes up more to see how he can do standing up and walking   - fall precautions   - telemetry   - Hold off on any more narcotics

## 2024-02-23 NOTE — ED PROVIDER NOTES
Encounter Date: 2/22/2024       History     Chief Complaint   Patient presents with    Weakness     Started after his BCG treatment today.       Emergent evaluation of a 78-year-old male with history of bladder cancer currently undergoing BCG treatments once weekly and had this treatment done today.  He reports afterward he was some times have some penile pain and dysuria which she was had today.  But wife reports today he was acutely altered incredibly weak could not sit up or stand on his own was urinating on himself was confused and could not remember the events of today.  On arrival here he was reporting lower abdominal pain.  No chest pain or shortness of breath he does answer questions appropriately but does not remember riding in the ambulance or the IV stick.  No chest pain or shortness of breath he denies any head injury.  Wife reports he took 1 pain pill earlier does not believe he took too much pain medication.      Review of patient's allergies indicates:  No Known Allergies  No past medical history on file.  No past surgical history on file.  No family history on file.  Social History     Substance Use Topics    Alcohol use: Not Currently     Review of Systems   Constitutional:  Positive for activity change and fatigue. Negative for appetite change, chills, diaphoresis and fever.   HENT:  Negative for congestion, postnasal drip and rhinorrhea.    Respiratory:  Negative for cough, chest tightness, shortness of breath and wheezing.    Cardiovascular:  Negative for chest pain and palpitations.   Gastrointestinal:  Positive for abdominal pain. Negative for constipation, diarrhea, nausea and vomiting.   Genitourinary:  Positive for dysuria. Negative for frequency and urgency.   Musculoskeletal:  Negative for neck pain and neck stiffness.   Neurological:  Positive for weakness and light-headedness. Negative for dizziness, numbness and headaches.   Psychiatric/Behavioral:  Positive for behavioral problems and  confusion. Negative for agitation.    All other systems reviewed and are negative.      Physical Exam     Initial Vitals   BP Pulse Resp Temp SpO2   02/22/24 2225 02/22/24 2225 02/22/24 2225 02/22/24 2335 02/22/24 2225   134/80 98 18 97.9 °F (36.6 °C) 95 %      MAP       --                Physical Exam    Nursing note and vitals reviewed.  Constitutional: He appears well-developed. He is not diaphoretic. He appears distressed.   HENT:   Head: Normocephalic and atraumatic.   Right Ear: External ear normal.   Left Ear: External ear normal.   Nose: Nose normal.   Mouth/Throat: Oropharynx is clear and moist.   Eyes: Conjunctivae and EOM are normal. Pupils are equal, round, and reactive to light.   Neck: Neck supple. No tracheal deviation present.   Normal range of motion.  Cardiovascular:  Normal rate, regular rhythm, normal heart sounds and intact distal pulses.     Exam reveals no gallop and no friction rub.       No murmur heard.  Pulmonary/Chest: Breath sounds normal. No stridor. No respiratory distress. He has no wheezes. He has no rhonchi. He has no rales. He exhibits no tenderness.   Abdominal: Abdomen is soft. Bowel sounds are normal. He exhibits no distension and no mass. There is abdominal tenderness.   Mild suprapubic tenderness. There is no rebound and no guarding.   Genitourinary:    Genitourinary Comments: Dark yellow urine with dysuria when urinating urinary incontinence on his clothing when he arrived     Musculoskeletal:         General: No edema. Normal range of motion.      Cervical back: Normal range of motion and neck supple.     Neurological: He is alert and oriented to person, place, and time. He has normal strength. No cranial nerve deficit or sensory deficit.   Generalized weakness patient was unable to sit up in bed on his own.  Keeps eyes closed answers questions appropriately.  Confusion about events of today and does not remember riding in the EMS ambulance or having blood drawn   Skin: Skin  is warm and dry. No rash noted. No erythema. There is pallor.   Psychiatric: He has a normal mood and affect. His behavior is normal. Judgment normal.         ED Course   Procedures  Labs Reviewed   CBC W/ AUTO DIFFERENTIAL - Abnormal; Notable for the following components:       Result Value    Gran # (ANC) 9.3 (*)     Lymph # 0.6 (*)     Gran % 87.4 (*)     Lymph % 5.7 (*)     All other components within normal limits   COMPREHENSIVE METABOLIC PANEL - Abnormal; Notable for the following components:    CO2 21 (*)     Glucose 133 (*)     ALT 9 (*)     All other components within normal limits   URINALYSIS, REFLEX TO URINE CULTURE - Abnormal; Notable for the following components:    Appearance, UA Cloudy (*)     Protein, UA 2+ (*)     Glucose, UA 3+ (*)     Ketones, UA 2+ (*)     Occult Blood UA 3+ (*)     Leukocytes, UA 1+ (*)     All other components within normal limits    Narrative:     Specimen Source->Urine   URINALYSIS MICROSCOPIC - Abnormal; Notable for the following components:    RBC, UA >100 (*)     WBC, UA 45 (*)     Hyaline Casts, UA 2 (*)     All other components within normal limits    Narrative:     Specimen Source->Urine   CULTURE, BLOOD   CULTURE, BLOOD   CULTURE, URINE   B-TYPE NATRIURETIC PEPTIDE   DRUG SCREEN PANEL, URINE EMERGENCY    Narrative:     Specimen Source->Urine   TROPONIN I HIGH SENSITIVITY   MAGNESIUM   INFLUENZA A AND B ANTIGEN    Narrative:     Specimen Source->Nasopharyngeal Swab   SARS-COV-2 RNA AMPLIFICATION, QUAL   LACTIC ACID, PLASMA     EKG Readings: (Independently Interpreted)   Initial Reading: No STEMI. Rhythm: Normal Sinus Rhythm. Heart Rate: 89. Ectopy: No Ectopy. Conduction: Normal.       Imaging Results              CT Head Without Contrast (Final result)  Result time 02/22/24 23:42:45      Final result by Tracey Renteria DO (02/22/24 23:42:45)                   Narrative:    EXAM:  CT Head Without Intravenous Contrast    CLINICAL HISTORY:  Mental status change,  unknown cause; bladder ca, confusion memory issues.    TECHNIQUE:  CT images of the head/brain without intravenous contrast. Axial, coronal, and sagittal images.  This CT exam was performed using one or more of the following dose reduction techniques:  automated exposure control, adjustment of the mA and/or kV according to patient size, and/or use of iterative reconstruction technique.    COMPARISON:  8/27/2023    FINDINGS:  Brain:  No acute hemorrhage. No evidence of acute infarct; MRI more sensitive. Periventricular and subcortical white matter hypodensities are nonspecific but most commonly due to chronic small vessel ischemic changes in a patient of this age.  Ventricles:  No hydrocephalus.  Bones/joints:  No skull fracture.  Soft tissues:  No acute findings.  Vasculature:  Vascular calcifications.  Sinuses:  No paranasal sinus air-fluid level. Mucosal cyst versus polyp right maxillary sinus.  Mastoid air cells:  No mastoid effusion.    IMPRESSION:  No acute intracranial abnormality.    Electronically signed by:  Cecilia Gupta MD  02/22/2024 11:42 PM CST Workstation: [id number]                                     X-Ray Chest AP Portable (Final result)  Result time 02/22/24 23:43:27      Final result by Tracey Renteria DO (02/22/24 23:43:27)                   Narrative:    EXAM:  XR Chest, 1 View    CLINICAL HISTORY:  .    TECHNIQUE:  Frontal radiograph of the chest.    COMPARISON:  3/17/2019    FINDINGS:  Lungs:  Low lung volumes. No focal consolidation.  Pleural space:  No sizeable pleural effusion or pneumothorax.  Heart:  Cardiac silhouette appears stable.  Mediastinum:  Mediastinal contour appears similar.  Bones/joints:  No acute osseous abnormality.  Tubes, lines and devices:  Overlying monitor leads.    IMPRESSION:  No definite acute findings on this portable exam. Follow-up as clinically indicated.    Electronically signed by:  Cecilia Gupta MD  02/22/2024 11:43 PM CST Workstation:  [id number]                                  X-Rays:   Independently Interpreted Readings:   Chest X-Ray: Normal heart size.  No infiltrates.  No acute abnormalities.     Medications   sodium chloride 0.9% bolus 1,000 mL 1,000 mL (0 mLs Intravenous Stopped 2/23/24 0018)     Medical Decision Making  Emergent evaluation of a 78-year-old male with history of bladder cancer currently undergoing BCG treatments once weekly and had this treatment done today.  He reports afterward he was some times have some penile pain and dysuria which she was had today.  But wife reports today he was acutely altered incredibly weak could not sit up or stand on his own was urinating on himself was confused and could not remember the events of today.  On arrival here he was reporting lower abdominal pain.  No chest pain or shortness of breath he does answer questions appropriately but does not remember riding in the ambulance or the IV stick.  No chest pain or shortness of breath he denies any head injury.  Wife reports he took 1 pain pill earlier does not believe he took too much pain medication.  [facility]    Patient presents for emergent evaluation of acute weakness confusion altered mental status after treatment for bladder cancer today that poses a possible threat to life and/or bodily function.    Differential diagnosis includes but is not limited to sepsis, electrolyte abnormalities dehydration bladder perforation peritonitis UTI pyelo.  In the ED patient found to have acute weakness altered mental status amnesia abdominal pain bladder cancer.   I ordered labs and personally reviewed them.  Labs significant for see below.    I ordered X-rays and personally reviewed them and reviewed the radiologist interpretation.  Xray significant for see below.    I ordered EKG and personally reviewed it.  EKG significant for see below  CT head ordered revealed no acute abnormality    Admission [facility]  I discussed the patient presentation labs, ekg,  X-rays, CT findings with the Hospitalist   Patient was managed in the ED with IV 1 L normal saline.  The response to treatment was fair.  Patient has weakness and mental status change has not changed.  He still is unable to sit up in bed is now prefers to lay in bed with his eyes closed versus when I originally was conversing with them he was eyes were open he was attempting to sit up in bed.  Normal vital signs.  Blood cultures pending    Patient required emergent consultation to hospitalist for admission.  Aura Del Cid M.D.         Amount and/or Complexity of Data Reviewed  Labs: ordered.     Details: Lactate 0.8  BNP 38  Normal CBC  Normal CMP  Troponin 5.9 Mag 1.7  Tox negative  Urine is cloudy 2+ protein 3+ glucose 2+ ketones 3+ blood 1+ leuks more than 100 red blood cells 45 white blood cells occasional bacteria no yeast  COVID in flu negative  Blood cultures pending  Radiology: ordered.    Risk  Decision regarding hospitalization.                                      Clinical Impression:  Final diagnoses:  [R53.1] Weakness          ED Disposition Condition    Admit Stable                Aura Del Cid MD  02/23/24 0418

## 2024-02-23 NOTE — HPI
78 WM with pmh of HTN, DMII not on  insulin , Bladder Cancer currently on BCG treatment     Non smoker  Non drinker  Lives at home with wife    He is currently getting BCG infusions into bladder   .  This was his 7th treatment his wife said.    He has done fine with every treatment thus far.      He drives himself to and from his treatments .            This time on the day of admission he had his treatment and was doing fine before it and after.    He drove himself home around 1 pm.        His wife got home later and he was fine then also.    Then he took his pain medication which he has taken before and has no issues with .   He laid down on the couch and fell asleep at 5 pm.   Then at 730 he was acting confused.   He woke up but was not very alert.  And his wife came to help him get up .  But she said he had no strength and couldn't stand.    He was drowsy and confused she said.   And then he fell down once as she was trying to hold him up .   Did not hit his head.         So then she called EMS and he came to our ER    In the ER his labs were all normal   Vitals normal   WBC normal   Troponin normal       When I saw him he was sleeping mostly and had to wake up .    But he would wake up .       His UA really didn't even show a UTI .         He was here few months back with a bad UTI and the wife said he was acting same way. Confused and weak.    It ended up being pseudomonas .  And he was put on Cefepime.     But he's been fine since.   And she said he even had a UA done few days ago to make sure no UTI present before his treatment     Head CT in ER was normal . Negative   He got IV zosyn in ER and 1 liter IVF      The wife did say he takes hydrocodone for pain but I see percocet on his home med rec        We are admitting him to figure out this new encephalopathy

## 2024-02-23 NOTE — PHARMACY MED REC
"Admission Medication History     The home medication history was taken by Tomas Lerma.    You may go to "Admission" then "Reconcile Home Medications" tabs to review and/or act upon these items.     The home medication list has been updated by the Pharmacy department.   Please read ALL comments highlighted in yellow.   Please address this information as you see fit.    Feel free to contact us if you have any questions or require assistance.      The medications listed below were removed from the home medication list. Please reorder if appropriate:  Patient reports no longer taking the following medication(s):  Pyridium 100 mg    Medications listed below were obtained from: Patient/family and Analytic software- [employer]  No current facility-administered medications on file prior to encounter.     Current Outpatient Medications on File Prior to Encounter   Medication Sig Dispense Refill    acetaminophen (TYLENOL) 500 MG tablet Take 500 mg by mouth every 6 (six) hours as needed for Pain.      amLODIPine (NORVASC) 10 MG tablet Take 5 mg by mouth once daily.      aspirin (ECOTRIN) 81 MG EC tablet Take 81 mg by mouth once daily.      atorvastatin (LIPITOR) 80 MG tablet Take 80 mg by mouth once daily.      empagliflozin (JARDIANCE) 25 mg tablet Take 12.5 mg by mouth once daily.      ibuprofen (ADVIL,MOTRIN) 200 MG tablet Take 200 mg by mouth every 6 (six) hours as needed for Pain.      metoprolol succinate (TOPROL-XL) 25 MG 24 hr tablet Take 25 mg by mouth once daily.      oxyCODONE-acetaminophen (PERCOCET) 5-325 mg per tablet Take 1 tablet by mouth every 6 (six) hours as needed for Pain.      [DISCONTINUED] phenazopyridine (PYRIDIUM) 100 MG tablet Take 100 mg by mouth 3 (three) times daily as needed for Pain.           Tomas Lerma  EXT 1923                .          "

## 2024-02-23 NOTE — SUBJECTIVE & OBJECTIVE
No past medical history on file.    No past surgical history on file.    Review of patient's allergies indicates:  No Known Allergies    No current facility-administered medications on file prior to encounter.     Current Outpatient Medications on File Prior to Encounter   Medication Sig    acetaminophen (TYLENOL) 500 MG tablet Take 500 mg by mouth every 6 (six) hours as needed for Pain.    amLODIPine (NORVASC) 10 MG tablet Take 5 mg by mouth once daily.    aspirin (ECOTRIN) 81 MG EC tablet Take 81 mg by mouth once daily.    atorvastatin (LIPITOR) 80 MG tablet Take 80 mg by mouth once daily.    empagliflozin (JARDIANCE) 25 mg tablet Take 0.5 mg by mouth every other day.    ibuprofen (ADVIL,MOTRIN) 200 MG tablet Take 200 mg by mouth every 6 (six) hours as needed for Pain.    metoprolol succinate (TOPROL-XL) 25 MG 24 hr tablet Take 25 mg by mouth once daily.    oxyCODONE-acetaminophen (PERCOCET) 5-325 mg per tablet Take 1 tablet by mouth every 6 (six) hours as needed for Pain.    phenazopyridine (PYRIDIUM) 100 MG tablet Take 100 mg by mouth 3 (three) times daily as needed for Pain.     Family History    None       Tobacco Use    Smoking status: Not on file    Smokeless tobacco: Not on file   Substance and Sexual Activity    Alcohol use: Not Currently    Drug use: Not on file    Sexual activity: Not Currently     Review of Systems   Reason unable to perform ROS: patient asleep confused.     Objective:     Vital Signs (Most Recent):  Temp: 97.9 °F (36.6 °C) (02/22/24 2335)  Pulse: 80 (02/23/24 0400)  Resp: 18 (02/22/24 2225)  BP: 135/66 (02/23/24 0400)  SpO2: (!) 92 % (02/23/24 0400) Vital Signs (24h Range):  Temp:  [97.9 °F (36.6 °C)] 97.9 °F (36.6 °C)  Pulse:  [78-98] 80  Resp:  [18] 18  SpO2:  [92 %-98 %] 92 %  BP: (113-141)/(65-80) 135/66     Weight: 77.6 kg (171 lb)  Body mass index is 27.6 kg/m².     Physical Exam  Vitals and nursing note reviewed.   Constitutional:       Appearance: Normal appearance.   HENT:       Head: Normocephalic.      Nose: Nose normal.      Mouth/Throat:      Mouth: Mucous membranes are dry.   Eyes:      Extraocular Movements: Extraocular movements intact.      Conjunctiva/sclera: Conjunctivae normal.      Comments: Pupils small    Cardiovascular:      Rate and Rhythm: Normal rate and regular rhythm.   Pulmonary:      Effort: Pulmonary effort is normal.      Breath sounds: Normal breath sounds.   Abdominal:      General: Abdomen is flat.      Palpations: Abdomen is soft.   Musculoskeletal:         General: Normal range of motion.      Cervical back: Normal range of motion.   Skin:     General: Skin is warm and dry.      Capillary Refill: Capillary refill takes less than 2 seconds.   Neurological:      General: No focal deficit present.      Comments: Oriented but somnolent. Very drowsy.                   Significant Labs: All pertinent labs within the past 24 hours have been reviewed.  CBC:   Recent Labs   Lab 02/22/24  2313   WBC 10.64   HGB 14.1   HCT 43.6        CMP:   Recent Labs   Lab 02/22/24  2313      K 3.6      CO2 21*   *   BUN 15   CREATININE 0.9   CALCIUM 8.9   PROT 7.0   ALBUMIN 3.9   BILITOT 0.6   ALKPHOS 89   AST 10   ALT 9*   ANIONGAP 9     Urine Studies:   Recent Labs   Lab 02/22/24  2257   COLORU Yellow   APPEARANCEUA Cloudy*   PHUR 6.0   SPECGRAV 1.020   PROTEINUA 2+*   GLUCUA 3+*   KETONESU 2+*   BILIRUBINUA Negative   OCCULTUA 3+*   NITRITE Negative   UROBILINOGEN Negative   LEUKOCYTESUR 1+*   RBCUA >100*   WBCUA 45*   BACTERIA Occasional   SQUAMEPITHEL 6   HYALINECASTS 2*       Significant Imaging: I have reviewed all pertinent imaging results/findings within the past 24 hours.  I have reviewed and interpreted all pertinent imaging results/findings within the past 24 hours.

## 2024-02-23 NOTE — PLAN OF CARE
[facility] - Emergency Dept  Initial Discharge Assessment       Primary Care Provider: [facility]    Admission Diagnosis: Weakness [R53.1]    Admission Date: 2/22/2024  Expected Discharge Date:     Pt is a 78-year-old male who arrived from home with Encephalopathy acute. Information verified as correct on facesheet. The assessment was completed at the patient's bedside.  Pt lives with spouse, Cleo Begum [phone]. Pt does not have a Living Will or Advance Directive. The Pt is oriented to person, places, and times. PCP last seen 6 to 8 months ago.  Pt denies Coumadin, dialysis, DME, and HH. Pt has been readmitted into the hospital in the last thirty days.  Pt is capable of performing ADLs without assistance. Pt drivers to and from medical appointments. Pt reports he takes medication as prescribed; pharmacy used [facility].  Pt verbalized plan to discharge home via family transport. Pt has no other needs to be addressed at this time. CM reviewed the chart and will continue to monitor.       Transition of Care Barriers: None    Payor: [employer] / Plan: [place] [employer] / Product Type: Government /     Extended Emergency Contact Information  Primary Emergency Contact: Cleo Begum  Mobile Phone: [phone]  Relation: Spouse  Preferred language: English   needed? No    Discharge Plan A: Home with family  Discharge Plan B: Home      [facility] - [place] - [address] [address]  [place] [address]  Phone: [phone] Fax: [phone]      Initial Assessment (most recent)       Adult Discharge Assessment - 02/23/24 1401          Discharge Assessment    Assessment Type Discharge Planning Assessment     Confirmed/corrected address, phone number and insurance Yes     Confirmed Demographics Correct on Facesheet     Source of Information patient     When was your last doctors appointment? --   6 to 8 months ago    Does patient/caregiver  understand observation status Yes     Communicated KRISTAN with patient/caregiver Date not available/Unable to determine     Reason For Admission Encephalopathy acute     People in Home spouse     Facility Arrived From: home     Do you expect to return to your current living situation? Yes     Do you have help at home or someone to help you manage your care at home? Yes     Who are your caregiver(s) and their phone number(s)? Cleo Begum/wife [phone]     Prior to hospitilization cognitive status: Alert/Oriented     Current cognitive status: Alert/Oriented     Walking or Climbing Stairs Difficulty no     Dressing/Bathing Difficulty no     Home Accessibility not wheelchair accessible     Equipment Currently Used at Home none     Readmission within 30 days? Yes     Patient currently being followed by outpatient case management? No     Do you currently have service(s) that help you manage your care at home? No     Do you take prescription medications? Yes     Do you have prescription coverage? Yes     Coverage Payor: [employer] - [place] [employer] -     Do you have any problems affording any of your prescribed medications? No     Is the patient taking medications as prescribed? yes     Who is going to help you get home at discharge? Cleo Begum/wife [phone]     How do you get to doctors appointments? car, drives self     Are you on dialysis? No     Do you take coumadin? No     Discharge Plan A Home with family     Discharge Plan B Home     DME Needed Upon Discharge  none     Discharge Plan discussed with: Patient     Transition of Care Barriers None

## 2024-02-23 NOTE — H&P
[facility] - Emergency Dept  Hospital Medicine  History & Physical    Patient Name: Arcadio Begum  MRN: [id number]  Patient Class: Emergency  Admission Date: 2/22/2024  Attending Physician: Elmo Maharaj MD  Primary Care Provider: [facility]         Patient information was obtained from patient and ER records.     Subjective:     Principal Problem:Encephalopathy acute    Chief Complaint:   Chief Complaint   Patient presents with    Weakness     Started after his BCG treatment today.          HPI: 78 WM with pmh of HTN, DMII not on  insulin , Bladder Cancer currently on BCG treatment     Non smoker  Non drinker  Lives at home with wife    He is currently getting BCG infusions into bladder   .  This was his 7th treatment his wife said.    He has done fine with every treatment thus far.      He drives himself to and from his treatments .            This time on the day of admission he had his treatment and was doing fine before it and after.    He drove himself home around 1 pm.        His wife got home later and he was fine then also.    Then he took his pain medication which he has taken before and has no issues with .   He laid down on the couch and fell asleep at 5 pm.   Then at 730 he was acting confused.   He woke up but was not very alert.  And his wife came to help him get up .  But she said he had no strength and couldn't stand.    He was drowsy and confused she said.   And then he fell down once as she was trying to hold him up .   Did not hit his head.         So then she called EMS and he came to our ER    In the ER his labs were all normal   Vitals normal   WBC normal   Troponin normal       When I saw him he was sleeping mostly and had to wake up .    But he would wake up .       His UA really didn't even show a UTI .         He was here few months back with a bad UTI and the wife said he was acting same way. Confused and weak.    It ended up being pseudomonas .  And he  was put on Cefepime.     But he's been fine since.   And she said he even had a UA done few days ago to make sure no UTI present before his treatment     Head CT in ER was normal . Negative   He got IV zosyn in ER and 1 liter IVF      The wife did say he takes hydrocodone for pain but I see percocet on his home med rec        We are admitting him to figure out this new encephalopathy     No past medical history on file.    No past surgical history on file.    Review of patient's allergies indicates:  No Known Allergies    No current facility-administered medications on file prior to encounter.     Current Outpatient Medications on File Prior to Encounter   Medication Sig    acetaminophen (TYLENOL) 500 MG tablet Take 500 mg by mouth every 6 (six) hours as needed for Pain.    amLODIPine (NORVASC) 10 MG tablet Take 5 mg by mouth once daily.    aspirin (ECOTRIN) 81 MG EC tablet Take 81 mg by mouth once daily.    atorvastatin (LIPITOR) 80 MG tablet Take 80 mg by mouth once daily.    empagliflozin (JARDIANCE) 25 mg tablet Take 0.5 mg by mouth every other day.    ibuprofen (ADVIL,MOTRIN) 200 MG tablet Take 200 mg by mouth every 6 (six) hours as needed for Pain.    metoprolol succinate (TOPROL-XL) 25 MG 24 hr tablet Take 25 mg by mouth once daily.    oxyCODONE-acetaminophen (PERCOCET) 5-325 mg per tablet Take 1 tablet by mouth every 6 (six) hours as needed for Pain.    phenazopyridine (PYRIDIUM) 100 MG tablet Take 100 mg by mouth 3 (three) times daily as needed for Pain.     Family History    None       Tobacco Use    Smoking status: Not on file    Smokeless tobacco: Not on file   Substance and Sexual Activity    Alcohol use: Not Currently    Drug use: Not on file    Sexual activity: Not Currently     Review of Systems   Reason unable to perform ROS: patient asleep confused.     Objective:     Vital Signs (Most Recent):  Temp: 97.9 °F (36.6 °C) (02/22/24 2335)  Pulse: 80 (02/23/24 0400)  Resp: 18 (02/22/24 2225)  BP: 135/66  (02/23/24 0400)  SpO2: (!) 92 % (02/23/24 0400) Vital Signs (24h Range):  Temp:  [97.9 °F (36.6 °C)] 97.9 °F (36.6 °C)  Pulse:  [78-98] 80  Resp:  [18] 18  SpO2:  [92 %-98 %] 92 %  BP: (113-141)/(65-80) 135/66     Weight: 77.6 kg (171 lb)  Body mass index is 27.6 kg/m².     Physical Exam  Vitals and nursing note reviewed.   Constitutional:       Appearance: Normal appearance.   HENT:      Head: Normocephalic.      Nose: Nose normal.      Mouth/Throat:      Mouth: Mucous membranes are dry.   Eyes:      Extraocular Movements: Extraocular movements intact.      Conjunctiva/sclera: Conjunctivae normal.      Comments: Pupils small    Cardiovascular:      Rate and Rhythm: Normal rate and regular rhythm.   Pulmonary:      Effort: Pulmonary effort is normal.      Breath sounds: Normal breath sounds.   Abdominal:      General: Abdomen is flat.      Palpations: Abdomen is soft.   Musculoskeletal:         General: Normal range of motion.      Cervical back: Normal range of motion.   Skin:     General: Skin is warm and dry.      Capillary Refill: Capillary refill takes less than 2 seconds.   Neurological:      General: No focal deficit present.      Comments: Oriented but somnolent. Very drowsy.                   Significant Labs: All pertinent labs within the past 24 hours have been reviewed.  CBC:   Recent Labs   Lab 02/22/24  2313   WBC 10.64   HGB 14.1   HCT 43.6        CMP:   Recent Labs   Lab 02/22/24  2313      K 3.6      CO2 21*   *   BUN 15   CREATININE 0.9   CALCIUM 8.9   PROT 7.0   ALBUMIN 3.9   BILITOT 0.6   ALKPHOS 89   AST 10   ALT 9*   ANIONGAP 9     Urine Studies:   Recent Labs   Lab 02/22/24  2257   COLORU Yellow   APPEARANCEUA Cloudy*   PHUR 6.0   SPECGRAV 1.020   PROTEINUA 2+*   GLUCUA 3+*   KETONESU 2+*   BILIRUBINUA Negative   OCCULTUA 3+*   NITRITE Negative   UROBILINOGEN Negative   LEUKOCYTESUR 1+*   RBCUA >100*   WBCUA 45*   BACTERIA Occasional   SQUAMEPITHEL 6   HYALINECASTS 2*        Significant Imaging: I have reviewed all pertinent imaging results/findings within the past 24 hours.  I have reviewed and interpreted all pertinent imaging results/findings within the past 24 hours.  Assessment/Plan:     * Encephalopathy acute    I'm not sure exactly what's wrong with him     His UA really doesn't even show UTI     I'm starting him on Cefepime which was used last time here for his Pseudomonas UTI , for now just to be safe.      But I really wonder if this is not from his Oxycodone.   Maybe he took more ??  The wife said he takes hydrocodone.    But I see oxycodone on the med rec     He is just acting sedated.      He has no neuro deficits.  Moves x 4        PLAN    - check new labs   - run IVFs  NS  - let him eat if he can swallow  - run Cefepime for now, but we can DC after few doses probably   - consult PT if he wakes up more to see how he can do standing up and walking   - fall precautions   - telemetry   - Hold off on any more narcotics       VTE Risk Mitigation (From admission, onward)      None                            Elmo Maharaj MD  Department of Hospital Medicine  [facility] - Emergency Dept

## 2024-02-23 NOTE — PLAN OF CARE
Discharge orders and chart reviewed. No other discharge needs noted at this time. Pt is clear for discharge from case management. Pt is discharging to home.    [facility]     02/23/24 8117   Final Note   Assessment Type Final Discharge Note   Anticipated Discharge Disposition Home   What phone number can be called within the next 1-3 days to see how you are doing after discharge? [phone]   Hospital Resources/Appts/Education Provided Appointment suggestion unavailable   Post-Acute Status   Discharge Delays None known at this time

## 2024-02-23 NOTE — PT/OT/SLP EVAL
"Physical Therapy Evaluation and Discharge Note    Patient Name:  Arcadio Begum   MRN:  [id number]    Recommendations:     Discharge Recommendations: No Therapy Indicated  Discharge Equipment Recommendations: none   Barriers to discharge: None    Assessment:     Arcadio Begum is a 78 y.o. male admitted with a medical diagnosis of Encephalopathy acute. Pt somewhat sleepy but oriented. Required max encouragement to ambulate with PT. Able to mobilize and ambulate without assistance. At this time, patient is functioning at their prior level of function and does not require further acute PT services.     Recent Surgery: * No surgery found *      Plan:     During this hospitalization, patient does not require further acute PT services.  Please re-consult if situation changes.      Subjective     Chief Complaint: "This is the best I've slept in days" "I think I accidentally took too much pain medicine"  Patient/Family Comments/goals: home  Pain/Comfort:  Pain Rating 1: 0/10  Pain Rating Post-Intervention 1: 0/10    Patients cultural, spiritual, Adventist conflicts given the current situation: no    Living Environment:  Lives w spouse in [place], no DANIEL, drives  Prior to admission, patients level of function was independent.  Equipment used at home: none.  DME owned (not currently used): none.  Upon discharge, patient will have assistance from spouse.    Objective:     Communicated with nurse prior to session.  Patient found HOB elevated with blood pressure cuff, peripheral IV, pulse ox (continuous), telemetry upon PT entry to room.    General Precautions: Standard, fall    Orthopedic Precautions:N/A   Braces: N/A  Respiratory Status: Room air    Exams:  Cognitive Exam:  Patient is oriented to Person, Place, Time, and Situation  Gross Motor Coordination:  WFL  RUE Strength: WFL  LUE Strength: WFL  RLE Strength: WFL  LLE Strength: WFL    Functional Mobility:  Bed Mobility:     Supine to Sit: modified independence  Sit to Supine: " modified independence  Transfers:     Sit to Stand:  modified independence with no AD  Gait: amb 125ft without AD, fair dileep, no LOB, numerous 180 degree turns  Balance: WNL    AM-PAC 6 CLICK MOBILITY  Total Score:24       Treatment and Education:  Pt educated in PT roles and goals, use of call light, fall prevention    AM-PAC 6 CLICK MOBILITY  Total Score:24     Patient left  with lunch tray in front of him  with call button in reach and nurse notified.    GOALS:   Multidisciplinary Problems       Physical Therapy Goals       Not on file                    History:     No past medical history on file.    No past surgical history on file.    Time Tracking:     PT Received On: 02/23/24  PT Start Time: 1301     PT Stop Time: 1315  PT Total Time (min): 14 min     Billable Minutes: Evaluation 14      02/23/2024

## 2024-02-24 LAB
BACTERIA UR CULT: ABNORMAL
OHS QRS DURATION: 82 MS
OHS QTC CALCULATION: 423 MS

## 2024-02-24 NOTE — HOSPITAL COURSE
Patient was monitored closely during his stay.  His mentation improved and patient was able to disclose that he recalls taking his vitamins after his procedure which included an extra oxycodone tablet after having taken 4 tabs that day-- most he has taken in one day-- with symptom onset thereafter.  He feels better today.  PT was consulted and he ambulated without issue.  He was feeling well and eager to discharge.  He and wife were counseled on safe medication storage and avoidance of accidental overuse.  He was cautiously discharged on an antibiotic for potential UTI.  I will follow culture and adjust/stop as needed- patient and family agreeable with plan.    Patient was seen on day of discharge and deemed appropriate for discharge.

## 2024-02-24 NOTE — DISCHARGE SUMMARY
[facility] - Emergency Dept  Hospital Medicine  Discharge Summary      Patient Name: Arcadio Begum  MRN: [id number]  JAYCOB: [id number]  Patient Class: OP- Observation  Admission Date: 2/22/2024  Hospital Length of Stay: 0 days  Discharge Date and Time: 2/23/2024  2:56 PM  Attending Physician: No att. providers found   Discharging Provider: Valeria Barakat NP  Primary Care Provider: [facility]    Primary Care Team: Networked reference to record PCT     HPI:   78 WM with pmh of HTN, DMII not on  insulin , Bladder Cancer currently on BCG treatment     Non smoker  Non drinker  Lives at home with wife    He is currently getting BCG infusions into bladder   .  This was his 7th treatment his wife said.    He has done fine with every treatment thus far.      He drives himself to and from his treatments .            This time on the day of admission he had his treatment and was doing fine before it and after.    He drove himself home around 1 pm.        His wife got home later and he was fine then also.    Then he took his pain medication which he has taken before and has no issues with .   He laid down on the couch and fell asleep at 5 pm.   Then at 730 he was acting confused.   He woke up but was not very alert.  And his wife came to help him get up .  But she said he had no strength and couldn't stand.    He was drowsy and confused she said.   And then he fell down once as she was trying to hold him up .   Did not hit his head.         So then she called EMS and he came to our ER    In the ER his labs were all normal   Vitals normal   WBC normal   Troponin normal       When I saw him he was sleeping mostly and had to wake up .    But he would wake up .       His UA really didn't even show a UTI .         He was here few months back with a bad UTI and the wife said he was acting same way. Confused and weak.    It ended up being pseudomonas .  And he was put on Cefepime.     But he's been fine  since.   And she said he even had a UA done few days ago to make sure no UTI present before his treatment     Head CT in ER was normal . Negative   He got IV zosyn in ER and 1 liter IVF      The wife did say he takes hydrocodone for pain but I see percocet on his home med rec        We are admitting him to figure out this new encephalopathy     * No surgery found *      Hospital Course:   Patient was monitored closely during his stay.  His mentation improved and patient was able to disclose that he recalls taking his vitamins after his procedure which included an extra oxycodone tablet after having taken 4 tabs that day-- most he has taken in one day-- with symptom onset thereafter.  He feels better today.  PT was consulted and he ambulated without issue.  He was feeling well and eager to discharge.  He and wife were counseled on safe medication storage and avoidance of accidental overuse.  He was cautiously discharged on an antibiotic for potential UTI.  I will follow culture and adjust/stop as needed- patient and family agreeable with plan.    Patient was seen on day of discharge and deemed appropriate for discharge.     Goals of Care Treatment Preferences:  Code Status: Full Code      Consults:     No new Assessment & Plan notes have been filed under this hospital service since the last note was generated.  Service: Hospital Medicine    Final Active Diagnoses:    Diagnosis Date Noted POA    PRINCIPAL PROBLEM:  Encephalopathy acute [G93.40] 02/23/2024 Yes    Hypertension [I10] 08/27/2023 Yes    CAD (coronary artery disease) [I25.10] 08/27/2023 Yes      Problems Resolved During this Admission:       Discharged Condition: good    Disposition: Home or Self Care    Follow Up:   Follow-up Information       Clinic, [facility] Follow up in 1 week(s).    Specialty: Internal Medicine  Contact information:  [address]  [address] [address] [phone]                           Patient Instructions:       Diet Adult Regular     Notify your health care provider if you experience any of the following:  temperature >100.4     Notify your health care provider if you experience any of the following:  persistent nausea and vomiting or diarrhea     Notify your health care provider if you experience any of the following:  severe uncontrolled pain     Notify your health care provider if you experience any of the following:  difficulty breathing or increased cough     Notify your health care provider if you experience any of the following:  persistent dizziness, light-headedness, or visual disturbances     Notify your health care provider if you experience any of the following:  increased confusion or weakness     Activity as tolerated       Significant Diagnostic Studies: Labs: CMP   Recent Labs   Lab 02/22/24  2313 02/23/24  0601    138   K 3.6 3.4*    110   CO2 21* 19*   * 136*   BUN 15 14   CREATININE 0.9 0.9   CALCIUM 8.9 8.7   PROT 7.0 6.7   ALBUMIN 3.9 3.6   BILITOT 0.6 0.8   ALKPHOS 89 73   AST 10 9*   ALT 9* 8*   ANIONGAP 9 9    and CBC   Recent Labs   Lab 02/22/24 2313 02/23/24  0601   WBC 10.64 9.87   HGB 14.1 13.3*   HCT 43.6 40.4    237       Pending Diagnostic Studies:       None           Medications:  Reconciled Home Medications:      Medication List        START taking these medications      cefdinir 300 MG capsule  Commonly known as: OMNICEF  Take 1 capsule (300 mg total) by mouth 2 (two) times daily. for 5 days            CONTINUE taking these medications      acetaminophen 500 MG tablet  Commonly known as: TYLENOL  Take 500 mg by mouth every 6 (six) hours as needed for Pain.     amLODIPine 10 MG tablet  Commonly known as: NORVASC  Take 5 mg by mouth once daily.     aspirin 81 MG EC tablet  Commonly known as: ECOTRIN  Take 81 mg by mouth once daily.     atorvastatin 80 MG tablet  Commonly known as: LIPITOR  Take 80 mg by mouth once daily.     empagliflozin 25 mg  tablet  Commonly known as: Jardiance  Take 12.5 mg by mouth once daily.     ibuprofen 200 MG tablet  Commonly known as: ADVIL,MOTRIN  Take 200 mg by mouth every 6 (six) hours as needed for Pain.     metoprolol succinate 25 MG 24 hr tablet  Commonly known as: TOPROL-XL  Take 25 mg by mouth once daily.     oxyCODONE-acetaminophen 5-325 mg per tablet  Commonly known as: PERCOCET  Take 1 tablet by mouth every 6 (six) hours as needed for Pain.              Indwelling Lines/Drains at time of discharge:   Lines/Drains/Airways       None                   Time spent on the discharge of patient: 34 minutes         Valeria Barakat NP  Department of Hospital Medicine  Formerly [facility] - Emergency Dept

## 2024-02-28 LAB
BACTERIA BLD CULT: NORMAL
BACTERIA BLD CULT: NORMAL

## 2024-03-07 ENCOUNTER — HOSPITAL ENCOUNTER (INPATIENT)
Facility: HOSPITAL | Age: 79
LOS: 3 days | Discharge: HOME | DRG: 872 | End: 2024-03-10
Attending: STUDENT IN AN ORGANIZED HEALTH CARE EDUCATION/TRAINING PROGRAM | Admitting: HOSPITALIST
Payer: OTHER GOVERNMENT

## 2024-03-07 DIAGNOSIS — R50.9 FEVER: ICD-10-CM

## 2024-03-07 DIAGNOSIS — R07.9 CHEST PAIN: ICD-10-CM

## 2024-03-07 DIAGNOSIS — N12 PYELONEPHRITIS: Primary | ICD-10-CM

## 2024-03-07 PROBLEM — A41.9 SEPSIS WITHOUT ACUTE ORGAN DYSFUNCTION: Status: ACTIVE | Noted: 2024-03-07

## 2024-03-07 PROBLEM — E11.9 TYPE 2 DIABETES MELLITUS WITHOUT COMPLICATION, WITHOUT LONG-TERM CURRENT USE OF INSULIN: Status: ACTIVE | Noted: 2024-03-07

## 2024-03-07 PROBLEM — C67.9 BLADDER CANCER: Status: ACTIVE | Noted: 2024-03-07

## 2024-03-07 PROBLEM — M79.605 LEFT LEG PAIN: Status: ACTIVE | Noted: 2024-03-07

## 2024-03-07 LAB
ALBUMIN SERPL BCP-MCNC: 4 G/DL (ref 3.5–5.2)
ALP SERPL-CCNC: 98 U/L (ref 55–135)
ALT SERPL W/O P-5'-P-CCNC: 10 U/L (ref 10–44)
ANION GAP SERPL CALC-SCNC: 10 MMOL/L (ref 8–16)
AST SERPL-CCNC: 10 U/L (ref 10–40)
BACTERIA #/AREA URNS HPF: ABNORMAL /HPF
BASOPHILS # BLD AUTO: 0.09 K/UL (ref 0–0.2)
BASOPHILS NFR BLD: 0.6 % (ref 0–1.9)
BILIRUB SERPL-MCNC: 0.8 MG/DL (ref 0.1–1)
BILIRUB UR QL STRIP: NEGATIVE
BUN SERPL-MCNC: 19 MG/DL (ref 8–23)
CALCIUM SERPL-MCNC: 9.1 MG/DL (ref 8.7–10.5)
CHLORIDE SERPL-SCNC: 107 MMOL/L (ref 95–110)
CLARITY UR: ABNORMAL
CO2 SERPL-SCNC: 19 MMOL/L (ref 23–29)
COLOR UR: ABNORMAL
CREAT SERPL-MCNC: 1 MG/DL (ref 0.5–1.4)
DIFFERENTIAL METHOD BLD: ABNORMAL
EOSINOPHIL # BLD AUTO: 0.1 K/UL (ref 0–0.5)
EOSINOPHIL NFR BLD: 0.4 % (ref 0–8)
ERYTHROCYTE [DISTWIDTH] IN BLOOD BY AUTOMATED COUNT: 14 % (ref 11.5–14.5)
EST. GFR  (NO RACE VARIABLE): >60 ML/MIN/1.73 M^2
GLUCOSE SERPL-MCNC: 116 MG/DL (ref 70–110)
GLUCOSE UR QL STRIP: ABNORMAL
HCT VFR BLD AUTO: 46.1 % (ref 40–54)
HGB BLD-MCNC: 15.2 G/DL (ref 14–18)
HGB UR QL STRIP: ABNORMAL
HYALINE CASTS #/AREA URNS LPF: 1 /LPF
IMM GRANULOCYTES # BLD AUTO: 0.06 K/UL (ref 0–0.04)
IMM GRANULOCYTES NFR BLD AUTO: 0.4 % (ref 0–0.5)
INFLUENZA A, MOLECULAR: NEGATIVE
INFLUENZA B, MOLECULAR: NEGATIVE
KETONES UR QL STRIP: NEGATIVE
LACTATE SERPL-SCNC: 1 MMOL/L (ref 0.5–1.9)
LEUKOCYTE ESTERASE UR QL STRIP: ABNORMAL
LIPASE SERPL-CCNC: 6 U/L (ref 4–60)
LYMPHOCYTES # BLD AUTO: 0.6 K/UL (ref 1–4.8)
LYMPHOCYTES NFR BLD: 3.9 % (ref 18–48)
MCH RBC QN AUTO: 28.1 PG (ref 27–31)
MCHC RBC AUTO-ENTMCNC: 33 G/DL (ref 32–36)
MCV RBC AUTO: 85 FL (ref 82–98)
MICROSCOPIC COMMENT: ABNORMAL
MONOCYTES # BLD AUTO: 0.8 K/UL (ref 0.3–1)
MONOCYTES NFR BLD: 5.4 % (ref 4–15)
NEUTROPHILS # BLD AUTO: 13 K/UL (ref 1.8–7.7)
NEUTROPHILS NFR BLD: 89.3 % (ref 38–73)
NITRITE UR QL STRIP: NEGATIVE
NRBC BLD-RTO: 0 /100 WBC
PH UR STRIP: 7 [PH] (ref 5–8)
PLATELET # BLD AUTO: 334 K/UL (ref 150–450)
PMV BLD AUTO: 10.3 FL (ref 9.2–12.9)
POTASSIUM SERPL-SCNC: 3.8 MMOL/L (ref 3.5–5.1)
PROCALCITONIN SERPL IA-MCNC: 0.33 NG/ML (ref 0–0.5)
PROT SERPL-MCNC: 7.4 G/DL (ref 6–8.4)
PROT UR QL STRIP: ABNORMAL
RBC # BLD AUTO: 5.41 M/UL (ref 4.6–6.2)
RBC #/AREA URNS HPF: >100 /HPF (ref 0–4)
SARS-COV-2 RDRP RESP QL NAA+PROBE: NEGATIVE
SODIUM SERPL-SCNC: 136 MMOL/L (ref 136–145)
SP GR UR STRIP: >1.03 (ref 1–1.03)
SPECIMEN SOURCE: NORMAL
SQUAMOUS #/AREA URNS HPF: 4 /HPF
URN SPEC COLLECT METH UR: ABNORMAL
UROBILINOGEN UR STRIP-ACNC: NEGATIVE EU/DL
WBC # BLD AUTO: 14.58 K/UL (ref 3.9–12.7)
WBC #/AREA URNS HPF: >100 /HPF (ref 0–5)
WBC CLUMPS URNS QL MICRO: ABNORMAL
YEAST URNS QL MICRO: ABNORMAL

## 2024-03-07 PROCEDURE — 99285 EMERGENCY DEPT VISIT HI MDM: CPT | Mod: 25

## 2024-03-07 PROCEDURE — 12000002 HC ACUTE/MED SURGE SEMI-PRIVATE ROOM

## 2024-03-07 PROCEDURE — 36415 COLL VENOUS BLD VENIPUNCTURE: CPT | Performed by: NURSE PRACTITIONER

## 2024-03-07 PROCEDURE — 87086 URINE CULTURE/COLONY COUNT: CPT | Performed by: NURSE PRACTITIONER

## 2024-03-07 PROCEDURE — 63600175 PHARM REV CODE 636 W HCPCS: Performed by: STUDENT IN AN ORGANIZED HEALTH CARE EDUCATION/TRAINING PROGRAM

## 2024-03-07 PROCEDURE — 96361 HYDRATE IV INFUSION ADD-ON: CPT

## 2024-03-07 PROCEDURE — 93010 ELECTROCARDIOGRAM REPORT: CPT | Mod: ,,, | Performed by: INTERNAL MEDICINE

## 2024-03-07 PROCEDURE — 83605 ASSAY OF LACTIC ACID: CPT | Performed by: NURSE PRACTITIONER

## 2024-03-07 PROCEDURE — 83690 ASSAY OF LIPASE: CPT | Performed by: NURSE PRACTITIONER

## 2024-03-07 PROCEDURE — 87040 BLOOD CULTURE FOR BACTERIA: CPT | Performed by: NURSE PRACTITIONER

## 2024-03-07 PROCEDURE — 85025 COMPLETE CBC W/AUTO DIFF WBC: CPT | Performed by: NURSE PRACTITIONER

## 2024-03-07 PROCEDURE — 80053 COMPREHEN METABOLIC PANEL: CPT | Performed by: NURSE PRACTITIONER

## 2024-03-07 PROCEDURE — 25000003 PHARM REV CODE 250: Performed by: STUDENT IN AN ORGANIZED HEALTH CARE EDUCATION/TRAINING PROGRAM

## 2024-03-07 PROCEDURE — 25500020 PHARM REV CODE 255: Performed by: STUDENT IN AN ORGANIZED HEALTH CARE EDUCATION/TRAINING PROGRAM

## 2024-03-07 PROCEDURE — 96365 THER/PROPH/DIAG IV INF INIT: CPT

## 2024-03-07 PROCEDURE — U0002 COVID-19 LAB TEST NON-CDC: HCPCS | Performed by: STUDENT IN AN ORGANIZED HEALTH CARE EDUCATION/TRAINING PROGRAM

## 2024-03-07 PROCEDURE — 93005 ELECTROCARDIOGRAM TRACING: CPT | Performed by: INTERNAL MEDICINE

## 2024-03-07 PROCEDURE — 84145 PROCALCITONIN (PCT): CPT | Performed by: STUDENT IN AN ORGANIZED HEALTH CARE EDUCATION/TRAINING PROGRAM

## 2024-03-07 PROCEDURE — 87502 INFLUENZA DNA AMP PROBE: CPT | Performed by: STUDENT IN AN ORGANIZED HEALTH CARE EDUCATION/TRAINING PROGRAM

## 2024-03-07 PROCEDURE — 81001 URINALYSIS AUTO W/SCOPE: CPT | Performed by: NURSE PRACTITIONER

## 2024-03-07 RX ORDER — ASPIRIN 81 MG/1
81 TABLET ORAL DAILY
Status: DISCONTINUED | OUTPATIENT
Start: 2024-03-08 | End: 2024-03-10 | Stop reason: HOSPADM

## 2024-03-07 RX ORDER — IBUPROFEN 200 MG
16 TABLET ORAL
Status: DISCONTINUED | OUTPATIENT
Start: 2024-03-08 | End: 2024-03-08

## 2024-03-07 RX ORDER — ALUMINUM HYDROXIDE, MAGNESIUM HYDROXIDE, AND SIMETHICONE 1200; 120; 1200 MG/30ML; MG/30ML; MG/30ML
30 SUSPENSION ORAL 4 TIMES DAILY PRN
Status: DISCONTINUED | OUTPATIENT
Start: 2024-03-08 | End: 2024-03-10 | Stop reason: HOSPADM

## 2024-03-07 RX ORDER — SODIUM CHLORIDE 0.9 % (FLUSH) 0.9 %
10 SYRINGE (ML) INJECTION
Status: DISCONTINUED | OUTPATIENT
Start: 2024-03-08 | End: 2024-03-10 | Stop reason: HOSPADM

## 2024-03-07 RX ORDER — NALOXONE HCL 0.4 MG/ML
0.02 VIAL (ML) INJECTION
Status: DISCONTINUED | OUTPATIENT
Start: 2024-03-08 | End: 2024-03-10 | Stop reason: HOSPADM

## 2024-03-07 RX ORDER — SODIUM,POTASSIUM PHOSPHATES 280-250MG
2 POWDER IN PACKET (EA) ORAL
Status: DISCONTINUED | OUTPATIENT
Start: 2024-03-08 | End: 2024-03-10 | Stop reason: HOSPADM

## 2024-03-07 RX ORDER — IBUPROFEN 200 MG
24 TABLET ORAL
Status: DISCONTINUED | OUTPATIENT
Start: 2024-03-08 | End: 2024-03-08

## 2024-03-07 RX ORDER — ACETAMINOPHEN 325 MG/1
650 TABLET ORAL EVERY 8 HOURS PRN
Status: DISCONTINUED | OUTPATIENT
Start: 2024-03-08 | End: 2024-03-08

## 2024-03-07 RX ORDER — LANOLIN ALCOHOL/MO/W.PET/CERES
800 CREAM (GRAM) TOPICAL
Status: DISCONTINUED | OUTPATIENT
Start: 2024-03-08 | End: 2024-03-10 | Stop reason: HOSPADM

## 2024-03-07 RX ORDER — TALC
6 POWDER (GRAM) TOPICAL NIGHTLY PRN
Status: DISCONTINUED | OUTPATIENT
Start: 2024-03-08 | End: 2024-03-10 | Stop reason: HOSPADM

## 2024-03-07 RX ORDER — ACETAMINOPHEN 325 MG/1
650 TABLET ORAL EVERY 4 HOURS PRN
Status: DISCONTINUED | OUTPATIENT
Start: 2024-03-08 | End: 2024-03-10 | Stop reason: HOSPADM

## 2024-03-07 RX ORDER — ENOXAPARIN SODIUM 100 MG/ML
40 INJECTION SUBCUTANEOUS EVERY 24 HOURS
Status: DISCONTINUED | OUTPATIENT
Start: 2024-03-08 | End: 2024-03-10 | Stop reason: HOSPADM

## 2024-03-07 RX ORDER — OXYCODONE AND ACETAMINOPHEN 5; 325 MG/1; MG/1
1 TABLET ORAL EVERY 6 HOURS PRN
Status: DISCONTINUED | OUTPATIENT
Start: 2024-03-08 | End: 2024-03-10 | Stop reason: HOSPADM

## 2024-03-07 RX ORDER — OXYCODONE AND ACETAMINOPHEN 5; 325 MG/1; MG/1
1 TABLET ORAL
Status: COMPLETED | OUTPATIENT
Start: 2024-03-07 | End: 2024-03-07

## 2024-03-07 RX ORDER — HYDROCODONE BITARTRATE AND ACETAMINOPHEN 5; 325 MG/1; MG/1
1 TABLET ORAL EVERY 6 HOURS PRN
Status: DISCONTINUED | OUTPATIENT
Start: 2024-03-08 | End: 2024-03-08

## 2024-03-07 RX ORDER — GLUCAGON 1 MG
1 KIT INJECTION
Status: DISCONTINUED | OUTPATIENT
Start: 2024-03-08 | End: 2024-03-08

## 2024-03-07 RX ORDER — METOPROLOL SUCCINATE 25 MG/1
25 TABLET, EXTENDED RELEASE ORAL DAILY
Status: DISCONTINUED | OUTPATIENT
Start: 2024-03-08 | End: 2024-03-10 | Stop reason: HOSPADM

## 2024-03-07 RX ORDER — ONDANSETRON HYDROCHLORIDE 2 MG/ML
4 INJECTION, SOLUTION INTRAVENOUS EVERY 6 HOURS PRN
Status: DISCONTINUED | OUTPATIENT
Start: 2024-03-08 | End: 2024-03-10 | Stop reason: HOSPADM

## 2024-03-07 RX ORDER — AMOXICILLIN 250 MG
1 CAPSULE ORAL 2 TIMES DAILY
Status: DISCONTINUED | OUTPATIENT
Start: 2024-03-08 | End: 2024-03-10 | Stop reason: HOSPADM

## 2024-03-07 RX ADMIN — PIPERACILLIN AND TAZOBACTAM 4.5 G: 4; .5 INJECTION, POWDER, LYOPHILIZED, FOR SOLUTION INTRAVENOUS at 09:03

## 2024-03-07 RX ADMIN — SODIUM CHLORIDE, SODIUM LACTATE, POTASSIUM CHLORIDE, AND CALCIUM CHLORIDE 1000 ML: .6; .31; .03; .02 INJECTION, SOLUTION INTRAVENOUS at 11:03

## 2024-03-07 RX ADMIN — OXYCODONE HYDROCHLORIDE AND ACETAMINOPHEN 1 TABLET: 5; 325 TABLET ORAL at 11:03

## 2024-03-07 RX ADMIN — IOHEXOL 100 ML: 300 INJECTION, SOLUTION INTRAVENOUS at 10:03

## 2024-03-08 PROBLEM — I10 PRIMARY HYPERTENSION: Status: ACTIVE | Noted: 2024-03-08

## 2024-03-08 PROBLEM — M79.605 LEFT LEG PAIN: Status: RESOLVED | Noted: 2024-03-07 | Resolved: 2024-03-08

## 2024-03-08 LAB
ALBUMIN SERPL BCP-MCNC: 3.5 G/DL (ref 3.5–5.2)
ALP SERPL-CCNC: 82 U/L (ref 55–135)
ALT SERPL W/O P-5'-P-CCNC: 8 U/L (ref 10–44)
ANION GAP SERPL CALC-SCNC: 7 MMOL/L (ref 8–16)
AST SERPL-CCNC: 9 U/L (ref 10–40)
BASOPHILS # BLD AUTO: 0.06 K/UL (ref 0–0.2)
BASOPHILS NFR BLD: 0.4 % (ref 0–1.9)
BILIRUB SERPL-MCNC: 0.8 MG/DL (ref 0.1–1)
BUN SERPL-MCNC: 17 MG/DL (ref 8–23)
CALCIUM SERPL-MCNC: 8.7 MG/DL (ref 8.7–10.5)
CHLORIDE SERPL-SCNC: 107 MMOL/L (ref 95–110)
CK SERPL-CCNC: 26 U/L (ref 20–200)
CO2 SERPL-SCNC: 21 MMOL/L (ref 23–29)
CREAT SERPL-MCNC: 1 MG/DL (ref 0.5–1.4)
CRP SERPL-MCNC: 158 MG/L (ref 0–8.2)
DIFFERENTIAL METHOD BLD: ABNORMAL
EOSINOPHIL # BLD AUTO: 0.2 K/UL (ref 0–0.5)
EOSINOPHIL NFR BLD: 1.1 % (ref 0–8)
ERYTHROCYTE [DISTWIDTH] IN BLOOD BY AUTOMATED COUNT: 14.1 % (ref 11.5–14.5)
EST. GFR  (NO RACE VARIABLE): >60 ML/MIN/1.73 M^2
ESTIMATED AVG GLUCOSE: 197 MG/DL (ref 68–131)
GLUCOSE SERPL-MCNC: 103 MG/DL (ref 70–110)
GLUCOSE SERPL-MCNC: 124 MG/DL (ref 70–110)
GLUCOSE SERPL-MCNC: 133 MG/DL (ref 70–110)
GLUCOSE SERPL-MCNC: 196 MG/DL (ref 70–110)
HBA1C MFR BLD: 8.5 % (ref 4.5–6.2)
HCT VFR BLD AUTO: 41.5 % (ref 40–54)
HGB BLD-MCNC: 13.4 G/DL (ref 14–18)
IMM GRANULOCYTES # BLD AUTO: 0.05 K/UL (ref 0–0.04)
IMM GRANULOCYTES NFR BLD AUTO: 0.4 % (ref 0–0.5)
LACTATE SERPL-SCNC: 0.9 MMOL/L (ref 0.5–1.9)
LYMPHOCYTES # BLD AUTO: 0.9 K/UL (ref 1–4.8)
LYMPHOCYTES NFR BLD: 6.3 % (ref 18–48)
MAGNESIUM SERPL-MCNC: 1.8 MG/DL (ref 1.6–2.6)
MCH RBC QN AUTO: 27.3 PG (ref 27–31)
MCHC RBC AUTO-ENTMCNC: 32.3 G/DL (ref 32–36)
MCV RBC AUTO: 85 FL (ref 82–98)
MONOCYTES # BLD AUTO: 1 K/UL (ref 0.3–1)
MONOCYTES NFR BLD: 7.3 % (ref 4–15)
MRSA SCREEN BY PCR: NEGATIVE
NEUTROPHILS # BLD AUTO: 11.8 K/UL (ref 1.8–7.7)
NEUTROPHILS NFR BLD: 84.5 % (ref 38–73)
NRBC BLD-RTO: 0 /100 WBC
PLATELET # BLD AUTO: 297 K/UL (ref 150–450)
PMV BLD AUTO: 10.5 FL (ref 9.2–12.9)
POTASSIUM SERPL-SCNC: 3.8 MMOL/L (ref 3.5–5.1)
PROCALCITONIN SERPL IA-MCNC: 0.42 NG/ML (ref 0–0.5)
PROT SERPL-MCNC: 6.4 G/DL (ref 6–8.4)
RBC # BLD AUTO: 4.91 M/UL (ref 4.6–6.2)
SODIUM SERPL-SCNC: 135 MMOL/L (ref 136–145)
WBC # BLD AUTO: 13.92 K/UL (ref 3.9–12.7)

## 2024-03-08 PROCEDURE — 25000003 PHARM REV CODE 250: Performed by: HOSPITALIST

## 2024-03-08 PROCEDURE — 84145 PROCALCITONIN (PCT): CPT | Performed by: HOSPITALIST

## 2024-03-08 PROCEDURE — 63600175 PHARM REV CODE 636 W HCPCS: Performed by: STUDENT IN AN ORGANIZED HEALTH CARE EDUCATION/TRAINING PROGRAM

## 2024-03-08 PROCEDURE — 25000003 PHARM REV CODE 250: Performed by: NURSE PRACTITIONER

## 2024-03-08 PROCEDURE — 83036 HEMOGLOBIN GLYCOSYLATED A1C: CPT | Performed by: HOSPITALIST

## 2024-03-08 PROCEDURE — 83605 ASSAY OF LACTIC ACID: CPT | Performed by: NURSE PRACTITIONER

## 2024-03-08 PROCEDURE — 12000002 HC ACUTE/MED SURGE SEMI-PRIVATE ROOM

## 2024-03-08 PROCEDURE — 87206 SMEAR FLUORESCENT/ACID STAI: CPT | Performed by: NURSE PRACTITIONER

## 2024-03-08 PROCEDURE — 87116 MYCOBACTERIA CULTURE: CPT | Performed by: NURSE PRACTITIONER

## 2024-03-08 PROCEDURE — 87641 MR-STAPH DNA AMP PROBE: CPT | Performed by: NURSE PRACTITIONER

## 2024-03-08 PROCEDURE — 63600175 PHARM REV CODE 636 W HCPCS: Performed by: HOSPITALIST

## 2024-03-08 PROCEDURE — 83735 ASSAY OF MAGNESIUM: CPT | Performed by: HOSPITALIST

## 2024-03-08 PROCEDURE — 36415 COLL VENOUS BLD VENIPUNCTURE: CPT | Performed by: HOSPITALIST

## 2024-03-08 PROCEDURE — 86140 C-REACTIVE PROTEIN: CPT | Performed by: HOSPITALIST

## 2024-03-08 PROCEDURE — 25000003 PHARM REV CODE 250: Performed by: STUDENT IN AN ORGANIZED HEALTH CARE EDUCATION/TRAINING PROGRAM

## 2024-03-08 PROCEDURE — 85025 COMPLETE CBC W/AUTO DIFF WBC: CPT | Performed by: HOSPITALIST

## 2024-03-08 PROCEDURE — 99223 1ST HOSP IP/OBS HIGH 75: CPT | Mod: ,,, | Performed by: NURSE PRACTITIONER

## 2024-03-08 PROCEDURE — 82550 ASSAY OF CK (CPK): CPT | Performed by: HOSPITALIST

## 2024-03-08 PROCEDURE — 80053 COMPREHEN METABOLIC PANEL: CPT | Performed by: HOSPITALIST

## 2024-03-08 RX ORDER — IBUPROFEN 200 MG
24 TABLET ORAL
Status: DISCONTINUED | OUTPATIENT
Start: 2024-03-08 | End: 2024-03-10 | Stop reason: HOSPADM

## 2024-03-08 RX ORDER — AMLODIPINE BESYLATE 5 MG/1
5 TABLET ORAL DAILY
Status: DISCONTINUED | OUTPATIENT
Start: 2024-03-09 | End: 2024-03-10 | Stop reason: HOSPADM

## 2024-03-08 RX ORDER — IBUPROFEN 200 MG
16 TABLET ORAL
Status: DISCONTINUED | OUTPATIENT
Start: 2024-03-08 | End: 2024-03-10 | Stop reason: HOSPADM

## 2024-03-08 RX ORDER — INSULIN ASPART 100 [IU]/ML
0-5 INJECTION, SOLUTION INTRAVENOUS; SUBCUTANEOUS
Status: DISCONTINUED | OUTPATIENT
Start: 2024-03-08 | End: 2024-03-10 | Stop reason: HOSPADM

## 2024-03-08 RX ORDER — GLUCAGON 1 MG
1 KIT INJECTION
Status: DISCONTINUED | OUTPATIENT
Start: 2024-03-08 | End: 2024-03-10 | Stop reason: HOSPADM

## 2024-03-08 RX ORDER — SODIUM CHLORIDE 9 MG/ML
INJECTION, SOLUTION INTRAVENOUS CONTINUOUS
Status: DISCONTINUED | OUTPATIENT
Start: 2024-03-08 | End: 2024-03-09

## 2024-03-08 RX ORDER — ATORVASTATIN CALCIUM 40 MG/1
80 TABLET, FILM COATED ORAL NIGHTLY
Status: DISCONTINUED | OUTPATIENT
Start: 2024-03-08 | End: 2024-03-10 | Stop reason: HOSPADM

## 2024-03-08 RX ADMIN — ALUMINUM HYDROXIDE, MAGNESIUM HYDROXIDE, AND SIMETHICONE 30 ML: 1200; 120; 1200 SUSPENSION ORAL at 12:03

## 2024-03-08 RX ADMIN — OXYCODONE HYDROCHLORIDE AND ACETAMINOPHEN 1 TABLET: 5; 325 TABLET ORAL at 06:03

## 2024-03-08 RX ADMIN — HYDROCODONE BITARTRATE AND ACETAMINOPHEN 1 TABLET: 5; 325 TABLET ORAL at 07:03

## 2024-03-08 RX ADMIN — ENOXAPARIN SODIUM 40 MG: 100 INJECTION SUBCUTANEOUS at 04:03

## 2024-03-08 RX ADMIN — METOPROLOL SUCCINATE 25 MG: 25 TABLET, EXTENDED RELEASE ORAL at 11:03

## 2024-03-08 RX ADMIN — ATORVASTATIN CALCIUM 80 MG: 40 TABLET, FILM COATED ORAL at 09:03

## 2024-03-08 RX ADMIN — SODIUM CHLORIDE: 9 INJECTION, SOLUTION INTRAVENOUS at 02:03

## 2024-03-08 RX ADMIN — ALUMINUM HYDROXIDE, MAGNESIUM HYDROXIDE, AND SIMETHICONE 30 ML: 1200; 120; 1200 SUSPENSION ORAL at 05:03

## 2024-03-08 RX ADMIN — PIPERACILLIN SODIUM AND TAZOBACTAM SODIUM 3.38 G: 3; .375 INJECTION, POWDER, LYOPHILIZED, FOR SOLUTION INTRAVENOUS at 03:03

## 2024-03-08 RX ADMIN — PIPERACILLIN SODIUM AND TAZOBACTAM SODIUM 3.38 G: 3; .375 INJECTION, POWDER, LYOPHILIZED, FOR SOLUTION INTRAVENOUS at 06:03

## 2024-03-08 RX ADMIN — PIPERACILLIN SODIUM AND TAZOBACTAM SODIUM 3.38 G: 3; .375 INJECTION, POWDER, LYOPHILIZED, FOR SOLUTION INTRAVENOUS at 09:03

## 2024-03-08 RX ADMIN — VANCOMYCIN HYDROCHLORIDE 2000 MG: 500 INJECTION, POWDER, LYOPHILIZED, FOR SOLUTION INTRAVENOUS at 12:03

## 2024-03-08 RX ADMIN — OXYCODONE HYDROCHLORIDE AND ACETAMINOPHEN 1 TABLET: 5; 325 TABLET ORAL at 12:03

## 2024-03-08 RX ADMIN — Medication 4 TABLET: at 04:03

## 2024-03-08 RX ADMIN — ASPIRIN 81 MG: 81 TABLET, COATED ORAL at 11:03

## 2024-03-08 NOTE — ASSESSMENT & PLAN NOTE
Chronic, controlled.  Latest blood pressure and vitals reviewed-     Temp:  [97.6 °F (36.4 °C)-98.5 °F (36.9 °C)]   Pulse:  [60-90]   Resp:  [16-26]   BP: (104-119)/(57-76)   SpO2:  [94 %-99 %] .   Home meds for hypertension were reviewed and noted below-  Hypertension Medications               amLODIPine (NORVASC) 10 MG tablet Take 5 mg by mouth once daily.    metoprolol succinate (TOPROL-XL) 25 MG 24 hr tablet Take 25 mg by mouth once daily.            While in the hospital, will manage blood pressure as follows; Continue home antihypertensive regimen    Will utilize p.r.n. blood pressure medication only if patient's blood pressure greater than 180/110 and he develops symptoms such as worsening chest pain or shortness of breath.

## 2024-03-08 NOTE — HPI
79 yo male with PMH of bladder cancer on BCG treatment, last of which was today, who presented with generalized weakness and fever. Per pt, he got home today after the BCG treatment and started to have generalized weakness and fatigue. Soon after, he developed a fever of 101.2. Wife gave pt 1000 mg of tylenol [facility] and [facility] recommended to go to the ER for eval. Pt has dysuria which is normal to him after getting BCG treatment per pt. He also usually has frequency and urgency. ROS is positive for left flank pain for 2 week.     On arrival to the ER, pt was afebrile, with bp of 113/75, and HR of 75. Labs showed leukocytosis of 14.58. CMP, lactic acid and procal are wnl. Covid/flu negative. CT Chest/abdomen/pelvis showed Mild left perinephric stranding, heterogenous parenchymal enhancement of the kidney, mild hydronephrosis and hydroureter with periureteral fat stranding extending to the bladder and without obstructing ureteral stone. Findings are suspicious for pyelonephritis and pyelitis. Also findings suspicious for cystitis. Urine and blood cx collected. Pt was given zosyn and vanco and will be admitted to medicine for further care.

## 2024-03-08 NOTE — SUBJECTIVE & OBJECTIVE
PMH: Bladder cancer, DM2, HTN, CAD    PSH; Hernia repair, bladder cancer bx.    Fhx; heart disease    Social history; drink occasionally, doesn't smoke or use any drugs.     Review of patient's allergies indicates:  No Known Allergies    No current facility-administered medications on file prior to encounter.     Current Outpatient Medications on File Prior to Encounter   Medication Sig    acetaminophen (TYLENOL) 500 MG tablet Take 500 mg by mouth every 6 (six) hours as needed for Pain.    amLODIPine (NORVASC) 10 MG tablet Take 5 mg by mouth once daily.    aspirin (ECOTRIN) 81 MG EC tablet Take 81 mg by mouth once daily.    atorvastatin (LIPITOR) 80 MG tablet Take 80 mg by mouth once daily.    empagliflozin (JARDIANCE) 25 mg tablet Take 12.5 mg by mouth once daily.    ibuprofen (ADVIL,MOTRIN) 200 MG tablet Take 200 mg by mouth every 6 (six) hours as needed for Pain.    metoprolol succinate (TOPROL-XL) 25 MG 24 hr tablet Take 25 mg by mouth once daily.    oxyCODONE-acetaminophen (PERCOCET) 5-325 mg per tablet Take 1 tablet by mouth every 6 (six) hours as needed for Pain.           Review of Systems   Constitutional:  Positive for activity change, chills and fatigue.   HENT:  Negative for sore throat.    Eyes:  Negative for visual disturbance.   Respiratory:  Negative for cough and shortness of breath.    Cardiovascular:  Negative for chest pain, palpitations and leg swelling.   Gastrointestinal:  Negative for abdominal distention, abdominal pain, nausea and vomiting.   Endocrine: Negative for cold intolerance and heat intolerance.   Genitourinary:  Positive for dysuria, flank pain, frequency and urgency.   Musculoskeletal:  Positive for back pain and myalgias.   Skin:  Negative for rash.        Left leg pain.    Neurological:  Negative for syncope and headaches.   Psychiatric/Behavioral:  Negative for confusion and hallucinations.      Objective:     Vital Signs (Most Recent):  Temp: 98.5 °F (36.9 °C) (03/07/24  2011)  Pulse: 72 (03/07/24 2213)  Resp: (!) 26 (03/07/24 2213)  BP: 116/65 (03/07/24 2213)  SpO2: 95 % (03/07/24 2213) Vital Signs (24h Range):  Temp:  [98.5 °F (36.9 °C)] 98.5 °F (36.9 °C)  Pulse:  [72-90] 72  Resp:  [16-26] 26  SpO2:  [94 %-95 %] 95 %  BP: (113-116)/(65-75) 116/65     Weight: 86.6 kg (191 lb)  Body mass index is 30.83 kg/m².     Physical Exam  Vitals reviewed.   Constitutional:       Appearance: He is ill-appearing.   HENT:      Head: Normocephalic and atraumatic.      Mouth/Throat:      Mouth: Mucous membranes are dry.   Eyes:      Extraocular Movements: Extraocular movements intact.      Conjunctiva/sclera: Conjunctivae normal.      Pupils: Pupils are equal, round, and reactive to light.   Cardiovascular:      Rate and Rhythm: Normal rate and regular rhythm.   Pulmonary:      Effort: Pulmonary effort is normal. No respiratory distress.      Breath sounds: Normal breath sounds. No wheezing.   Abdominal:      General: Abdomen is flat. Bowel sounds are normal. There is no distension.      Palpations: Abdomen is soft.   Genitourinary:     Comments: Left CVA tenderness.   Musculoskeletal:         General: No swelling or tenderness. Normal range of motion.      Cervical back: Normal range of motion and neck supple.   Skin:     General: Skin is warm.   Neurological:      General: No focal deficit present.      Mental Status: He is alert and oriented to person, place, and time.   Psychiatric:         Mood and Affect: Mood normal.         Behavior: Behavior normal.              CRANIAL NERVES     CN III, IV, VI   Pupils are equal, round, and reactive to light.       Significant Labs: All pertinent labs within the past 24 hours have been reviewed.  CBC:   Recent Labs   Lab 03/07/24 2040   WBC 14.58*   HGB 15.2   HCT 46.1        CMP:   Recent Labs   Lab 03/07/24 2040      K 3.8      CO2 19*   *   BUN 19   CREATININE 1.0   CALCIUM 9.1   PROT 7.4   ALBUMIN 4.0   BILITOT 0.8  "  ALKPHOS 98   AST 10   ALT 10   ANIONGAP 10     Coagulation: No results for input(s): "PT", "INR", "APTT" in the last 48 hours.  Lactic Acid:   Recent Labs   Lab 03/07/24 2040   LACTATE 1.0     Urine Culture: No results for input(s): "LABURIN" in the last 48 hours.  Urine Studies: No results for input(s): "COLORU", "APPEARANCEUA", "PHUR", "SPECGRAV", "PROTEINUA", "GLUCUA", "KETONESU", "BILIRUBINUA", "OCCULTUA", "NITRITE", "UROBILINOGEN", "LEUKOCYTESUR", "RBCUA", "WBCUA", "BACTERIA", "SQUAMEPITHEL", "HYALINECASTS" in the last 48 hours.    Invalid input(s): "WRIGHTSUR"    Significant Imaging: I have reviewed all pertinent imaging results/findings within the past 24 hours.  "

## 2024-03-08 NOTE — SUBJECTIVE & OBJECTIVE
Interval History:  Notes reviewed, no acute events overnight.  Patient resting comfortably in bed.  He states he feels better today.  Dysuria and flank pain have improved.  Continue antibiotic therapy and monitor closely and await cultures.    Review of Systems   Constitutional:  Positive for activity change, chills and fatigue.   HENT:  Negative for sore throat.    Eyes:  Negative for visual disturbance.   Respiratory:  Negative for cough and shortness of breath.    Cardiovascular:  Negative for chest pain, palpitations and leg swelling.   Gastrointestinal:  Negative for abdominal distention, abdominal pain, nausea and vomiting.   Endocrine: Negative for cold intolerance and heat intolerance.   Genitourinary:  Positive for dysuria and flank pain. Negative for frequency and urgency.   Musculoskeletal:  Positive for back pain and myalgias.   Skin:  Negative for rash.        Left leg pain.    Neurological:  Negative for syncope and headaches.   Psychiatric/Behavioral:  Negative for confusion and hallucinations.    All other systems reviewed and are negative.    Objective:     Vital Signs (Most Recent):  Temp: 97.6 °F (36.4 °C) (03/08/24 1633)  Pulse: 60 (03/08/24 1633)  Resp: 18 (03/08/24 1633)  BP: 114/67 (03/08/24 1633)  SpO2: 96 % (03/08/24 1633) Vital Signs (24h Range):  Temp:  [97.6 °F (36.4 °C)-98.5 °F (36.9 °C)] 97.6 °F (36.4 °C)  Pulse:  [60-90] 60  Resp:  [16-26] 18  SpO2:  [94 %-99 %] 96 %  BP: (104-119)/(57-76) 114/67     Weight: 86.6 kg (191 lb)  Body mass index is 30.83 kg/m².    Intake/Output Summary (Last 24 hours) at 3/8/2024 1650  Last data filed at 3/8/2024 1114  Gross per 24 hour   Intake 500 ml   Output 1550 ml   Net -1050 ml         Physical Exam  Vitals and nursing note reviewed.   Constitutional:       General: He is not in acute distress.     Appearance: Normal appearance. He is well-developed. He is not ill-appearing or diaphoretic.   HENT:      Head: Normocephalic and atraumatic.      Right  Ear: External ear normal.      Left Ear: External ear normal.      Nose: Nose normal. No congestion or rhinorrhea.      Mouth/Throat:      Mouth: Mucous membranes are dry.      Pharynx: Oropharynx is clear. No oropharyngeal exudate or posterior oropharyngeal erythema.   Eyes:      General: No scleral icterus.     Extraocular Movements: Extraocular movements intact.      Conjunctiva/sclera: Conjunctivae normal.      Pupils: Pupils are equal, round, and reactive to light.   Neck:      Vascular: No JVD.   Cardiovascular:      Rate and Rhythm: Normal rate and regular rhythm.      Pulses: Normal pulses.      Heart sounds: Normal heart sounds. No murmur heard.  Pulmonary:      Effort: Pulmonary effort is normal. No respiratory distress.      Breath sounds: Normal breath sounds. No stridor. No wheezing, rhonchi or rales.   Abdominal:      General: Abdomen is flat. Bowel sounds are normal. There is no distension.      Palpations: Abdomen is soft.      Tenderness: There is no abdominal tenderness.   Genitourinary:     Comments: Left CVA tenderness.   Musculoskeletal:         General: No swelling or tenderness. Normal range of motion.      Cervical back: Normal range of motion and neck supple.   Skin:     General: Skin is warm and dry.      Capillary Refill: Capillary refill takes 2 to 3 seconds.      Coloration: Skin is not jaundiced or pale.      Findings: No erythema.   Neurological:      General: No focal deficit present.      Mental Status: He is alert and oriented to person, place, and time.      Cranial Nerves: No cranial nerve deficit.      Sensory: No sensory deficit.   Psychiatric:         Mood and Affect: Mood normal.         Behavior: Behavior normal.         Thought Content: Thought content normal.             Significant Labs: All pertinent labs within the past 24 hours have been reviewed.  CBC:   Recent Labs   Lab 03/07/24  2040 03/08/24  0702   WBC 14.58* 13.92*   HGB 15.2 13.4*   HCT 46.1 41.5    297      CMP:   Recent Labs   Lab 03/07/24 2040 03/08/24  0702    135*   K 3.8 3.8    107   CO2 19* 21*   * 124*   BUN 19 17   CREATININE 1.0 1.0   CALCIUM 9.1 8.7   PROT 7.4 6.4   ALBUMIN 4.0 3.5   BILITOT 0.8 0.8   ALKPHOS 98 82   AST 10 9*   ALT 10 8*   ANIONGAP 10 7*       Significant Imaging: I have reviewed all pertinent imaging results/findings within the past 24 hours.

## 2024-03-08 NOTE — PROGRESS NOTES
[facility] Medicine  Progress Note    Patient Name: Arcadio Begum  MRN: [id number]  Patient Class: IP- Inpatient   Admission Date: 3/7/2024  Length of Stay: 1 days  Attending Physician: Windy Adam MD  Primary Care Provider: [facility]        Subjective:     Principal Problem:Sepsis without acute organ dysfunction        HPI:  77 yo male with PMH of bladder cancer on BCG treatment, last of which was today, who presented with generalized weakness and fever. Per pt, he got home today after the BCG treatment and started to have generalized weakness and fatigue. Soon after, he developed a fever of 101.2. Wife gave pt 1000 mg of tylenol [facility] and [facility] recommended to go to the ER for eval. Pt has dysuria which is normal to him after getting BCG treatment per pt. He also usually has frequency and urgency. ROS is positive for left flank pain for 2 week.     On arrival to the ER, pt was afebrile, with bp of 113/75, and HR of 75. Labs showed leukocytosis of 14.58. CMP, lactic acid and procal are wnl. Covid/flu negative. CT Chest/abdomen/pelvis showed Mild left perinephric stranding, heterogenous parenchymal enhancement of the kidney, mild hydronephrosis and hydroureter with periureteral fat stranding extending to the bladder and without obstructing ureteral stone. Findings are suspicious for pyelonephritis and pyelitis. Also findings suspicious for cystitis. Urine and blood cx collected. Pt was given zosyn and vanco and will be admitted to medicine for further care.    Overview/Hospital Course:  No notes on file    Interval History:  Notes reviewed, no acute events overnight.  Patient resting comfortably in bed.  He states he feels better today.  Dysuria and flank pain have improved.  Continue antibiotic therapy and monitor closely and await cultures.    Review of Systems   Constitutional:  Positive for activity change, chills and fatigue.   HENT:  Negative  for sore throat.    Eyes:  Negative for visual disturbance.   Respiratory:  Negative for cough and shortness of breath.    Cardiovascular:  Negative for chest pain, palpitations and leg swelling.   Gastrointestinal:  Negative for abdominal distention, abdominal pain, nausea and vomiting.   Endocrine: Negative for cold intolerance and heat intolerance.   Genitourinary:  Positive for dysuria and flank pain. Negative for frequency and urgency.   Musculoskeletal:  Positive for back pain and myalgias.   Skin:  Negative for rash.        Left leg pain.    Neurological:  Negative for syncope and headaches.   Psychiatric/Behavioral:  Negative for confusion and hallucinations.    All other systems reviewed and are negative.    Objective:     Vital Signs (Most Recent):  Temp: 97.6 °F (36.4 °C) (03/08/24 1633)  Pulse: 60 (03/08/24 1633)  Resp: 18 (03/08/24 1633)  BP: 114/67 (03/08/24 1633)  SpO2: 96 % (03/08/24 1633) Vital Signs (24h Range):  Temp:  [97.6 °F (36.4 °C)-98.5 °F (36.9 °C)] 97.6 °F (36.4 °C)  Pulse:  [60-90] 60  Resp:  [16-26] 18  SpO2:  [94 %-99 %] 96 %  BP: (104-119)/(57-76) 114/67     Weight: 86.6 kg (191 lb)  Body mass index is 30.83 kg/m².    Intake/Output Summary (Last 24 hours) at 3/8/2024 1650  Last data filed at 3/8/2024 1114  Gross per 24 hour   Intake 500 ml   Output 1550 ml   Net -1050 ml         Physical Exam  Vitals and nursing note reviewed.   Constitutional:       General: He is not in acute distress.     Appearance: Normal appearance. He is well-developed. He is not ill-appearing or diaphoretic.   HENT:      Head: Normocephalic and atraumatic.      Right Ear: External ear normal.      Left Ear: External ear normal.      Nose: Nose normal. No congestion or rhinorrhea.      Mouth/Throat:      Mouth: Mucous membranes are dry.      Pharynx: Oropharynx is clear. No oropharyngeal exudate or posterior oropharyngeal erythema.   Eyes:      General: No scleral icterus.     Extraocular Movements: Extraocular  movements intact.      Conjunctiva/sclera: Conjunctivae normal.      Pupils: Pupils are equal, round, and reactive to light.   Neck:      Vascular: No JVD.   Cardiovascular:      Rate and Rhythm: Normal rate and regular rhythm.      Pulses: Normal pulses.      Heart sounds: Normal heart sounds. No murmur heard.  Pulmonary:      Effort: Pulmonary effort is normal. No respiratory distress.      Breath sounds: Normal breath sounds. No stridor. No wheezing, rhonchi or rales.   Abdominal:      General: Abdomen is flat. Bowel sounds are normal. There is no distension.      Palpations: Abdomen is soft.      Tenderness: There is no abdominal tenderness.   Genitourinary:     Comments: Left CVA tenderness.   Musculoskeletal:         General: No swelling or tenderness. Normal range of motion.      Cervical back: Normal range of motion and neck supple.   Skin:     General: Skin is warm and dry.      Capillary Refill: Capillary refill takes 2 to 3 seconds.      Coloration: Skin is not jaundiced or pale.      Findings: No erythema.   Neurological:      General: No focal deficit present.      Mental Status: He is alert and oriented to person, place, and time.      Cranial Nerves: No cranial nerve deficit.      Sensory: No sensory deficit.   Psychiatric:         Mood and Affect: Mood normal.         Behavior: Behavior normal.         Thought Content: Thought content normal.             Significant Labs: All pertinent labs within the past 24 hours have been reviewed.  CBC:   Recent Labs   Lab 03/07/24 2040 03/08/24  0702   WBC 14.58* 13.92*   HGB 15.2 13.4*   HCT 46.1 41.5    297     CMP:   Recent Labs   Lab 03/07/24 2040 03/08/24  0702    135*   K 3.8 3.8    107   CO2 19* 21*   * 124*   BUN 19 17   CREATININE 1.0 1.0   CALCIUM 9.1 8.7   PROT 7.4 6.4   ALBUMIN 4.0 3.5   BILITOT 0.8 0.8   ALKPHOS 98 82   AST 10 9*   ALT 10 8*   ANIONGAP 10 7*       Significant Imaging: I have reviewed all pertinent imaging  "results/findings within the past 24 hours.    Assessment/Plan:      * Sepsis without acute organ dysfunction  Fever at home of 102, and leukocytosis on presentation.  CT abdomen/pelvis showed findings suspicious for pyelonephritis, pyelitis and cystitis.  UA is positive. Urine cx currently in process.   Blood cx collected.   Pt was started on zosyn and vanco which I will continue. Will consult ID.  Iv fluids for hydration.       Primary hypertension  Chronic, controlled.  Latest blood pressure and vitals reviewed-     Temp:  [97.6 °F (36.4 °C)-98.5 °F (36.9 °C)]   Pulse:  [60-90]   Resp:  [16-26]   BP: (104-119)/(57-76)   SpO2:  [94 %-99 %] .   Home meds for hypertension were reviewed and noted below-  Hypertension Medications               amLODIPine (NORVASC) 10 MG tablet Take 5 mg by mouth once daily.    metoprolol succinate (TOPROL-XL) 25 MG 24 hr tablet Take 25 mg by mouth once daily.            While in the hospital, will manage blood pressure as follows; Continue home antihypertensive regimen    Will utilize p.r.n. blood pressure medication only if patient's blood pressure greater than 180/110 and he develops symptoms such as worsening chest pain or shortness of breath.      Pyelonephritis  CT reviewed  Urine and blood cx pending.  Continue zosyn and vanco for now.   ID consulted      Type 2 diabetes mellitus without complication, without long-term current use of insulin  Patient's FSGs are controlled on current medication regimen.  Last A1c reviewed-   Lab Results   Component Value Date    HGBA1C 8.5 (H) 03/08/2024     Most recent fingerstick glucose reviewed- No results for input(s): "POCTGLUCOSE" in the last 24 hours.  Current correctional scale  Low  Maintain anti-hyperglycemic dose as follows-   Antihyperglycemics (From admission, onward)      Start     Stop Route Frequency Ordered    03/08/24 0107  insulin aspart U-100 pen 0-5 Units         -- SubQ Before meals & nightly PRN 03/08/24 0007          Hold " Oral hypoglycemics while patient is in the hospital.      Bladder cancer  [facility]  Urology follow up outpt.      CAD (coronary artery disease)  Chronic, stable  Resume aspirin, and metoprolol.       VTE Risk Mitigation (From admission, onward)           Ordered     enoxaparin injection 40 mg  Daily        Note to Pharmacy: Ht:    Wt: 86.6 kg (191 lb)  Estimated Creatinine Clearance: 63.8 mL/min (based on SCr of 1 mg/dL).  Body mass index is 30.83 kg/m².    03/07/24 2344     IP VTE HIGH RISK PATIENT  Once         03/07/24 2344     Place sequential compression device  Until discontinued         03/07/24 2344                    Discharge Planning   KRISTAN: 3/11/2024     Code Status: Full Code   Is the patient medically ready for discharge?:     Reason for patient still in hospital (select all that apply): Laboratory test, Treatment, and Consult recommendations  Discharge Plan A: Home with family                  Clarissa Eid NP  Department of Hospital Medicine   [facility]

## 2024-03-08 NOTE — NURSING
Nurses Note -- 4 Eyes      3/8/2024   5:29 PM      Skin assessed during: Admit      [x] No Altered Skin Integrity Present    []Prevention Measures Documented      [] Yes- Altered Skin Integrity Present or Discovered   [] LDA Added if Not in Epic (Describe Wound)   [] New Altered Skin Integrity was Present on Admit and Documented in LDA   [] Wound Image Taken    Wound Care Consulted? No    Attending Nurse:  Valeria Ac RN/Staff Member:  Miky

## 2024-03-08 NOTE — ASSESSMENT & PLAN NOTE
No swelling, redness or trauma.   Will order CPK. Hold lipitor until result is back.  Will order duplex to rule out dvt given his cancer diagnose.  Will order x-ray left femur as pt's pain is mainly located left thigh laterally.

## 2024-03-08 NOTE — FIRST PROVIDER EVALUATION
Emergency Department TeleTriage Encounter Note      CHIEF COMPLAINT    Chief Complaint   Patient presents with    Fever     101.2 at home. Tylenol at 1915. On chemo for bladder cancer. Had chemo today(BCG treatment)    Generalized Body Aches       VITAL SIGNS   Initial Vitals [03/07/24 2011]   BP Pulse Resp Temp SpO2   113/75 90 16 98.5 °F (36.9 °C) (!) 94 %      MAP       --            ALLERGIES    Review of patient's allergies indicates:  No Known Allergies    PROVIDER TRIAGE NOTE  TeleTriage Note: Arcadio Begum, a nontoxic/well appearing, 78 y.o. male, presented to the ED with c/o weakness and fever that began a few hours ago. Currently having BCG therapy for bladder cancer (last dose today). Took tylenol around 1915. Painful urination currently.     All ED beds are full at present; patient notified of this status.  Patient seen and medically screened by Nurse Practitioner via teletriage. Orders initiated at triage to expedite care.  Patient is stable to return to the waiting room and will be placed in an ED bed when available.  Care will be transferred to an alternate provider when patient has been placed in an Exam Room from the [facility] for physical exam, additional orders, and disposition.  8:26 PM Franchesca Antonio DNP, FNP-C        ORDERS  Labs Reviewed   CULTURE, BLOOD   CULTURE, BLOOD   CBC W/ AUTO DIFFERENTIAL   COMPREHENSIVE METABOLIC PANEL   LACTIC ACID, PLASMA   URINALYSIS, REFLEX TO URINE CULTURE       ED Orders (720h ago, onward)      Start Ordered     Status Ordering Provider    03/08/24 0028 03/07/24 2028  Lactic acid, plasma #2  In 4 hours         Ordered FRANCHESCA ANTONIO    03/07/24 2028 03/07/24 2028  ED Preference List Used to Initiate Sepsis Orders  Until discontinued         Ordered FRANCHESCA ANTONIO    03/07/24 2028 03/07/24 2028  Blood culture x two cultures. Draw prior to antibiotics.  Every 15 min      Comments: Aerobic and anaerobic     Order ID Start Status Ordering Provider    [id number] 03/07/24 2028 Ordered JABIERJAMAICAMaxx   [id number] 03/07/24 2043 Ordered JABIER, JAMAICA MONTANOMaxx       Ordered JAIBER, JAMAICA MONTANOMaxx    03/07/24 2028 03/07/24 2028  CBC auto differential  STAT         Ordered JABIER, JAMAICA MONTANOMaxx    03/07/24 2028 03/07/24 2028  Comprehensive metabolic panel  STAT         Ordered JABIERJAMAICAMaxx    03/07/24 2028 03/07/24 2028  Lactic acid, plasma #1  STAT         Ordered JABIER, JAMAICA MONTANOMaxx    03/07/24 2028 03/07/24 2028  Vital signs  Every 15 min        Comments: Every 15 minutes until SBP greater than 90 or MAP greater than 65, then every 30 minutes times one hour, then every one hour.    Ordered JAMAICA EUGENEMaxx    03/07/24 2028 03/07/24 2028  Bed rest  Until discontinued         Ordered JABIER JAMAICA MONTANOMaxx    03/07/24 2028 03/07/24 2028  Cardiac Monitoring - Adult  Continuous        Comments: Notify Physician If:    Ordered JAMAICA EUGENEMaxx    03/07/24 2028 03/07/24 2028  Pulse Oximetry Continuous  Continuous         Ordered JABIER JAMAICA MONTANOMaxx    03/07/24 2028 03/07/24 2028  Strict intake and output  Until discontinued         Ordered JABIER JAMAICA MONTANOMaxx    03/07/24 2028 03/07/24 2028  Urinalysis, Reflex to Urine Culture Urine, Clean Catch  STAT         Ordered JABIER, JAMAICA MONTANOMaxx    03/07/24 2028 03/07/24 2028  Saline lock IV  Once         Ordered JABIER JAMAICA MONTANOMaxx    03/07/24 2028 03/07/24 2028  EKG 12-lead  Once         Ordered JABIER, JAMAICA MONTANOMaxx    03/07/24 2028 03/07/24 2028  X-Ray Chest AP Portable  1 time imaging         Ordered JABIER, JAMAICA ALEIDA              Virtual Visit Note: The provider triage portion of this emergency department evaluation and documentation was performed via [employer], a HIPAA-compliant telemedicine application, in concert with a tele-presenter in the room. A face to face patient evaluation with one of my colleagues will occur once the patient is placed in an emergency department room.      DISCLAIMER: This note was prepared with  M*Modal voice recognition transcription software. Garbled syntax, mangled pronouns, and other bizarre constructions may be attributed to that software system.

## 2024-03-08 NOTE — ASSESSMENT & PLAN NOTE
>>ASSESSMENT AND PLAN FOR SEPSIS WITHOUT ACUTE ORGAN DYSFUNCTION WRITTEN ON 3/8/2024  5:53 AM BY GREGORY SHIN MD    Fever at home of 102, and leukocytosis on presentation.  CT abdomen/pelvis showed findings suspicious for pyelonephritis, pyelitis and cystitis.  UA is positive. Urine cx currently in process.   Blood cx collected.   Pt was started on zosyn and vanco which I will continue. Will consult ID.  Iv fluids for hydration.

## 2024-03-08 NOTE — ASSESSMENT & PLAN NOTE
Fever at home of 102, and leukocytosis on presentation.  CT abdomen/pelvis showed findings suspicious for pyelonephritis, pyelitis and cystitis.  UA is positive. Urine cx currently in process.   Blood cx collected.   Pt was started on zosyn and vanco which I will continue. Will consult ID.  Iv fluids for hydration.

## 2024-03-08 NOTE — PLAN OF CARE
[facility] - Emergency Dept  Initial Discharge Assessment       [facility]    Admission Diagnosis: Pyelonephritis [N12]    Admission Date: 3/7/2024  Expected Discharge Date: 3/11/2024      met with Pt at bedside to complete discharge assessment. Pt AAOx4s. Demographics, PCP, and insurance verified. No home health. No dialysis. Pt reports ability to complete ADLs without assistance; he uses a rolling walker.  Pt verbalized plan to discharge home via family transport. Pt has no other needs to be addressed at this time.      Transition of Care Barriers: None    Payor: [employer] / Plan: [facility] OPTUM / Product Type: Government /     Extended Emergency Contact Information  Primary Emergency Contact: Cleo Begum  Mobile Phone: [phone]  Relation: Spouse  Preferred language: English   needed? No    Discharge Plan A: Home with family  Discharge Plan B: Home      [facility] [id number] [address] [facility]  [id number]  [place] [address]  Phone: [phone] Fax: [phone]      Initial Assessment (most recent)       Adult Discharge Assessment - 03/08/24 1458          Discharge Assessment    Assessment Type Discharge Planning Assessment     Confirmed/corrected address, phone number and insurance Yes     Confirmed Demographics Correct on Facesheet     Source of Information patient     When was your last doctors appointment? --   6 to 8 months ago, but has an upcoming appointment    Communicated KRISTAN with patient/caregiver Date not available/Unable to determine     Reason For Admission Sepsis without acute organ dysfunction     People in Home spouse     Facility Arrived From: home     Do you expect to return to your current living situation? Yes     Do you have help at home or someone to help you manage your care at home? Yes     Who are your caregiver(s) and their phone number(s)? Cleo Begum/spouse  [phone]     Prior to hospitilization cognitive status: Alert/Oriented     Current cognitive status: Alert/Oriented     Walking or Climbing Stairs Difficulty yes     Walking or Climbing Stairs ambulation difficulty, requires equipment     Mobility Management walker     Dressing/Bathing Difficulty no     Home Accessibility not wheelchair accessible     Equipment Currently Used at Home none     Readmission within 30 days? Yes     Patient currently being followed by outpatient case management? No     Do you currently have service(s) that help you manage your care at home? No     Do you take prescription medications? Yes     Do you have prescription coverage? Yes     Coverage Payor: [employer] - [facility] OPTUM -     Do you have any problems affording any of your prescribed medications? No     Is the patient taking medications as prescribed? yes     Who is going to help you get home at discharge? Cleo Begum/spouse [phone]     How do you get to doctors appointments? car, drives self     Are you on dialysis? No     Do you take coumadin? No     Discharge Plan A Home with family     Discharge Plan B Home     DME Needed Upon Discharge  none     Discharge Plan discussed with: Patient     Transition of Care Barriers None

## 2024-03-08 NOTE — ASSESSMENT & PLAN NOTE
Fever at home of 102, and leukocytosis on presentation.  CT abdomen/pelvis showed findings suspicious for pyelonephritis, pyelitis and cystitis.  UA and urine cx currently in process.   Blood cx collected.   Pt was started on zosyn and vanco which I will continue.   Iv fluids for hydration.

## 2024-03-08 NOTE — CONSULTS
[facility] - Emergency Dept   Department of Infectious Disease  Consult Note        PATIENT NAME: Arcadio Begum  MRN: [id number]  TODAY'S DATE: 03/08/2024  ADMIT DATE: 3/7/2024  LENGTH OF STAY: 1 DAYS      CHIEF COMPLAINT: Fever (101.2 at home. Tylenol at 1915. On chemo for bladder cancer. Had chemo today(BCG treatment)) and Generalized Body Aches    PRINCIPLE PROBLEM: Sepsis without acute organ dysfunction    REASON FOR CONSULT:  Pyelonephritis    ASSESSMENT and PLAN      1. Sepsis with Pyelonephritis with left sided hydronephrosis and hydroureter in setting BCG treatment for stage I bladder cancer status post tumor resection in August and September 2023  -CT scan of abdomen with left-sided hydronephrosis, hydroureter, possible pyelonephritis and pyelitis and distended bladder and diverticulum with stranding of the wall, foci of gas and persistent left bladder wall thickening most likely from previously diagnosed bladder cancer  -mild leukocytosis with WBC 14.58-->13.92 +eft shift 89% neutrophils, no bands, lactic acid 1.0, negative  -blood pressure adequate, heart rate in the 60 70s on metoprolol, afebrile  -CRP pending, procalcitonin 0.425, negative  -BUN/CR 19/1.0, creatinine clearance 63.8  -UA appears grossly infected, culture pending, blood cultures x2 sets, pending    2. Diabetes mellitus with hemoglobin A1c 8.5      RECOMMENDATIONS:  Stop vancomycin   Continue Zosyn 3.375 g IV every 8 hours  Obtain AFB culture of urine and blood x2  Monitor fever curve   Monitor urine output   Follow cultures   Obtain CRP  Obtain MRSA screen   Probiotic      Plan discussed with Dr Gilman    Thank you for this consult. Please send [employer] secure chat with any questions.    Antibiotics (From admission, onward)      Start     Stop Route Frequency Ordered    03/08/24 0600  piperacillin-tazobactam 3.375 g in dextrose 5 % 100 mL IVPB (ready to mix)        Note to Pharmacy: Ht:    Wt: 86.6 kg (191 lb)  Estimated Creatinine  Clearance: 63.8 mL/min (based on SCr of 1 mg/dL).  Body mass index is 30.83 kg/m².    -- IV Every 8 hours (non-standard times) 03/08/24 0008            [facility]      Arcadio Begum is a 78 y.o. male with chronic medical problems including hypertension, diabetes, and recently diagnosed with bladder cancer undergoing treatments with BCG who presented to the emergency room on 03/07 complaining of fevers and chills.  His most recent treatment was earlier that day.  He said he felt fine most of the day but around 7:00 p.m. started feeling bad with chills and had a fever of 101.2.  He also started having dysuria and suprapubic discomfort.  He said at baseline he has some burning when he voids that comes and goes and also frequency and urgency.  He said especially when he is lying down at night he will get up to void every hour.  Associated symptoms are weakness and fatigue.  He said he has chronic shortness of breath that has no worse than usual and some chronic sinus congestion when he wakes up in the morning that is no worse than usual.  He has been on BCG treatments 1st for 8 cycles and this is his 2nd set of what was supposed to be 8 cycles and Thursday was 3.  He said they were just going to stop it 3 and then maybe resume in May again.  He said he was told that he was free of cancer now.  He lives at home with his wife, has no pets, is retired from sales.  He quit smoking in 2017 after smoking since he was a teenager.  He does not drink any alcohol and has no history of drug use.  He was recently admitted to the hospital on 02/23 after a BCG treatment with confusion and drowsiness.  He was discharged the next day after being determine that he had taken an extra dose of oxycodone.  He said that it was most likely from him working very hard in his yd for the 3 days prior and not getting much sleep because he was up all night voiding.  [facility] [place]  At that time he had a urinalysis done  with a culture with 49-99K CFU/ml group B strep and he was sent home on cefdinir.  He said [facility] they gave him cefuroxime 250 mg twice daily for 3 days and he said that cleared up all of his symptoms.  He also has a history Pseudomonas aeruginosa urinary tract infections in August.  He was seen here in August after  diagnosis of stage I bladder cancer and TURBT on 08/18 and and diagnosed with a urinary catheter was placed and he was given Ceftin for urinary tract infection.  The culture ended up growing Pseudomonas aeruginosa.  He came back to the ER again because of worsening dysuria and confusion with urinary retention and had a catheter placed and again his culture came back with Pseudomonas aeruginosa.  He was sent home on cefepime for 7 days.  He said all of his symptoms cleared up after that.  He was not been seen by infectious disease in the past.  He has been seen by Urology here back in August otherwise [facility]  He had a mass removed at 1 point from his testicle on the left.  He was not sure what it was.  He said he still has some tenderness there and at 1 time had a small wound there.  He was supposed to have a cystoscopy to have his bladder opening dilated and have a repeat vasectomy.    T-max since admission 98.5, MRSA screen negative, urine culture pending, blood cultures x2 pending, mild leukocytosis with WBC 14.58, left shift at 89% neutrophils, no bands, H&H 15.2/46.1, BUN/CR 19/1.0 with estimated creatinine clearance 63.8, total CK 26, lactic acid 1.0.  Hemoglobin A1c is 8.5, procalcitonin 0.4-5, MRSA screen negative, influenza and COVID screens negative.  Urinalysis cloudy with high specific gravity, 3+ protein, 4+ glucose, hematuria, pyuria and bacteriuria.  Chest x-ray with nothing acute.  Lower extremity ultrasound no DVT.  CT chest abdomen and pelvis with distended bladder diverticulum with persistent bladder wall thickening and stranding of the wall  containing foci of gas suspicious for cystitis and mild left perinephric stranding with mild hydronephrosis and hydroureter and periureteral fat stranding extending to the bladder and without obstructing ureteral stone suspicious for pyelonephritis and pyelitis.  Also a 3.7 cm infrarenal abdominal aneurysm.  Please see CT report for full details as he had other abnormalities such as an abdominal aortic aneurysm and a lipoma.  He was started on vancomycin and Zosyn.      Social history/Outdoor activities:  Lives at home with wife, former long-time smoker, no alcohol use, no drug use, retired from sales, no pets but used to have horses and his neighbor has goats and miniature horses; usually independent, drives herself to and from appointment  Travel:  None recent  Implants:  None?  Antibiotic history:  See HPI    MEDICAL HISTORY   Basal cell carcinoma of the skin   Dyslipidemia   Bladder cancer   Hypertension   Diabetes mellitus  Diverticulosis   Phimosis     SURGICAL HISTORY   TURBT - cystoscopy and treatment  9/1523      Review of patient's allergies indicates:  No Known Allergies    Current Outpatient Medications   Medication Instructions    acetaminophen (TYLENOL) 500 mg, Oral, Every 6 hours PRN    amLODIPine (NORVASC) 5 mg, Oral, Daily    aspirin (ECOTRIN) 81 mg, Oral, Daily    atorvastatin (LIPITOR) 80 mg, Oral, Daily    empagliflozin (JARDIANCE) 12.5 mg, Oral, Daily    ibuprofen (ADVIL,MOTRIN) 200 mg, Oral, Every 6 hours PRN    metoprolol succinate (TOPROL-XL) 25 mg, Oral, Daily    oxyCODONE-acetaminophen (PERCOCET) 5-325 mg per tablet 1 tablet, Oral, Every 6 hours PRN     Scheduled Meds:   [START ON 3/9/2024] amLODIPine  5 mg Oral Daily    aspirin  81 mg Oral Daily    atorvastatin  80 mg Oral QHS    enoxparin  40 mg Subcutaneous Daily    metoprolol succinate  25 mg Oral Daily    piperacillin-tazobactam (Zosyn) IV (PEDS and ADULTS) (extended infusion is not appropriate)  3.375 g Intravenous Q8H     senna-docusate 8.6-50 mg  1 tablet Oral BID     Continuous Infusions:   sodium chloride 0.9% 100 mL/hr at 03/08/24 0240     PRN Meds:.acetaminophen, aluminum-magnesium hydroxide-simethicone, dextrose 50% in water (D50W), dextrose 50% in water (D50W), glucagon (human recombinant), glucose, glucose, insulin aspart U-100, magnesium oxide, magnesium oxide, melatonin, naloxone, ondansetron, oxyCODONE-acetaminophen, potassium bicarbonate, potassium bicarbonate, potassium bicarbonate, potassium, sodium phosphates, potassium, sodium phosphates, potassium, sodium phosphates, sodium chloride 0.9%    Review of Systems  Constitutional: + fevers, chills, fatigue, and, loss of appetite. No night sweats  HEENT: Denies visual changes, ear pain, mouth pain or trouble swallowing, sore throat. + chronic sinus congestion  Neck: Denies neck pain or lumps.  Respiratory: Denies  coughing, wheezing or hemoptysis.+ chronic intermittent SOB  Cardiovascular:  Denies chest pain, palpitations or edema.  Gastrointestinal: Denies  nausea, vomiting, constipation or diarrhea.  Genitourinary:  + dysuria, frequency, urgency, No hematuria   Musculoskeletal:  Denies joint pain or swelling, difficulty walking.    Skin:  Denies rash or itching.  Neurologic:  Denies motor or sensory loss, headaches or dizziness.  + weakness  Psychiatric:  Denies changes in mood or behavior.    OBJECTIVE     Temp:  [97.8 °F (36.6 °C)-98.5 °F (36.9 °C)] 97.8 °F (36.6 °C)  Pulse:  [66-90] 70  Resp:  [16-26] 18  SpO2:  [94 %-99 %] 95 %  BP: (104-119)/(57-76) 119/76  Temp:  [97.8 °F (36.6 °C)-98.5 °F (36.9 °C)]   Temp: 97.8 °F (36.6 °C) (03/08/24 1100)  Pulse: 70 (03/08/24 1207)  Resp: 18 (03/08/24 1250)  BP: 119/76 (03/08/24 1207)  SpO2: 95 % (03/08/24 1207)    Intake/Output Summary (Last 24 hours) at 3/8/2024 1413  Last data filed at 3/8/2024 1114  Gross per 24 hour   Intake 500 ml   Output 1550 ml   Net -1050 ml        Physical Exam  General:  Elderly male, lying in bed,  "pleasant and conversant, his son is at bedside who also provides history.  He is a good historian.  Eyes: Eyes with no icterus or injection. Vision grossly normal  Ears: Hearing grossly normal.  Nose: Nares patent  Mouth:  Dry mucous membranes, upper dentures, lower partial. No ulcerations, erythema or exudates.  Neck: Supple, no tenderness to palpation.  Cardiovascular: Regular rate and rhythm, no murmurs, no edema.    Respiratory: Clear to auscultation bilaterally, no tachypnea or increased work of breathing.  On room air.  Gastrointestinal: Soft with active bowel sounds, no tenderness to palpation, no distention.  Genitourinary: Mild suprapubic tenderness, no blood at meatus, no groin rash, no wound or lesions on testicles.  Musculoskeletal: Moves all extremities with equal strength.    Skin: Warm and dry, no obvious rashes.    Neuro: Oriented, conversant, follows commands.  Psych: Good mood, normal affect.  VAD: PIV  Isolation: None    Wounds  None      Significant Labs: All pertinent labs within the past 24 hours have been reviewed.    CBC LAST 7 DAYS  Recent Labs   Lab 03/07/24 2040 03/08/24  0702   WBC 14.58* 13.92*   RBC 5.41 4.91   HGB 15.2 13.4*   HCT 46.1 41.5   MCV 85 85   MCH 28.1 27.3   MCHC 33.0 32.3   RDW 14.0 14.1    297   MPV 10.3 10.5   GRAN 89.3*  13.0* 84.5*  11.8*   LYMPH 3.9*  0.6* 6.3*  0.9*   MONO 5.4  0.8 7.3  1.0   BASO 0.09 0.06   NRBC 0 0       CHEMISTRY LAST 7 DAYS  Recent Labs   Lab 03/07/24 2040 03/08/24  0702    135*   K 3.8 3.8    107   CO2 19* 21*   ANIONGAP 10 7*   BUN 19 17   CREATININE 1.0 1.0   * 124*   CALCIUM 9.1 8.7   MG  --  1.8   ALBUMIN 4.0 3.5   PROT 7.4 6.4   ALKPHOS 98 82   ALT 10 8*   AST 10 9*   BILITOT 0.8 0.8       Estimated Creatinine Clearance: 63.8 mL/min (based on SCr of 1 mg/dL).    INFLAMMATORY/PROCAL  LAST 7 DAYS  Recent Labs   Lab 03/07/24  2152 03/08/24  1308   PROCAL 0.327 0.425     No results found for: "ESR"  CRP   Date " Value Ref Range Status   09/05/2023 6.6 0.0 - 8.2 mg/L Final       PRIOR MICROBIOLOGY:  Susceptibility data from last 90 days.  Collected Specimen Info Organism   02/22/24 Urine Streptococcus agalactiae (Group B)   02/22/24 Blood No growth after 5 days.   02/22/24 Blood from Peripheral, Antecubital, Right No growth after 5 days.       LAST 7 DAYS MICROBIOLOGY   Microbiology Results (last 7 days)       Procedure Component Value Units Date/Time    MRSA Screen by PCR [[id number]] Collected: 03/08/24 1326    Order Status: Sent Specimen: Nasopharyngeal Swab from Nasal Updated: 03/08/24 1331    Blood culture x two cultures. Draw prior to antibiotics. [[id number]] Collected: 03/07/24 2042    Order Status: Completed Specimen: Blood from Peripheral, Antecubital, Right Updated: 03/08/24 0358     Blood Culture, Routine No Growth to date    Narrative:      Aerobic and anaerobic    Blood culture x two cultures. Draw prior to antibiotics. [[id number]] Collected: 03/07/24 2040    Order Status: Completed Specimen: Blood from Peripheral, Antecubital, Left Updated: 03/08/24 0358     Blood Culture, Routine No Growth to date    Narrative:      Aerobic and anaerobic    Urine culture [[id number]] Collected: 03/07/24 2259    Order Status: No result Specimen: Urine Updated: 03/07/24 2337              CURRENT/PREVIOUS VISIT EKG  Results for orders placed or performed during the hospital encounter of 03/07/24   EKG 12-lead    Collection Time: 03/07/24  9:07 PM   Result Value Ref Range    QRS Duration 86 ms    OHS QTC Calculation 422 ms    Narrative    Test Reason : R50.9,    Vent. Rate : 075 BPM     Atrial Rate : 075 BPM     P-R Int : 154 ms          QRS Dur : 086 ms      QT Int : 378 ms       P-R-T Axes : 054 -35 043 degrees     QTc Int : 422 ms    Normal sinus rhythm  Possible Left atrial enlargement  Left axis deviation  Abnormal ECG  When compared with ECG of 22-FEB-2024 22:50,  No significant change was found    Referred By: [email or web address]    SELF           Confirmed By:          Significant Imaging: I have reviewed all relevant and available imaging results/findings within the past 24 hours.    CT Chest Abdomen Pelvis With IV Contrast (XPD) Routine Oral Contrast [03/07/24 2203   Chest:   1. No acute abnormality.   1. Chronic findings as above.   Abdomen and pelvis:   1. Mild left perinephric stranding, heterogenous parenchymal enhancement of the kidney, mild hydronephrosis and hydroureter with periureteral fat stranding extending to the bladder and without obstructing ureteral stone. Findings are suspicious for pyelonephritis and pyelitis.   2. Distended bladder and diverticulum with stranding of the wall, containing foci of gas within the bladder and persistent bladder wall thickening in the left. Findings are suspicious for cystitis. The persistent bladder wall thickening of the left is suspicious for mass. Recommend Urology consult.   3. A 3.7 cm infrarenal abdominal aneurysm. Recommend follow-up every 2 years. Reference: J Am Donato Radiol 2013;10:789-794.   4. Additional chronic findings as above.    US Lower Extremity Veins Left  03/08/24 0035   No evidence of left lower extremity deep venous thrombosis.    X-Ray Chest AP Portable 03/07/24 2103   No acute cardiac or pulmonary process.    X-Ray Femur 2 View Left 03/08/24 0023   No acute osseous abnormality.      Gilda Rodarte NP  Date of Service: 03/08/2024

## 2024-03-08 NOTE — PHARMACY MED REC
"              .        Admission Medication History     The home medication history was taken by Kenisha Rios.    You may go to "Admission" then "Reconcile Home Medications" tabs to review and/or act upon these items.     The home medication list has been updated by the Pharmacy department.   Please read ALL comments highlighted in yellow.   Please address this information as you see fit.    Feel free to contact us if you have any questions or require assistance.          Medications listed below were obtained from: Patient/family and Analytic software- [employer]  No current facility-administered medications on file prior to encounter.     Current Outpatient Medications on File Prior to Encounter   Medication Sig Dispense Refill    acetaminophen (TYLENOL) 500 MG tablet Take 500 mg by mouth every 6 (six) hours as needed for Pain.      amLODIPine (NORVASC) 10 MG tablet Take 5 mg by mouth once daily.      aspirin (ECOTRIN) 81 MG EC tablet Take 81 mg by mouth once daily.      atorvastatin (LIPITOR) 80 MG tablet Take 80 mg by mouth once daily.      empagliflozin (JARDIANCE) 25 mg tablet Take 12.5 mg by mouth once daily.      ibuprofen (ADVIL,MOTRIN) 200 MG tablet Take 200 mg by mouth every 6 (six) hours as needed for Pain.      metoprolol succinate (TOPROL-XL) 25 MG 24 hr tablet Take 25 mg by mouth once daily.      oxyCODONE-acetaminophen (PERCOCET) 5-325 mg per tablet Take 1 tablet by mouth every 6 (six) hours as needed for Pain.         Kenisha Rios  EXT 1924  "

## 2024-03-08 NOTE — ASSESSMENT & PLAN NOTE
"Patient's FSGs are controlled on current medication regimen.  Last A1c reviewed-   Lab Results   Component Value Date    HGBA1C 8.5 (H) 03/08/2024     Most recent fingerstick glucose reviewed- No results for input(s): "POCTGLUCOSE" in the last 24 hours.  Current correctional scale  Low  Maintain anti-hyperglycemic dose as follows-   Antihyperglycemics (From admission, onward)      Start     Stop Route Frequency Ordered    03/08/24 0107  insulin aspart U-100 pen 0-5 Units         -- SubQ Before meals & nightly PRN 03/08/24 0007          Hold Oral hypoglycemics while patient is in the hospital.    "

## 2024-03-08 NOTE — ASSESSMENT & PLAN NOTE
No swelling, redness or trauma.   Will order CPK. Hold lipitor until result is back.  Will order duplex to rule out dvt given his cancer diagnose.  Will consult PT/OT.

## 2024-03-08 NOTE — ED PROVIDER NOTES
Encounter Date: 3/7/2024       History     Chief Complaint   Patient presents with    Fever     101.2 at home. Tylenol at 1915. On chemo for bladder cancer. Had chemo today(BCG treatment)    Generalized Body Aches     HPI    78-year-old man with a history of bladder cancer who is getting BCG injections presents for evaluation of fever chills fatigue.  Started after he got his BCG today.  Reports fever to 101.2.  He took Tylenol just prior to arrival.  He denies URI symptoms chest pain shortness of breath belly pain nausea vomiting diarrhea.  He reports pain with urination this occurs frequently when he has BCG treatments.  He reports he does not typically have fever and chills after treatments.    Review of patient's allergies indicates:  No Known Allergies  No past medical history on file.  No past surgical history on file.  No family history on file.  Social History     Substance Use Topics    Alcohol use: Not Currently     Review of Systems   All other systems reviewed and are negative.      Physical Exam     Initial Vitals [03/07/24 2011]   BP Pulse Resp Temp SpO2   113/75 90 16 98.5 °F (36.9 °C) (!) 94 %      MAP       --         Physical Exam    Constitutional: He appears well-developed and well-nourished.   HENT:   Head: Normocephalic and atraumatic.   Eyes: Right eye exhibits no discharge. Left eye exhibits no discharge.   Neck: No tracheal deviation present.   Cardiovascular:  Normal rate, regular rhythm and intact distal pulses.           Pulmonary/Chest: Breath sounds normal. No stridor. No respiratory distress.   Abdominal: Abdomen is soft. Bowel sounds are normal. There is abdominal tenderness.   Soft abdomen left lower quadrant tenderness to palpation without rebound or guarding There is no rebound and no guarding.   Musculoskeletal:         General: No edema.     Neurological: He is alert and oriented to person, place, and time.   Skin: Skin is warm and dry.         ED Course   Procedures  Labs  Reviewed   CBC W/ AUTO DIFFERENTIAL - Abnormal; Notable for the following components:       Result Value    WBC 14.58 (*)     Gran # (ANC) 13.0 (*)     Immature Grans (Abs) 0.06 (*)     Lymph # 0.6 (*)     Gran % 89.3 (*)     Lymph % 3.9 (*)     All other components within normal limits   COMPREHENSIVE METABOLIC PANEL - Abnormal; Notable for the following components:    CO2 19 (*)     Glucose 116 (*)     All other components within normal limits   URINALYSIS, REFLEX TO URINE CULTURE - Abnormal; Notable for the following components:    Color, UA Orange (*)     Appearance, UA Cloudy (*)     Specific Gravity, UA >1.030 (*)     Protein, UA 3+ (*)     Glucose, UA 4+ (*)     Occult Blood UA 3+ (*)     Leukocytes, UA 3+ (*)     All other components within normal limits    Narrative:     Specimen Source->Urine   URINALYSIS MICROSCOPIC - Abnormal; Notable for the following components:    RBC, UA >100 (*)     WBC, UA >100 (*)     WBC Clumps, UA Many (*)     Bacteria Many (*)     All other components within normal limits    Narrative:     Specimen Source->Urine   CULTURE, BLOOD   CULTURE, BLOOD   CULTURE, URINE   LACTIC ACID, PLASMA   INFLUENZA A AND B ANTIGEN    Narrative:     Specimen Source->Nasopharyngeal Swab   SARS-COV-2 RNA AMPLIFICATION, QUAL   PROCALCITONIN   LIPASE   LIPASE   LACTIC ACID, PLASMA   HEMOGLOBIN A1C   CK   COMPREHENSIVE METABOLIC PANEL   MAGNESIUM   CBC W/ AUTO DIFFERENTIAL   POCT GLUCOSE MONITORING CONTINUOUS          Imaging Results              US Lower Extremity Veins Left (Final result)  Result time 03/08/24 01:13:20      Final result by Bob Wright MD (03/08/24 01:13:20)                   Narrative:    EXAM DESCRIPTION: US LOWER EXTREMITY VEINS LEFT    CLINICAL HISTORY: 78 years Male, pain, cancer pt    COMPARISON: None.    TECHNIQUE: Grayscale, color Doppler, and spectral Doppler analysis of the veins of the left lower extremity was performed.    FINDINGS:    Left common femoral, superficial  femoral, and popliteal veins appear patent with normal compressibility, Doppler flow, and augmentation. Left posterior tibial, peroneal, and anterior tibial veins appear patent.    IMPRESSION:    No evidence of left lower extremity deep venous thrombosis.    Electronically signed by:  Bob Wright MD  03/08/2024 01:13 AM CST Workstation: [id number]                                     X-Ray Femur 2 View Left (In process)                      CT Chest Abdomen Pelvis With IV Contrast (XPD) Routine Oral Contrast (Final result)  Result time 03/07/24 22:54:58      Final result by Kai Barba MD (03/07/24 22:54:58)                   Narrative:    A EXAM DESCRIPTION:  CT CHEST ABDOMEN PELVIS WITH IV CONTRAST (XPD)    CLINICAL HISTORY:  78 years  Male  Sepsis; LLQ abd pain    TECHNIQUE:  CT chest, abdomen, pelvis protocol using intravenous contrast. All CT scans at this facility use dose modulation, iterative reconstruction, and/or weight based dosing when appropriate to reduce radiation dose to as low as reasonably achievable.    COMPARISON: CT abdomen and pelvis 8/27/2023.    FINDINGS:    Chest:  Lungs: No consolidation.  Pleura: No pneumothorax. No pleural effusion.  Mediastinum: No adenopathy. No pericardial effusion.  Atherosclerotic calcification of the coronary arteries.  Vascular: No aneurysm or dissection.  Bones: Unremarkable.  Soft tissues: Unremarkable.    Abdomen/Pelvis:  Liver: No focal lesion.  Gallbladder: No calcified stone.  Pancreas: Within normal limits.  Spleen: Within normal limits.  Kidney: Mild left perinephric stranding, heterogenous parenchymal enhancement, mild hydronephrosis and hydroureter with periureteral fat stranding. Stable 4.3 x 3.9 cm simple right renal cyst for which no follow-up imaging is recommended.  Adrenal glands: Within normal limits.  Vascular structures: Abdominal aortic aneurysm measuring 3.7 x 2.8 cm..    Bowel: Small hiatal hernia. Diverticulosis without inflammatory  changes. No bowel distention.  Appendix: Status post appendectomy.  Peritoneum: No free fluid or free air.    Lymph Nodes: No lymphadenopathy.  Reproductive: Prostate is enlarged.  Urinary bladder: Distended bladder and diverticulum with stranding of the wall, containing foci of gas within the bladder and persistent bladder wall thickening in the left.    Osseous structures: Unremarkable.  Soft tissues: Incompletely imaged lipoma measuring 5.5 x 3.4 cm in the left upper medial thigh musculature.        IMPRESSION:    Chest:  1. No acute abnormality.  1. Chronic findings as above.    Abdomen and pelvis:  1. Mild left perinephric stranding, heterogenous parenchymal enhancement of the kidney, mild hydronephrosis and hydroureter with periureteral fat stranding extending to the bladder and without obstructing ureteral stone. Findings are suspicious for pyelonephritis and pyelitis.  2. Distended bladder and diverticulum with stranding of the wall, containing foci of gas within the bladder and persistent bladder wall thickening in the left. Findings are suspicious for cystitis. The persistent bladder wall thickening of the left is suspicious for mass. Recommend Urology consult.  3. A 3.7 cm infrarenal abdominal aneurysm. Recommend follow-up every 2 years. Reference: J Am Donato Radiol 2013;10:789-794.  4. Additional chronic findings as above.    Electronically signed by:  Kai Barba MD  03/07/2024 10:54 PM CST Workstation: [id number]                                     X-Ray Chest AP Portable (In process)                      Medications   vancomycin (VANCOCIN) 2,000 mg in dextrose 5 % (D5W) 500 mL IVPB (2,000 mg Intravenous New Bag 3/8/24 0028)   aspirin EC tablet 81 mg (has no administration in time range)   metoprolol succinate (TOPROL-XL) 24 hr tablet 25 mg (has no administration in time range)   oxyCODONE-acetaminophen 5-325 mg per tablet 1 tablet (has no administration in time range)   sodium chloride 0.9% flush  10 mL (has no administration in time range)   melatonin tablet 6 mg (has no administration in time range)   ondansetron injection 4 mg (has no administration in time range)   senna-docusate 8.6-50 mg per tablet 1 tablet (has no administration in time range)   aluminum-magnesium hydroxide-simethicone 200-200-20 mg/5 mL suspension 30 mL (has no administration in time range)   acetaminophen tablet 650 mg (has no administration in time range)   HYDROcodone-acetaminophen 5-325 mg per tablet 1 tablet (has no administration in time range)   naloxone 0.4 mg/mL injection 0.02 mg (has no administration in time range)   potassium bicarbonate disintegrating tablet 50 mEq (has no administration in time range)   potassium bicarbonate disintegrating tablet 35 mEq (has no administration in time range)   potassium bicarbonate disintegrating tablet 60 mEq (has no administration in time range)   magnesium oxide tablet 800 mg (has no administration in time range)   magnesium oxide tablet 800 mg (has no administration in time range)   potassium, sodium phosphates 280-160-250 mg packet 2 packet (has no administration in time range)   potassium, sodium phosphates 280-160-250 mg packet 2 packet (has no administration in time range)   potassium, sodium phosphates 280-160-250 mg packet 2 packet (has no administration in time range)   dextrose 50% injection 12.5 g (has no administration in time range)   dextrose 50% injection 25 g (has no administration in time range)   enoxaparin injection 40 mg (has no administration in time range)   glucose chewable tablet 16 g (has no administration in time range)   glucose chewable tablet 24 g (has no administration in time range)   glucagon (human recombinant) injection 1 mg (has no administration in time range)   insulin aspart U-100 pen 0-5 Units (has no administration in time range)   vancomycin - pharmacy to dose (has no administration in time range)   piperacillin-tazobactam 3.375 g in dextrose 5 %  100 mL IVPB (ready to mix) (has no administration in time range)   0.9%  NaCl infusion (has no administration in time range)   vancomycin (VANCOCIN) 2,000 mg in dextrose 5 % (D5W) 500 mL IVPB (2,000 mg Intravenous Trough Due As Scheduled Before Dose 3/11/24 0100)   lactated ringers bolus 1,000 mL (0 mLs Intravenous Stopped 3/8/24 0028)   piperacillin-tazobactam 4.5 g in dextrose 5 % 100 mL IVPB (ready to mix) (0 g Intravenous Stopped 3/7/24 2259)   iohexoL (OMNIPAQUE 300) injection 100 mL (100 mLs Intravenous Given 3/7/24 2206)   oxyCODONE-acetaminophen 5-325 mg per tablet 1 tablet (1 tablet Oral Given 3/7/24 2333)     Medical Decision Making  Fever chills at home, afebrile here but he just took Tylenol normotensive not tachycardic satting well on room air, on exam he has mild left lower quadrant tenderness to palpation without rebound or guarding, his EKG on my independent interpretation, proximally 72 beats per minute, no STEMI.  He is well-appearing on exam.     Amount and/or Complexity of Data Reviewed  Independent Historian: spouse     Details: Reports it has not typically have fever and chills with BCG  External Data Reviewed: notes.     Details: Prior admission for UTI  Labs: ordered.  Radiology: ordered and independent interpretation performed. Decision-making details documented in ED Course.     Details: No free air on CTA  ECG/medicine tests: ordered and independent interpretation performed. Decision-making details documented in ED Course.  Discussion of management or test interpretation with external provider(s): Discussed with hospital medicine for admission his CT looks like pyelo, his urine looks infected    Risk  Prescription drug management.  Decision regarding hospitalization.  Diagnosis or treatment significantly limited by social determinants of health.    Cedrick Lopez, HO4  [id number]  Chart dictated using voice recognition software, possible dictation errors present            Attending Attestation:  "  Physician Attestation Statement for Resident:  As the supervising MD   I agree with the above history.  -:   As the supervising MD I agree with the above PE.     As the supervising MD I agree with the above treatment, course, plan, and disposition.                    ED Course as of 03/08/24 0210   Thu Mar 07, 2024   2205 Chest x-ray on my independent interpretation no focal consolidation or pneumothorax [IC]      ED Course User Index  [IC] Cedrick Lopez MD    Sepsis Perfusion Assessment: "I attest a sepsis perfusion exam was performed within 6 hours of sepsis, severe sepsis, or septic shock presentation, following fluid resuscitation."                      Clinical Impression:  Final diagnoses:  [R50.9] Fever  [N12] Pyelonephritis (Primary)          ED Disposition Condition    Admit Stable                Cedrick Lopez MD  Resident  03/07/24 [id number]       Luis Antonio Lane MD  03/08/24 0210    "

## 2024-03-08 NOTE — ASSESSMENT & PLAN NOTE
>>ASSESSMENT AND PLAN FOR SEPSIS WITHOUT ACUTE ORGAN DYSFUNCTION WRITTEN ON 3/7/2024 11:39 PM BY GREGORY SHIN MD    Fever at home of 102, and leukocytosis on presentation.  CT abdomen/pelvis showed findings suspicious for pyelonephritis, pyelitis and cystitis.  UA and urine cx currently in process.   Blood cx collected.   Pt was started on zosyn and vanco which I will continue.   Iv fluids for hydration.

## 2024-03-08 NOTE — PROGRESS NOTES
VANCOMYCIN PHARMACOKINETIC NOTE:  Vancomycin Day # 1    Objective/Assessment:    Diagnosis/Indication for Vancomycin:Urinary Tract Infection      78 y.o., male; Actual Body Weight = 86.6 kg (191 lb).    The patient has the following labs:  3/8/2024 Estimated Creatinine Clearance: 63.8 mL/min (based on SCr of 1 mg/dL). Lab Results   Component Value Date    BUN 19 03/07/2024     Lab Results   Component Value Date    WBC 14.58 (H) 03/07/2024          Plan:  Adjust vancomycin dose and/or frequency based on the patient's actual weight and renal function:  Initiate Vancomycin 2000 mg IV every 24 hours.  Orders have been entered into patient's chart.        Vancomycin trough level has been ordered for 01:00 on 3/11.    Pharmacy will manage vancomycin therapy, monitor serum vancomycin levels, monitor renal function and adjust regimen as necessary.    Thank you for allowing us to participate in this patient's care.     Alka Pérez 3/8/2024   Department of Pharmacy  Ext [phone]

## 2024-03-09 LAB
ALBUMIN SERPL BCP-MCNC: 3.4 G/DL (ref 3.5–5.2)
ALP SERPL-CCNC: 74 U/L (ref 55–135)
ALT SERPL W/O P-5'-P-CCNC: 8 U/L (ref 10–44)
ANION GAP SERPL CALC-SCNC: 10 MMOL/L (ref 8–16)
AST SERPL-CCNC: 10 U/L (ref 10–40)
BASOPHILS # BLD AUTO: 0.07 K/UL (ref 0–0.2)
BASOPHILS NFR BLD: 0.7 % (ref 0–1.9)
BILIRUB SERPL-MCNC: 0.5 MG/DL (ref 0.1–1)
BUN SERPL-MCNC: 15 MG/DL (ref 8–23)
CALCIUM SERPL-MCNC: 8.6 MG/DL (ref 8.7–10.5)
CHLORIDE SERPL-SCNC: 108 MMOL/L (ref 95–110)
CO2 SERPL-SCNC: 21 MMOL/L (ref 23–29)
CREAT SERPL-MCNC: 0.9 MG/DL (ref 0.5–1.4)
DIFFERENTIAL METHOD BLD: ABNORMAL
EOSINOPHIL # BLD AUTO: 0.3 K/UL (ref 0–0.5)
EOSINOPHIL NFR BLD: 3 % (ref 0–8)
ERYTHROCYTE [DISTWIDTH] IN BLOOD BY AUTOMATED COUNT: 14.2 % (ref 11.5–14.5)
EST. GFR  (NO RACE VARIABLE): >60 ML/MIN/1.73 M^2
GLUCOSE SERPL-MCNC: 116 MG/DL (ref 70–110)
GLUCOSE SERPL-MCNC: 129 MG/DL (ref 70–110)
GLUCOSE SERPL-MCNC: 138 MG/DL (ref 70–110)
GLUCOSE SERPL-MCNC: 150 MG/DL (ref 70–110)
HCT VFR BLD AUTO: 39.5 % (ref 40–54)
HGB BLD-MCNC: 12.9 G/DL (ref 14–18)
IMM GRANULOCYTES # BLD AUTO: 0.04 K/UL (ref 0–0.04)
IMM GRANULOCYTES NFR BLD AUTO: 0.4 % (ref 0–0.5)
LYMPHOCYTES # BLD AUTO: 1.1 K/UL (ref 1–4.8)
LYMPHOCYTES NFR BLD: 11.9 % (ref 18–48)
MAGNESIUM SERPL-MCNC: 2 MG/DL (ref 1.6–2.6)
MCH RBC QN AUTO: 28.2 PG (ref 27–31)
MCHC RBC AUTO-ENTMCNC: 32.7 G/DL (ref 32–36)
MCV RBC AUTO: 86 FL (ref 82–98)
MONOCYTES # BLD AUTO: 1 K/UL (ref 0.3–1)
MONOCYTES NFR BLD: 10.1 % (ref 4–15)
NEUTROPHILS # BLD AUTO: 7 K/UL (ref 1.8–7.7)
NEUTROPHILS NFR BLD: 73.9 % (ref 38–73)
NRBC BLD-RTO: 0 /100 WBC
OHS QRS DURATION: 86 MS
OHS QTC CALCULATION: 422 MS
PLATELET # BLD AUTO: 279 K/UL (ref 150–450)
PMV BLD AUTO: 10.2 FL (ref 9.2–12.9)
POTASSIUM SERPL-SCNC: 3.7 MMOL/L (ref 3.5–5.1)
PROT SERPL-MCNC: 6.4 G/DL (ref 6–8.4)
RBC # BLD AUTO: 4.57 M/UL (ref 4.6–6.2)
SODIUM SERPL-SCNC: 139 MMOL/L (ref 136–145)
WBC # BLD AUTO: 9.41 K/UL (ref 3.9–12.7)

## 2024-03-09 PROCEDURE — 12000002 HC ACUTE/MED SURGE SEMI-PRIVATE ROOM

## 2024-03-09 PROCEDURE — 63600175 PHARM REV CODE 636 W HCPCS: Performed by: INTERNAL MEDICINE

## 2024-03-09 PROCEDURE — 80053 COMPREHEN METABOLIC PANEL: CPT | Performed by: HOSPITALIST

## 2024-03-09 PROCEDURE — 25000003 PHARM REV CODE 250: Performed by: HOSPITALIST

## 2024-03-09 PROCEDURE — 85025 COMPLETE CBC W/AUTO DIFF WBC: CPT | Performed by: HOSPITALIST

## 2024-03-09 PROCEDURE — 36415 COLL VENOUS BLD VENIPUNCTURE: CPT | Performed by: HOSPITALIST

## 2024-03-09 PROCEDURE — 83735 ASSAY OF MAGNESIUM: CPT | Performed by: HOSPITALIST

## 2024-03-09 PROCEDURE — 99233 SBSQ HOSP IP/OBS HIGH 50: CPT | Mod: ,,, | Performed by: INTERNAL MEDICINE

## 2024-03-09 PROCEDURE — 63600175 PHARM REV CODE 636 W HCPCS: Performed by: HOSPITALIST

## 2024-03-09 PROCEDURE — 25000003 PHARM REV CODE 250: Performed by: NURSE PRACTITIONER

## 2024-03-09 PROCEDURE — 25000003 PHARM REV CODE 250: Performed by: INTERNAL MEDICINE

## 2024-03-09 RX ADMIN — ATORVASTATIN CALCIUM 80 MG: 40 TABLET, FILM COATED ORAL at 09:03

## 2024-03-09 RX ADMIN — METOPROLOL SUCCINATE 25 MG: 25 TABLET, EXTENDED RELEASE ORAL at 09:03

## 2024-03-09 RX ADMIN — OXYCODONE HYDROCHLORIDE AND ACETAMINOPHEN 1 TABLET: 5; 325 TABLET ORAL at 07:03

## 2024-03-09 RX ADMIN — SENNOSIDES AND DOCUSATE SODIUM 1 TABLET: 8.6; 5 TABLET ORAL at 09:03

## 2024-03-09 RX ADMIN — ENOXAPARIN SODIUM 40 MG: 100 INJECTION SUBCUTANEOUS at 05:03

## 2024-03-09 RX ADMIN — AMLODIPINE BESYLATE 5 MG: 5 TABLET ORAL at 09:03

## 2024-03-09 RX ADMIN — Medication 4 TABLET: at 05:03

## 2024-03-09 RX ADMIN — Medication 4 TABLET: at 09:03

## 2024-03-09 RX ADMIN — Medication 6 MG: at 09:03

## 2024-03-09 RX ADMIN — OXYCODONE HYDROCHLORIDE AND ACETAMINOPHEN 1 TABLET: 5; 325 TABLET ORAL at 06:03

## 2024-03-09 RX ADMIN — PIPERACILLIN SODIUM AND TAZOBACTAM SODIUM 3.38 G: 3; .375 INJECTION, POWDER, LYOPHILIZED, FOR SOLUTION INTRAVENOUS at 05:03

## 2024-03-09 RX ADMIN — CEFTRIAXONE SODIUM 2 G: 2 INJECTION, POWDER, FOR SOLUTION INTRAMUSCULAR; INTRAVENOUS at 04:03

## 2024-03-09 RX ADMIN — OXYCODONE HYDROCHLORIDE AND ACETAMINOPHEN 1 TABLET: 5; 325 TABLET ORAL at 12:03

## 2024-03-09 RX ADMIN — Medication 4 TABLET: at 12:03

## 2024-03-09 RX ADMIN — ASPIRIN 81 MG: 81 TABLET, COATED ORAL at 09:03

## 2024-03-09 NOTE — ASSESSMENT & PLAN NOTE
>>ASSESSMENT AND PLAN FOR SEPSIS WITHOUT ACUTE ORGAN DYSFUNCTION WRITTEN ON 3/9/2024  3:28 PM BY KULWANT BARNHART, NP    Fever at home of 102, and leukocytosis on presentation.  CT abdomen/pelvis showed findings suspicious for pyelonephritis, pyelitis and cystitis.  UA is positive. Urine cx currently in process.   Blood cx collected.   Pt was started on zosyn and vanco which I will continue. Will consult ID.  Iv fluids for hydration.     3/9 - Improved today, cont abx and await final cultures and ID recs

## 2024-03-09 NOTE — SUBJECTIVE & OBJECTIVE
Interval History:  Notes reviewed, no acute events overnight.  Patient resting comfortably in bed.  He states he feels much better today.  Reports still having mild dysuria but states is normal after his bladder installations.  His left flank pain is resolved.  He is eager for discharge.  Advised patient will need to await final ID recommendations.  Will continue to monitor closely.      Review of Systems   Constitutional:  Negative for activity change, chills and fatigue.   HENT:  Negative for sore throat.    Eyes:  Negative for visual disturbance.   Respiratory:  Negative for cough and shortness of breath.    Cardiovascular:  Negative for chest pain, palpitations and leg swelling.   Gastrointestinal:  Negative for abdominal distention, abdominal pain, nausea and vomiting.   Endocrine: Negative for cold intolerance and heat intolerance.   Genitourinary:  Positive for dysuria. Negative for flank pain, frequency and urgency.   Musculoskeletal:  Positive for back pain (chronic) and myalgias.   Skin:  Negative for rash.   Neurological:  Negative for syncope and headaches.   Psychiatric/Behavioral:  Negative for confusion and hallucinations.    All other systems reviewed and are negative.    Objective:     Vital Signs (Most Recent):  Temp: 97.6 °F (36.4 °C) (03/09/24 1107)  Pulse: (!) 55 (03/09/24 1107)  Resp: 18 (03/09/24 1245)  BP: 125/77 (03/09/24 1107)  SpO2: 98 % (03/09/24 1107) Vital Signs (24h Range):  Temp:  [97.2 °F (36.2 °C)-98.3 °F (36.8 °C)] 97.6 °F (36.4 °C)  Pulse:  [55-68] 55  Resp:  [17-20] 18  SpO2:  [94 %-98 %] 98 %  BP: (108-131)/(67-81) 125/77     Weight: 86.6 kg (190 lb 14.7 oz)  Body mass index is 30.81 kg/m².    Intake/Output Summary (Last 24 hours) at 3/9/2024 1521  Last data filed at 3/9/2024 1108  Gross per 24 hour   Intake 680 ml   Output 1650 ml   Net -970 ml           Physical Exam  Vitals and nursing note reviewed.   Constitutional:       General: He is not in acute distress.      Appearance: Normal appearance. He is well-developed. He is not ill-appearing or diaphoretic.   HENT:      Head: Normocephalic and atraumatic.      Right Ear: External ear normal.      Left Ear: External ear normal.      Nose: Nose normal. No congestion or rhinorrhea.      Mouth/Throat:      Mouth: Mucous membranes are dry.      Pharynx: Oropharynx is clear. No oropharyngeal exudate or posterior oropharyngeal erythema.   Eyes:      General: No scleral icterus.     Extraocular Movements: Extraocular movements intact.      Conjunctiva/sclera: Conjunctivae normal.      Pupils: Pupils are equal, round, and reactive to light.   Neck:      Vascular: No JVD.   Cardiovascular:      Rate and Rhythm: Normal rate and regular rhythm.      Pulses: Normal pulses.      Heart sounds: Normal heart sounds. No murmur heard.  Pulmonary:      Effort: Pulmonary effort is normal. No respiratory distress.      Breath sounds: Normal breath sounds. No stridor. No wheezing, rhonchi or rales.   Abdominal:      General: Abdomen is flat. Bowel sounds are normal. There is no distension.      Palpations: Abdomen is soft.      Tenderness: There is no abdominal tenderness.   Musculoskeletal:         General: No swelling or tenderness. Normal range of motion.      Cervical back: Normal range of motion and neck supple.   Skin:     General: Skin is warm and dry.      Capillary Refill: Capillary refill takes 2 to 3 seconds.      Coloration: Skin is not jaundiced or pale.      Findings: No erythema.   Neurological:      General: No focal deficit present.      Mental Status: He is alert and oriented to person, place, and time.      Cranial Nerves: No cranial nerve deficit.      Sensory: No sensory deficit.   Psychiatric:         Mood and Affect: Mood normal.         Behavior: Behavior normal.         Thought Content: Thought content normal.             Significant Labs: All pertinent labs within the past 24 hours have been reviewed.  CBC:   Recent Labs    Lab 03/07/24 2040 03/08/24  0702 03/09/24  0532   WBC 14.58* 13.92* 9.41   HGB 15.2 13.4* 12.9*   HCT 46.1 41.5 39.5*    297 279       CMP:   Recent Labs   Lab 03/07/24 2040 03/08/24  0702 03/09/24  0532    135* 139   K 3.8 3.8 3.7    107 108   CO2 19* 21* 21*   * 124* 129*   BUN 19 17 15   CREATININE 1.0 1.0 0.9   CALCIUM 9.1 8.7 8.6*   PROT 7.4 6.4 6.4   ALBUMIN 4.0 3.5 3.4*   BILITOT 0.8 0.8 0.5   ALKPHOS 98 82 74   AST 10 9* 10   ALT 10 8* 8*   ANIONGAP 10 7* 10         Significant Imaging: I have reviewed all pertinent imaging results/findings within the past 24 hours.

## 2024-03-09 NOTE — HOSPITAL COURSE
Patient was monitored closely during his hospital stay.  CT abdomen pelvis revealed cystitis with left pyelonephritis.  He was initiated on IV vanc and Zosyn.  ID was consulted.  Labs were trended and remained stable.  Urine and blood cultures negative to date.  Patient remained afebrile. Pts symptoms resolved and he felt well and desired to go home. Cultures remained neg and ID rec to complete oral abx outpt with omnicef x 5 days. Pt verbalized understanding of discharge instructions and return precautions.

## 2024-03-09 NOTE — PROGRESS NOTES
[facility]  Department of Infectious Disease  Progress Note        PATIENT NAME: Arcadio Begum  MRN: [id number]  TODAY'S DATE: 03/09/2024  ADMIT DATE: 3/7/2024    PRINCIPLE PROBLEM: Sepsis without acute organ dysfunction    INTERVAL HISTORY      03/09/2024:  No acute issues overnight.  Improving.  Leukocytosis resolved.  Afebrile.  Blood and Urine cultures so far negative.      Antibiotics (From admission, onward)      Start     Stop Route Frequency Ordered    03/08/24 0600  piperacillin-tazobactam 3.375 g in dextrose 5 % 100 mL IVPB (ready to mix)        Note to Pharmacy: Ht:    Wt: 86.6 kg (191 lb)  Estimated Creatinine Clearance: 63.8 mL/min (based on SCr of 1 mg/dL).  Body mass index is 30.83 kg/m².    -- IV Every 8 hours (non-standard times) 03/08/24 0008            Antifungals (From admission, onward)      None             Antivirals (From admission, onward)      None            ASSESSMENT and PLAN      Acute Pyelonephritis. Follow cultures. Keep in mind possibility of BCG infection.  Switch Zosyn to ceftriaxone for now.  Anticipate discharge home tomorrow on oral antibiotics if continues to improve.      2.   Cystitis. May be reactive from BCG vs infection. Symptomatic care     3.   Bladder Cancer. On BCG therapy.    Discussed with patient and his wife at bedside.  Their questions answered.    Recommendations:  Follow urine culture    Thank you for this consult. Please send Epic secure chat with any questions.      SUBJECTIVE    He has [facility] He has had infectious complications including GBS UTI in February 2024. Also previous Pseudomonas UTI August 2023. Presented to ER 3/7/24 with fever, dydruria and Left flank pain. CT abnormal with mild left hydronephrosis, perinephric stranding and thickened bladder wall consistent with Pyelonephritis and Cystitis. UA abnormal with >100 WBC.     Review of Systems  Constitutional:  Denies fevers, chills, night  sweats, loss of appetite.  HEENT: Denies visual changes, ear pain, sinus congestion, mouth pain or trouble swallowing, sore throat or dental pain.  Neck: Denies neck pain or lumps.  Respiratory: Denies shortness of breath, coughing, wheezing or hemoptysis.  Cardiovascular:  Denies chest pain, palpitations or edema.  Gastrointestinal: Denies  nausea, vomiting, constipation or diarrhea.  Genitourinary:  Denies dysuria, frequency, urgency or hematuria   Musculoskeletal:  Denies joint pain or swelling, difficulty walking.    Skin:  Denies rash or itching.  VAD:    Neurologic:  Denies motor sensory loss, headaches or dizziness.    Psychiatric:  Denies changes in mood or behavior.       OBJECTIVE     Temp:  [97.2 °F (36.2 °C)-98.3 °F (36.8 °C)] 97.7 °F (36.5 °C)  Pulse:  [60-70] 68  Resp:  [17-20] 18  SpO2:  [94 %-99 %] 95 %  BP: (108-131)/(57-81) 131/81    Physical Exam  General:  Elderly man lying quietly in bed in no acute distress at this time.  Eyes: Eyes with no icterus or injection. Vision grossly normal  Ears: Hearing grossly normal.  Nose: Nares patent  Mouth: Moist mucous membranes, dentition is good. No ulcerations, erythema or exudates.  Neck: Supple, no tenderness to palpation.  Cardiovascular: Regular rate and rhythm, no murmurs, no edema.    Respiratory:  Clear to auscultation bilaterally, no tachypnea or increased work of breathing.  Gastrointestinal:  Soft with active bowel sounds, no tenderness to palpation, no distention.  Genitourinary:  No suprapubic tenderness.  Musculoskeletal:  Moves all extremities with good strength.    Skin:  Warm and dry, no obvious rashes.    Neuro:   Oriented, conversant, follows commands.  Psych: Good mood, normal affect.     WOUNDS:  None  VAD:  None  ISOLATION:  None    CBC LAST 7 DAYS  Recent Labs   Lab 03/07/24  2040 03/08/24  0702 03/09/24  0532   WBC 14.58* 13.92* 9.41   RBC 5.41 4.91 4.57*   HGB 15.2 13.4* 12.9*   HCT 46.1 41.5 39.5*   MCV 85 85 86   MCH 28.1 27.3  "28.2   MCHC 33.0 32.3 32.7   RDW 14.0 14.1 14.2    297 279   MPV 10.3 10.5 10.2   GRAN 89.3*  13.0* 84.5*  11.8* 73.9*  7.0   LYMPH 3.9*  0.6* 6.3*  0.9* 11.9*  1.1   MONO 5.4  0.8 7.3  1.0 10.1  1.0   BASO 0.09 0.06 0.07   NRBC 0 0 0       CHEMISTRY LAST 7 DAYS  Recent Labs   Lab 03/07/24 2040 03/08/24 0702 03/09/24  0532    135* 139   K 3.8 3.8 3.7    107 108   CO2 19* 21* 21*   ANIONGAP 10 7* 10   BUN 19 17 15   CREATININE 1.0 1.0 0.9   * 124* 129*   CALCIUM 9.1 8.7 8.6*   MG  --  1.8 2.0   ALBUMIN 4.0 3.5 3.4*   PROT 7.4 6.4 6.4   ALKPHOS 98 82 74   ALT 10 8* 8*   AST 10 9* 10   BILITOT 0.8 0.8 0.5       Estimated Creatinine Clearance: 70.9 mL/min (based on SCr of 0.9 mg/dL).    INFLAMMATORY/PROCAL  LAST 7 DAYS  Recent Labs   Lab 03/07/24 2152 03/08/24  1308   PROCAL 0.327 0.425   CRP  --  158.0*     No results found for: "ESR"  CRP   Date Value Ref Range Status   03/08/2024 158.0 (H) 0.0 - 8.2 mg/L Final   09/05/2023 6.6 0.0 - 8.2 mg/L Final       PRIOR  MICROBIOLOGY:  Reviewed    LAST 7 DAYS MICROBIOLOGY   Microbiology Results (last 7 days)       Procedure Component Value Units Date/Time    Urine culture [[id number]] Collected: 03/07/24 2259    Order Status: Completed Specimen: Urine Updated: 03/09/24 0716     Urine Culture, Routine No growth to date    Narrative:      Specimen Source->Urine    Blood culture x two cultures. Draw prior to antibiotics. [[id number]] Collected: 03/07/24 2042    Order Status: Completed Specimen: Blood from Peripheral, Antecubital, Right Updated: 03/08/24 2232     Blood Culture, Routine No Growth to date      No Growth to date    Narrative:      Aerobic and anaerobic    Blood culture x two cultures. Draw prior to antibiotics. [[id number]] Collected: 03/07/24 2040    Order Status: Completed Specimen: Blood from Peripheral, Antecubital, Left Updated: 03/08/24 2232     Blood Culture, Routine No Growth to date      No Growth to date    Narrative:   "    Aerobic and anaerobic    AFB Culture & Smear [[id number]] Collected: 03/08/24 1609    Order Status: Sent Specimen: Urine Updated: 03/08/24 1651    AFB Culture & Smear [[id number]]     Order Status: Sent Specimen: Blood     AFB Culture & Smear [[id number]]     Order Status: Sent Specimen: Blood     MRSA Screen by PCR [[id number]] Collected: 03/08/24 1326    Order Status: Completed Specimen: Nasopharyngeal Swab from Nasal Updated: 03/08/24 1502     MRSA SCREEN BY PCR Negative            SUSCEPTIBILITY  Susceptibility data from last 90 days.  Collected Specimen Info Organism   02/22/24 Urine Streptococcus agalactiae (Group B)   02/22/24 Blood No growth after 5 days.   02/22/24 Blood from Peripheral, Antecubital, Right No growth after 5 days.       CURRENT/PREVIOUS VISIT EKG  Results for orders placed or performed during the hospital encounter of 03/07/24   EKG 12-lead    Collection Time: 03/07/24  9:07 PM   Result Value Ref Range    QRS Duration 86 ms    OHS QTC Calculation 422 ms    Narrative    Test Reason : R50.9,    Vent. Rate : 075 BPM     Atrial Rate : 075 BPM     P-R Int : 154 ms          QRS Dur : 086 ms      QT Int : 378 ms       P-R-T Axes : 054 -35 043 degrees     QTc Int : 422 ms    Normal sinus rhythm  Possible Left atrial enlargement  Left axis deviation  Abnormal ECG  When compared with ECG of 22-FEB-2024 22:50,  No significant change was found    Referred By: [email or web address]   SELF           Confirmed By:        Significant Labs: All pertinent labs within the past 24 hours have been reviewed.     Significant Imaging: I have reviewed all relevant and available imaging results/findings within the past 24 hours.    Chidi Gilman MD  Date of Service: 03/09/2024  9:48 AM

## 2024-03-09 NOTE — ASSESSMENT & PLAN NOTE
CT reviewed  Urine and blood cultures NGTD  Continue zosyn for now  ID consult reviewed and appreciated

## 2024-03-09 NOTE — ASSESSMENT & PLAN NOTE
Fever at home of 102, and leukocytosis on presentation.  CT abdomen/pelvis showed findings suspicious for pyelonephritis, pyelitis and cystitis.  UA is positive. Urine cx currently in process.   Blood cx collected.   Pt was started on zosyn and vanco which I will continue. Will consult ID.  Iv fluids for hydration.     3/9 - Improved today, cont abx and await final cultures and ID recs

## 2024-03-09 NOTE — PROGRESS NOTES
[facility] Medicine  Progress Note    Patient Name: Arcadio Begum  MRN: [id number]  Patient Class: IP- Inpatient   Admission Date: 3/7/2024  Length of Stay: 2 days  Attending Physician: Antonio Avila MD  Primary Care Provider: [facility]        Subjective:     Principal Problem:Sepsis without acute organ dysfunction        HPI:  79 yo male with PMH of bladder cancer on BCG treatment, last of which was today, who presented with generalized weakness and fever. Per pt, he got home today after the BCG treatment and started to have generalized weakness and fatigue. Soon after, he developed a fever of 101.2. Wife gave pt 1000 mg of tylenol and [facility], and they recommended to go to the ER for eval. Pt has dysuria which is normal to him after getting BCG treatment per pt. He also usually has frequency and urgency. ROS is positive for left flank pain for 2 week.     On arrival to the ER, pt was afebrile, with bp of 113/75, and HR of 75. Labs showed leukocytosis of 14.58. CMP, lactic acid and procal are wnl. Covid/flu negative. CT Chest/abdomen/pelvis showed Mild left perinephric stranding, heterogenous parenchymal enhancement of the kidney, mild hydronephrosis and hydroureter with periureteral fat stranding extending to the bladder and without obstructing ureteral stone. Findings are suspicious for pyelonephritis and pyelitis. Also findings suspicious for cystitis. Urine and blood cx collected. Pt was given zosyn and vanco and will be admitted to medicine for further care.    Overview/Hospital Course:  No notes on file    Interval History:  Notes reviewed, no acute events overnight.  Patient resting comfortably in bed.  He states he feels much better today.  Reports still having mild dysuria but states is normal after his bladder installations.  His left flank pain is resolved.  He is eager for discharge.  Advised patient will need to await final ID recommendations.   Will continue to monitor closely.      Review of Systems   Constitutional:  Negative for activity change, chills and fatigue.   HENT:  Negative for sore throat.    Eyes:  Negative for visual disturbance.   Respiratory:  Negative for cough and shortness of breath.    Cardiovascular:  Negative for chest pain, palpitations and leg swelling.   Gastrointestinal:  Negative for abdominal distention, abdominal pain, nausea and vomiting.   Endocrine: Negative for cold intolerance and heat intolerance.   Genitourinary:  Positive for dysuria. Negative for flank pain, frequency and urgency.   Musculoskeletal:  Positive for back pain (chronic) and myalgias.   Skin:  Negative for rash.   Neurological:  Negative for syncope and headaches.   Psychiatric/Behavioral:  Negative for confusion and hallucinations.    All other systems reviewed and are negative.    Objective:     Vital Signs (Most Recent):  Temp: 97.6 °F (36.4 °C) (03/09/24 1107)  Pulse: (!) 55 (03/09/24 1107)  Resp: 18 (03/09/24 1245)  BP: 125/77 (03/09/24 1107)  SpO2: 98 % (03/09/24 1107) Vital Signs (24h Range):  Temp:  [97.2 °F (36.2 °C)-98.3 °F (36.8 °C)] 97.6 °F (36.4 °C)  Pulse:  [55-68] 55  Resp:  [17-20] 18  SpO2:  [94 %-98 %] 98 %  BP: (108-131)/(67-81) 125/77     Weight: 86.6 kg (190 lb 14.7 oz)  Body mass index is 30.81 kg/m².    Intake/Output Summary (Last 24 hours) at 3/9/2024 1521  Last data filed at 3/9/2024 1108  Gross per 24 hour   Intake 680 ml   Output 1650 ml   Net -970 ml           Physical Exam  Vitals and nursing note reviewed.   Constitutional:       General: He is not in acute distress.     Appearance: Normal appearance. He is well-developed. He is not ill-appearing or diaphoretic.   HENT:      Head: Normocephalic and atraumatic.      Right Ear: External ear normal.      Left Ear: External ear normal.      Nose: Nose normal. No congestion or rhinorrhea.      Mouth/Throat:      Mouth: Mucous membranes are dry.      Pharynx: Oropharynx is clear. No  oropharyngeal exudate or posterior oropharyngeal erythema.   Eyes:      General: No scleral icterus.     Extraocular Movements: Extraocular movements intact.      Conjunctiva/sclera: Conjunctivae normal.      Pupils: Pupils are equal, round, and reactive to light.   Neck:      Vascular: No JVD.   Cardiovascular:      Rate and Rhythm: Normal rate and regular rhythm.      Pulses: Normal pulses.      Heart sounds: Normal heart sounds. No murmur heard.  Pulmonary:      Effort: Pulmonary effort is normal. No respiratory distress.      Breath sounds: Normal breath sounds. No stridor. No wheezing, rhonchi or rales.   Abdominal:      General: Abdomen is flat. Bowel sounds are normal. There is no distension.      Palpations: Abdomen is soft.      Tenderness: There is no abdominal tenderness.   Musculoskeletal:         General: No swelling or tenderness. Normal range of motion.      Cervical back: Normal range of motion and neck supple.   Skin:     General: Skin is warm and dry.      Capillary Refill: Capillary refill takes 2 to 3 seconds.      Coloration: Skin is not jaundiced or pale.      Findings: No erythema.   Neurological:      General: No focal deficit present.      Mental Status: He is alert and oriented to person, place, and time.      Cranial Nerves: No cranial nerve deficit.      Sensory: No sensory deficit.   Psychiatric:         Mood and Affect: Mood normal.         Behavior: Behavior normal.         Thought Content: Thought content normal.             Significant Labs: All pertinent labs within the past 24 hours have been reviewed.  CBC:   Recent Labs   Lab 03/07/24 2040 03/08/24  0702 03/09/24  0532   WBC 14.58* 13.92* 9.41   HGB 15.2 13.4* 12.9*   HCT 46.1 41.5 39.5*    297 279       CMP:   Recent Labs   Lab 03/07/24 2040 03/08/24  0702 03/09/24  0532    135* 139   K 3.8 3.8 3.7    107 108   CO2 19* 21* 21*   * 124* 129*   BUN 19 17 15   CREATININE 1.0 1.0 0.9   CALCIUM 9.1 8.7  "8.6*   PROT 7.4 6.4 6.4   ALBUMIN 4.0 3.5 3.4*   BILITOT 0.8 0.8 0.5   ALKPHOS 98 82 74   AST 10 9* 10   ALT 10 8* 8*   ANIONGAP 10 7* 10         Significant Imaging: I have reviewed all pertinent imaging results/findings within the past 24 hours.    Assessment/Plan:      * Sepsis without acute organ dysfunction  Fever at home of 102, and leukocytosis on presentation.  CT abdomen/pelvis showed findings suspicious for pyelonephritis, pyelitis and cystitis.  UA is positive. Urine cx currently in process.   Blood cx collected.   Pt was started on zosyn and vanco which I will continue. Will consult ID.  Iv fluids for hydration.     3/9 - Improved today, cont abx and await final cultures and ID recs      Primary hypertension  Chronic, controlled.  Latest blood pressure and vitals reviewed-     Temp:  [97.6 °F (36.4 °C)-98.5 °F (36.9 °C)]   Pulse:  [60-90]   Resp:  [16-26]   BP: (104-119)/(57-76)   SpO2:  [94 %-99 %] .   Home meds for hypertension were reviewed and noted below-  Hypertension Medications               amLODIPine (NORVASC) 10 MG tablet Take 5 mg by mouth once daily.    metoprolol succinate (TOPROL-XL) 25 MG 24 hr tablet Take 25 mg by mouth once daily.            While in the hospital, will manage blood pressure as follows; Continue home antihypertensive regimen    Will utilize p.r.n. blood pressure medication only if patient's blood pressure greater than 180/110 and he develops symptoms such as worsening chest pain or shortness of breath.      Pyelonephritis  CT reviewed  Urine and blood cultures NGTD  Continue zosyn for now  ID consult reviewed and appreciated      Type 2 diabetes mellitus without complication, without long-term current use of insulin  Patient's FSGs are controlled on current medication regimen.  Last A1c reviewed-   Lab Results   Component Value Date    HGBA1C 8.5 (H) 03/08/2024     Most recent fingerstick glucose reviewed- No results for input(s): "POCTGLUCOSE" in the last 24 " hours.  Current correctional scale  Low  Maintain anti-hyperglycemic dose as follows-   Antihyperglycemics (From admission, onward)      Start     Stop Route Frequency Ordered    03/08/24 0107  insulin aspart U-100 pen 0-5 Units         -- SubQ Before meals & nightly PRN 03/08/24 0007          Hold Oral hypoglycemics while patient is in the hospital.      Bladder cancer  [facility].  Urology follow up outpt.      CAD (coronary artery disease)  Chronic, stable  Resume aspirin, and metoprolol.       VTE Risk Mitigation (From admission, onward)           Ordered     enoxaparin injection 40 mg  Daily        Note to Pharmacy: Ht:    Wt: 86.6 kg (191 lb)  Estimated Creatinine Clearance: 63.8 mL/min (based on SCr of 1 mg/dL).  Body mass index is 30.83 kg/m².    03/07/24 2344     IP VTE HIGH RISK PATIENT  Once         03/07/24 2344     Place sequential compression device  Until discontinued         03/07/24 2344                    Discharge Planning   KRISTAN: 3/11/2024     Code Status: Full Code   Is the patient medically ready for discharge?:     Reason for patient still in hospital (select all that apply): Laboratory test, Treatment, and Consult recommendations  Discharge Plan A: Home with family   Discharge Delays: None known at this time              Clarissa Eid NP  Department of Hospital Medicine   [facility]

## 2024-03-10 VITALS
WEIGHT: 190.94 LBS | TEMPERATURE: 98 F | DIASTOLIC BLOOD PRESSURE: 67 MMHG | OXYGEN SATURATION: 94 % | HEIGHT: 66 IN | BODY MASS INDEX: 30.69 KG/M2 | HEART RATE: 60 BPM | RESPIRATION RATE: 20 BRPM | SYSTOLIC BLOOD PRESSURE: 116 MMHG

## 2024-03-10 LAB
ALBUMIN SERPL BCP-MCNC: 3.6 G/DL (ref 3.5–5.2)
ALP SERPL-CCNC: 72 U/L (ref 55–135)
ALT SERPL W/O P-5'-P-CCNC: 9 U/L (ref 10–44)
ANION GAP SERPL CALC-SCNC: 6 MMOL/L (ref 8–16)
AST SERPL-CCNC: 9 U/L (ref 10–40)
BACTERIA UR CULT: NO GROWTH
BASOPHILS # BLD AUTO: 0.1 K/UL (ref 0–0.2)
BASOPHILS NFR BLD: 1.1 % (ref 0–1.9)
BILIRUB SERPL-MCNC: 0.4 MG/DL (ref 0.1–1)
BUN SERPL-MCNC: 16 MG/DL (ref 8–23)
CALCIUM SERPL-MCNC: 9.3 MG/DL (ref 8.7–10.5)
CHLORIDE SERPL-SCNC: 108 MMOL/L (ref 95–110)
CO2 SERPL-SCNC: 26 MMOL/L (ref 23–29)
CREAT SERPL-MCNC: 1 MG/DL (ref 0.5–1.4)
DIFFERENTIAL METHOD BLD: ABNORMAL
EOSINOPHIL # BLD AUTO: 0.3 K/UL (ref 0–0.5)
EOSINOPHIL NFR BLD: 3 % (ref 0–8)
ERYTHROCYTE [DISTWIDTH] IN BLOOD BY AUTOMATED COUNT: 14 % (ref 11.5–14.5)
EST. GFR  (NO RACE VARIABLE): >60 ML/MIN/1.73 M^2
GLUCOSE SERPL-MCNC: 110 MG/DL (ref 70–110)
GLUCOSE SERPL-MCNC: 125 MG/DL (ref 70–110)
GLUCOSE SERPL-MCNC: 159 MG/DL (ref 70–110)
HCT VFR BLD AUTO: 40.9 % (ref 40–54)
HGB BLD-MCNC: 13.2 G/DL (ref 14–18)
IMM GRANULOCYTES # BLD AUTO: 0.03 K/UL (ref 0–0.04)
IMM GRANULOCYTES NFR BLD AUTO: 0.3 % (ref 0–0.5)
LYMPHOCYTES # BLD AUTO: 1.7 K/UL (ref 1–4.8)
LYMPHOCYTES NFR BLD: 19.3 % (ref 18–48)
MAGNESIUM SERPL-MCNC: 2 MG/DL (ref 1.6–2.6)
MCH RBC QN AUTO: 28 PG (ref 27–31)
MCHC RBC AUTO-ENTMCNC: 32.3 G/DL (ref 32–36)
MCV RBC AUTO: 87 FL (ref 82–98)
MONOCYTES # BLD AUTO: 0.9 K/UL (ref 0.3–1)
MONOCYTES NFR BLD: 10.6 % (ref 4–15)
NEUTROPHILS # BLD AUTO: 5.7 K/UL (ref 1.8–7.7)
NEUTROPHILS NFR BLD: 65.7 % (ref 38–73)
NRBC BLD-RTO: 0 /100 WBC
PLATELET # BLD AUTO: 312 K/UL (ref 150–450)
PMV BLD AUTO: 10.2 FL (ref 9.2–12.9)
POTASSIUM SERPL-SCNC: 4.6 MMOL/L (ref 3.5–5.1)
PROT SERPL-MCNC: 6.9 G/DL (ref 6–8.4)
RBC # BLD AUTO: 4.72 M/UL (ref 4.6–6.2)
SODIUM SERPL-SCNC: 140 MMOL/L (ref 136–145)
WBC # BLD AUTO: 8.72 K/UL (ref 3.9–12.7)

## 2024-03-10 PROCEDURE — 36415 COLL VENOUS BLD VENIPUNCTURE: CPT | Performed by: HOSPITALIST

## 2024-03-10 PROCEDURE — 25000003 PHARM REV CODE 250: Performed by: NURSE PRACTITIONER

## 2024-03-10 PROCEDURE — 85025 COMPLETE CBC W/AUTO DIFF WBC: CPT | Performed by: HOSPITALIST

## 2024-03-10 PROCEDURE — 25000003 PHARM REV CODE 250: Performed by: HOSPITALIST

## 2024-03-10 PROCEDURE — 83735 ASSAY OF MAGNESIUM: CPT | Performed by: HOSPITALIST

## 2024-03-10 PROCEDURE — 99233 SBSQ HOSP IP/OBS HIGH 50: CPT | Mod: ,,, | Performed by: INTERNAL MEDICINE

## 2024-03-10 PROCEDURE — 80053 COMPREHEN METABOLIC PANEL: CPT | Performed by: HOSPITALIST

## 2024-03-10 RX ORDER — CEFDINIR 300 MG/1
300 CAPSULE ORAL 2 TIMES DAILY
Qty: 10 CAPSULE | Refills: 0 | Status: SHIPPED | OUTPATIENT
Start: 2024-03-10 | End: 2024-03-15

## 2024-03-10 RX ADMIN — OXYCODONE HYDROCHLORIDE AND ACETAMINOPHEN 1 TABLET: 5; 325 TABLET ORAL at 12:03

## 2024-03-10 RX ADMIN — OXYCODONE HYDROCHLORIDE AND ACETAMINOPHEN 1 TABLET: 5; 325 TABLET ORAL at 06:03

## 2024-03-10 RX ADMIN — ASPIRIN 81 MG: 81 TABLET, COATED ORAL at 08:03

## 2024-03-10 RX ADMIN — Medication 4 TABLET: at 08:03

## 2024-03-10 RX ADMIN — AMLODIPINE BESYLATE 5 MG: 5 TABLET ORAL at 08:03

## 2024-03-10 RX ADMIN — Medication 4 TABLET: at 12:03

## 2024-03-10 NOTE — PLAN OF CARE
Patient cleared for discharge from case management standpoint.    Follow up appointments scheduled and added to AVS.    Chart and discharge orders reviewed.  Patient discharged home with no further case management needs.                   03/10/24 1124   Final Note   Assessment Type Final Discharge Note   Anticipated Discharge Disposition Home   What phone number can be called within the next 1-3 days to see how you are doing after discharge? [phone]   Post-Acute Status   Discharge Delays None known at this time

## 2024-03-10 NOTE — PROGRESS NOTES
[facility]  Department of Infectious Disease  Progress Note        PATIENT NAME: Arcadio Begum  MRN: [id number]  TODAY'S DATE: 03/10/2024  ADMIT DATE: 3/7/2024    PRINCIPLE PROBLEM: Sepsis without acute organ dysfunction    INTERVAL HISTORY      03/09/2024:  No acute issues overnight.  Improving.  Leukocytosis resolved.  Afebrile.  Blood and Urine cultures so far negative.   03/10/2024:  He is stable.  Continues to improve.  Flank pain resolved.  Dysuria improving.  Urine cultures remain negative.      Antibiotics (From admission, onward)      Start     Stop Route Frequency Ordered    03/09/24 1430  cefTRIAXone (ROCEPHIN) 2 g in dextrose 5 % 100 mL IVPB (ready to mix)         -- IV Every 24 hours (non-standard times) 03/09/24 1323            Antifungals (From admission, onward)      None             Antivirals (From admission, onward)      None            ASSESSMENT and PLAN      Acute Pyelonephritis.  Improved.  Cultures negative.  Maybe BCG reaction.  However will complete treatment with oral Omnicef generate mg b.i.d. times 5 days.        2.   Cystitis. May be reactive from BCG vs infection. Symptomatic care     3.   Bladder Cancer. On BCG therapy.    Discussed with patient and his wife at bedside.  Their questions answered.    Recommendations:  Okay to discharge home today on oral cefdinir 300 mg b.i.d. x5 days.  He will follow up with his urologist and PCP.    Thank you for involving ID in the care of this patient.  ID service will sign off today.  Please call with questions.. Please send Epic secure chat with any questions.      SUBJECTIVE    He has bladder [facility]. He has had infectious complications including GBS UTI in February 2024. Also previous Pseudomonas UTI August 2023. Presented to ER 3/7/24 with fever, dydruria and Left flank pain. CT abnormal with mild left hydronephrosis, perinephric stranding and thickened bladder wall consistent with Pyelonephritis  and Cystitis. UA abnormal with >100 WBC.     Review of Systems  Constitutional:  Denies fevers, chills, night sweats, loss of appetite.  HEENT: Denies visual changes, ear pain, sinus congestion, mouth pain or trouble swallowing, sore throat or dental pain.  Neck: Denies neck pain or lumps.  Respiratory: Denies shortness of breath, coughing, wheezing or hemoptysis.  Cardiovascular:  Denies chest pain, palpitations or edema.  Gastrointestinal: Denies  nausea, vomiting, constipation or diarrhea.  Genitourinary:  Denies dysuria, frequency, urgency or hematuria   Musculoskeletal:  Denies joint pain or swelling, difficulty walking.    Skin:  Denies rash or itching.  VAD:    Neurologic:  Denies motor sensory loss, headaches or dizziness.    Psychiatric:  Denies changes in mood or behavior.       OBJECTIVE     Temp:  [97.6 °F (36.4 °C)-98.2 °F (36.8 °C)] 98.2 °F (36.8 °C)  Pulse:  [55-68] 68  Resp:  [18-20] 18  SpO2:  [92 %-98 %] 92 %  BP: (119-135)/(70-82) 125/81    Physical Exam  General:  Elderly man lying quietly in bed in no acute distress at this time.  Eyes: Eyes with no icterus or injection. Vision grossly normal  Ears: Hearing grossly normal.  Nose: Nares patent  Mouth: Moist mucous membranes, dentition is good. No ulcerations, erythema or exudates.  Neck: Supple, no tenderness to palpation.  Cardiovascular: Regular rate and rhythm, no murmurs, no edema.    Respiratory:  Clear to auscultation bilaterally, no tachypnea or increased work of breathing.  Gastrointestinal:  Soft with active bowel sounds, no tenderness to palpation, no distention.  Genitourinary:  No suprapubic tenderness.  Musculoskeletal:  Moves all extremities with good strength.    Skin:  Warm and dry, no obvious rashes.    Neuro:   Oriented, conversant, follows commands.  Psych: Good mood, normal affect.     WOUNDS:  None  VAD:  None  ISOLATION:  None    CBC LAST 7 DAYS  Recent Labs   Lab 03/07/24  2040 03/08/24  0702 03/09/24  0532 03/10/24  0622  "  WBC 14.58* 13.92* 9.41 8.72   RBC 5.41 4.91 4.57* 4.72   HGB 15.2 13.4* 12.9* 13.2*   HCT 46.1 41.5 39.5* 40.9   MCV 85 85 86 87   MCH 28.1 27.3 28.2 28.0   MCHC 33.0 32.3 32.7 32.3   RDW 14.0 14.1 14.2 14.0    297 279 312   MPV 10.3 10.5 10.2 10.2   GRAN 89.3*  13.0* 84.5*  11.8* 73.9*  7.0 65.7  5.7   LYMPH 3.9*  0.6* 6.3*  0.9* 11.9*  1.1 19.3  1.7   MONO 5.4  0.8 7.3  1.0 10.1  1.0 10.6  0.9   BASO 0.09 0.06 0.07 0.10   NRBC 0 0 0 0         CHEMISTRY LAST 7 DAYS  Recent Labs   Lab 03/07/24 2040 03/08/24  0702 03/09/24  0532 03/10/24  0622    135* 139 140   K 3.8 3.8 3.7 4.6    107 108 108   CO2 19* 21* 21* 26   ANIONGAP 10 7* 10 6*   BUN 19 17 15 16   CREATININE 1.0 1.0 0.9 1.0   * 124* 129* 125*   CALCIUM 9.1 8.7 8.6* 9.3   MG  --  1.8 2.0 2.0   ALBUMIN 4.0 3.5 3.4* 3.6   PROT 7.4 6.4 6.4 6.9   ALKPHOS 98 82 74 72   ALT 10 8* 8* 9*   AST 10 9* 10 9*   BILITOT 0.8 0.8 0.5 0.4         Estimated Creatinine Clearance: 63.8 mL/min (based on SCr of 1 mg/dL).    INFLAMMATORY/PROCAL  LAST 7 DAYS  Recent Labs   Lab 03/07/24  2152 03/08/24  1308   PROCAL 0.327 0.425   CRP  --  158.0*       No results found for: "ESR"  CRP   Date Value Ref Range Status   03/08/2024 158.0 (H) 0.0 - 8.2 mg/L Final   09/05/2023 6.6 0.0 - 8.2 mg/L Final       PRIOR  MICROBIOLOGY:  Reviewed    LAST 7 DAYS MICROBIOLOGY   Microbiology Results (last 7 days)       Procedure Component Value Units Date/Time    Urine culture [[id number]] Collected: 03/07/24 2159    Order Status: Completed Specimen: Urine Updated: 03/10/24 0643     Urine Culture, Routine No growth    Narrative:      Specimen Source->Urine    Blood culture x two cultures. Draw prior to antibiotics. [[id number]] Collected: 03/07/24 2042    Order Status: Completed Specimen: Blood from Peripheral, Antecubital, Right Updated: 03/09/24 2232     Blood Culture, Routine No Growth to date      No Growth to date      No Growth to date    Narrative:      " Aerobic and anaerobic    Blood culture x two cultures. Draw prior to antibiotics. [[id number]] Collected: 03/07/24 2040    Order Status: Completed Specimen: Blood from Peripheral, Antecubital, Left Updated: 03/09/24 2232     Blood Culture, Routine No Growth to date      No Growth to date      No Growth to date    Narrative:      Aerobic and anaerobic    AFB Culture & Smear [[id number]] Collected: 03/08/24 1609    Order Status: Sent Specimen: Urine Updated: 03/08/24 1651    AFB Culture & Smear [[id number]]     Order Status: Sent Specimen: Blood     AFB Culture & Smear [[id number]]     Order Status: Sent Specimen: Blood     MRSA Screen by PCR [[id number]] Collected: 03/08/24 1326    Order Status: Completed Specimen: Nasopharyngeal Swab from Nasal Updated: 03/08/24 1502     MRSA SCREEN BY PCR Negative            SUSCEPTIBILITY  Susceptibility data from last 90 days.  Collected Specimen Info Organism   02/22/24 Urine Streptococcus agalactiae (Group B)   02/22/24 Blood No growth after 5 days.   02/22/24 Blood from Peripheral, Antecubital, Right No growth after 5 days.         CURRENT/PREVIOUS VISIT EKG  Results for orders placed or performed during the hospital encounter of 03/07/24   EKG 12-lead    Collection Time: 03/07/24  9:07 PM   Result Value Ref Range    QRS Duration 86 ms    OHS QTC Calculation 422 ms    Narrative    Test Reason : R50.9,    Vent. Rate : 075 BPM     Atrial Rate : 075 BPM     P-R Int : 154 ms          QRS Dur : 086 ms      QT Int : 378 ms       P-R-T Axes : 054 -35 043 degrees     QTc Int : 422 ms    Normal sinus rhythm  Possible Left atrial enlargement  Left axis deviation  Abnormal ECG  When compared with ECG of 22-FEB-2024 22:50,  No significant change was found  Confirmed by Yamil Prieto MD ([id number]) on 3/9/2024 3:20:28 PM    Referred By: [email or web address]   SELF           Confirmed By:Yamil Prieto MD       Significant Labs: All pertinent labs within the past 24 hours have been reviewed.      Significant Imaging: I have reviewed all relevant and available imaging results/findings within the past 24 hours.    Chidi Gilman MD  Date of Service: 03/10/2024  9:48 AM

## 2024-03-12 LAB
BACTERIA BLD CULT: NORMAL
BACTERIA BLD CULT: NORMAL

## 2024-04-01 NOTE — DISCHARGE SUMMARY
[facility] Medicine  Discharge Summary      Patient Name: Arcadio Begum  MRN: [id number]  JAYCOB: [id number]  Patient Class: IP- Inpatient  Admission Date: 3/7/2024  Hospital Length of Stay: 3 days  Discharge Date and Time: 3/10/2024  1:15 PM  Attending Physician: No att. providers found   Discharging Provider: Clarissa Eid NP  Primary Care Provider: [facility]    Primary Care Team: Networked reference to record PCT     HPI:   77 yo male with PMH of bladder cancer on BCG treatment, last of which was today, who presented with generalized weakness and fever. Per pt, he got home today after the BCG treatment and started to have generalized weakness and fatigue. Soon after, he developed a fever of 101.2. Wife gave pt 1000 mg of tylenol and [facility], and they recommended to go to the ER for eval. Pt has dysuria which is normal to him after getting BCG treatment per pt. He also usually has frequency and urgency. ROS is positive for left flank pain for 2 week.     On arrival to the ER, pt was afebrile, with bp of 113/75, and HR of 75. Labs showed leukocytosis of 14.58. CMP, lactic acid and procal are wnl. Covid/flu negative. CT Chest/abdomen/pelvis showed Mild left perinephric stranding, heterogenous parenchymal enhancement of the kidney, mild hydronephrosis and hydroureter with periureteral fat stranding extending to the bladder and without obstructing ureteral stone. Findings are suspicious for pyelonephritis and pyelitis. Also findings suspicious for cystitis. Urine and blood cx collected. Pt was given zosyn and vanco and will be admitted to medicine for further care.    * No surgery found *      Hospital Course:   Patient was monitored closely during his hospital stay.  CT abdomen pelvis revealed cystitis with left pyelonephritis.  He was initiated on IV vanc and Zosyn.  ID was consulted.  Labs were trended and remained stable.  Urine and blood cultures  negative to date.  Patient remained afebrile. Pts symptoms resolved and he felt well and desired to go home. Cultures remained neg and ID rec to complete oral abx outpt with omnicef x 5 days. Pt verbalized understanding of discharge instructions and return precautions.      Goals of Care Treatment Preferences:  Code Status: Full Code      Consults:   Consults (From admission, onward)          Status Ordering Provider     Inpatient consult to Infectious Diseases  Once        Provider:  Chidi Gilman MD    Completed GREGORY SHIN            No new Assessment & Plan notes have been filed under this hospital service since the last note was generated.  Service: Hospital Medicine    Final Active Diagnoses:    Diagnosis Date Noted POA    PRINCIPAL PROBLEM:  Sepsis without acute organ dysfunction [A41.9] 03/07/2024 Yes    Primary hypertension [I10] 03/08/2024 Yes    Bladder cancer [C67.9] 03/07/2024 Yes    Type 2 diabetes mellitus without complication, without long-term current use of insulin [E11.9] 03/07/2024 Yes    Pyelonephritis [N12] 03/07/2024 Yes    CAD (coronary artery disease) [I25.10] 08/27/2023 Yes      Problems Resolved During this Admission:    Diagnosis Date Noted Date Resolved POA    Left leg pain [M79.605] 03/07/2024 03/08/2024 Yes       Discharged Condition: good    Disposition: Home or Self Care    Follow Up:   Follow-up Information       Clinic, [facility] Follow up in 1 week(s).    Specialty: Internal Medicine  Contact information:  [address] [address] [address] [phone]                           Patient Instructions:      Diet Cardiac     Notify your health care provider if you experience any of the following:  severe uncontrolled pain     Notify your health care provider if you experience any of the following:  persistent nausea and vomiting or diarrhea     Notify your health care provider if you experience any of the following:  temperature >100.4     Activity as  tolerated       Significant Diagnostic Studies: Labs: All labs within the past 24 hours have been reviewed    Pending Diagnostic Studies:       None           Medications:  Reconciled Home Medications:      Medication List        CONTINUE taking these medications      acetaminophen 500 MG tablet  Commonly known as: TYLENOL  Take 500 mg by mouth every 6 (six) hours as needed for Pain.     amLODIPine 10 MG tablet  Commonly known as: NORVASC  Take 5 mg by mouth once daily.     aspirin 81 MG EC tablet  Commonly known as: ECOTRIN  Take 81 mg by mouth once daily.     atorvastatin 80 MG tablet  Commonly known as: LIPITOR  Take 80 mg by mouth once daily.     empagliflozin 25 mg tablet  Commonly known as: Jardiance  Take 12.5 mg by mouth once daily.     ibuprofen 200 MG tablet  Commonly known as: ADVIL,MOTRIN  Take 200 mg by mouth every 6 (six) hours as needed for Pain.     metoprolol succinate 25 MG 24 hr tablet  Commonly known as: TOPROL-XL  Take 25 mg by mouth once daily.     oxyCODONE-acetaminophen 5-325 mg per tablet  Commonly known as: PERCOCET  Take 1 tablet by mouth every 6 (six) hours as needed for Pain.            ASK your doctor about these medications      cefdinir 300 MG capsule  Commonly known as: OMNICEF  Take 1 capsule (300 mg total) by mouth 2 (two) times daily. for 5 days  Ask about: Should I take this medication?              Indwelling Lines/Drains at time of discharge:   Lines/Drains/Airways       None                   Time spent on the discharge of patient: 35 minutes         Clarissa Eid NP  Department of Hospital Medicine  [facility]

## 2024-04-23 LAB
ACID FAST MOD KINY STN SPEC: NORMAL
MYCOBACTERIUM SPEC QL CULT: NORMAL

## 2024-05-12 ENCOUNTER — HOSPITAL ENCOUNTER (INPATIENT)
Facility: HOSPITAL | Age: 79
LOS: 2 days | Discharge: HOME | DRG: 862 | End: 2024-05-15
Attending: STUDENT IN AN ORGANIZED HEALTH CARE EDUCATION/TRAINING PROGRAM | Admitting: INTERNAL MEDICINE
Payer: OTHER GOVERNMENT

## 2024-05-12 DIAGNOSIS — A41.9 SEPSIS: ICD-10-CM

## 2024-05-12 DIAGNOSIS — B95.2 INFECTION DUE TO ENTEROCOCCUS SPECIES: Primary | ICD-10-CM

## 2024-05-12 LAB
ALBUMIN SERPL BCP-MCNC: 4.2 G/DL (ref 3.5–5.2)
ALP SERPL-CCNC: 83 U/L (ref 55–135)
ALT SERPL W/O P-5'-P-CCNC: 8 U/L (ref 10–44)
ANION GAP SERPL CALC-SCNC: 9 MMOL/L (ref 8–16)
AST SERPL-CCNC: 9 U/L (ref 10–40)
BACTERIA #/AREA URNS HPF: ABNORMAL /HPF
BASOPHILS # BLD AUTO: 0.05 K/UL (ref 0–0.2)
BASOPHILS NFR BLD: 0.5 % (ref 0–1.9)
BILIRUB SERPL-MCNC: 0.7 MG/DL (ref 0.1–1)
BILIRUB UR QL STRIP: NEGATIVE
BUN SERPL-MCNC: 18 MG/DL (ref 8–23)
CALCIUM SERPL-MCNC: 9.2 MG/DL (ref 8.7–10.5)
CHLORIDE SERPL-SCNC: 105 MMOL/L (ref 95–110)
CLARITY UR: ABNORMAL
CO2 SERPL-SCNC: 22 MMOL/L (ref 23–29)
COLOR UR: YELLOW
CREAT SERPL-MCNC: 0.9 MG/DL (ref 0.5–1.4)
DIFFERENTIAL METHOD BLD: ABNORMAL
EOSINOPHIL # BLD AUTO: 0 K/UL (ref 0–0.5)
EOSINOPHIL NFR BLD: 0.2 % (ref 0–8)
ERYTHROCYTE [DISTWIDTH] IN BLOOD BY AUTOMATED COUNT: 13.9 % (ref 11.5–14.5)
EST. GFR  (NO RACE VARIABLE): >60 ML/MIN/1.73 M^2
GLUCOSE SERPL-MCNC: 122 MG/DL (ref 70–110)
GLUCOSE UR QL STRIP: ABNORMAL
HCT VFR BLD AUTO: 42.7 % (ref 40–54)
HGB BLD-MCNC: 13.9 G/DL (ref 14–18)
HGB UR QL STRIP: ABNORMAL
HYALINE CASTS #/AREA URNS LPF: 0 /LPF
IMM GRANULOCYTES # BLD AUTO: 0.03 K/UL (ref 0–0.04)
IMM GRANULOCYTES NFR BLD AUTO: 0.3 % (ref 0–0.5)
INFLUENZA A, MOLECULAR: NEGATIVE
INFLUENZA B, MOLECULAR: NEGATIVE
KETONES UR QL STRIP: ABNORMAL
LACTATE SERPL-SCNC: 0.8 MMOL/L (ref 0.5–1.9)
LEUKOCYTE ESTERASE UR QL STRIP: ABNORMAL
LIPASE SERPL-CCNC: 3 U/L (ref 4–60)
LYMPHOCYTES # BLD AUTO: 0.7 K/UL (ref 1–4.8)
LYMPHOCYTES NFR BLD: 7.5 % (ref 18–48)
MAGNESIUM SERPL-MCNC: 2 MG/DL (ref 1.6–2.6)
MCH RBC QN AUTO: 27.9 PG (ref 27–31)
MCHC RBC AUTO-ENTMCNC: 32.6 G/DL (ref 32–36)
MCV RBC AUTO: 86 FL (ref 82–98)
MICROSCOPIC COMMENT: ABNORMAL
MONOCYTES # BLD AUTO: 1 K/UL (ref 0.3–1)
MONOCYTES NFR BLD: 9.9 % (ref 4–15)
NEUTROPHILS # BLD AUTO: 7.9 K/UL (ref 1.8–7.7)
NEUTROPHILS NFR BLD: 81.6 % (ref 38–73)
NITRITE UR QL STRIP: NEGATIVE
NRBC BLD-RTO: 0 /100 WBC
PH UR STRIP: 5.5 [PH] (ref 5–8)
PHOSPHATE SERPL-MCNC: 2.7 MG/DL (ref 2.7–4.5)
PLATELET # BLD AUTO: 225 K/UL (ref 150–450)
PMV BLD AUTO: 10.4 FL (ref 9.2–12.9)
POTASSIUM SERPL-SCNC: 3.9 MMOL/L (ref 3.5–5.1)
PROT SERPL-MCNC: 7.4 G/DL (ref 6–8.4)
PROT UR QL STRIP: ABNORMAL
RBC # BLD AUTO: 4.99 M/UL (ref 4.6–6.2)
RBC #/AREA URNS HPF: 18 /HPF (ref 0–4)
SARS-COV-2 RDRP RESP QL NAA+PROBE: NEGATIVE
SODIUM SERPL-SCNC: 136 MMOL/L (ref 136–145)
SP GR UR STRIP: 1.02 (ref 1–1.03)
SPECIMEN SOURCE: NORMAL
TSH SERPL DL<=0.005 MIU/L-ACNC: 0.69 UIU/ML (ref 0.34–5.6)
URN SPEC COLLECT METH UR: ABNORMAL
UROBILINOGEN UR STRIP-ACNC: NEGATIVE EU/DL
WBC # BLD AUTO: 9.68 K/UL (ref 3.9–12.7)
WBC #/AREA URNS HPF: >100 /HPF (ref 0–5)
YEAST URNS QL MICRO: ABNORMAL

## 2024-05-12 PROCEDURE — 87502 INFLUENZA DNA AMP PROBE: CPT | Performed by: STUDENT IN AN ORGANIZED HEALTH CARE EDUCATION/TRAINING PROGRAM

## 2024-05-12 PROCEDURE — U0002 COVID-19 LAB TEST NON-CDC: HCPCS | Performed by: STUDENT IN AN ORGANIZED HEALTH CARE EDUCATION/TRAINING PROGRAM

## 2024-05-12 PROCEDURE — 87077 CULTURE AEROBIC IDENTIFY: CPT | Performed by: STUDENT IN AN ORGANIZED HEALTH CARE EDUCATION/TRAINING PROGRAM

## 2024-05-12 PROCEDURE — 87086 URINE CULTURE/COLONY COUNT: CPT | Performed by: STUDENT IN AN ORGANIZED HEALTH CARE EDUCATION/TRAINING PROGRAM

## 2024-05-12 PROCEDURE — 84443 ASSAY THYROID STIM HORMONE: CPT | Performed by: STUDENT IN AN ORGANIZED HEALTH CARE EDUCATION/TRAINING PROGRAM

## 2024-05-12 PROCEDURE — 87186 SC STD MICRODIL/AGAR DIL: CPT | Performed by: STUDENT IN AN ORGANIZED HEALTH CARE EDUCATION/TRAINING PROGRAM

## 2024-05-12 PROCEDURE — 94761 N-INVAS EAR/PLS OXIMETRY MLT: CPT

## 2024-05-12 PROCEDURE — 96365 THER/PROPH/DIAG IV INF INIT: CPT

## 2024-05-12 PROCEDURE — 84100 ASSAY OF PHOSPHORUS: CPT | Performed by: STUDENT IN AN ORGANIZED HEALTH CARE EDUCATION/TRAINING PROGRAM

## 2024-05-12 PROCEDURE — 99285 EMERGENCY DEPT VISIT HI MDM: CPT | Mod: 25

## 2024-05-12 PROCEDURE — 80053 COMPREHEN METABOLIC PANEL: CPT | Performed by: STUDENT IN AN ORGANIZED HEALTH CARE EDUCATION/TRAINING PROGRAM

## 2024-05-12 PROCEDURE — 83735 ASSAY OF MAGNESIUM: CPT | Performed by: STUDENT IN AN ORGANIZED HEALTH CARE EDUCATION/TRAINING PROGRAM

## 2024-05-12 PROCEDURE — 25000003 PHARM REV CODE 250: Performed by: STUDENT IN AN ORGANIZED HEALTH CARE EDUCATION/TRAINING PROGRAM

## 2024-05-12 PROCEDURE — 87040 BLOOD CULTURE FOR BACTERIA: CPT | Performed by: STUDENT IN AN ORGANIZED HEALTH CARE EDUCATION/TRAINING PROGRAM

## 2024-05-12 PROCEDURE — 83690 ASSAY OF LIPASE: CPT | Performed by: STUDENT IN AN ORGANIZED HEALTH CARE EDUCATION/TRAINING PROGRAM

## 2024-05-12 PROCEDURE — 36415 COLL VENOUS BLD VENIPUNCTURE: CPT | Performed by: STUDENT IN AN ORGANIZED HEALTH CARE EDUCATION/TRAINING PROGRAM

## 2024-05-12 PROCEDURE — 63600175 PHARM REV CODE 636 W HCPCS: Performed by: STUDENT IN AN ORGANIZED HEALTH CARE EDUCATION/TRAINING PROGRAM

## 2024-05-12 PROCEDURE — 85025 COMPLETE CBC W/AUTO DIFF WBC: CPT | Performed by: STUDENT IN AN ORGANIZED HEALTH CARE EDUCATION/TRAINING PROGRAM

## 2024-05-12 PROCEDURE — 81001 URINALYSIS AUTO W/SCOPE: CPT | Performed by: STUDENT IN AN ORGANIZED HEALTH CARE EDUCATION/TRAINING PROGRAM

## 2024-05-12 PROCEDURE — 83605 ASSAY OF LACTIC ACID: CPT | Performed by: STUDENT IN AN ORGANIZED HEALTH CARE EDUCATION/TRAINING PROGRAM

## 2024-05-12 RX ORDER — ACETAMINOPHEN 500 MG
1 TABLET ORAL EVERY OTHER DAY
COMMUNITY

## 2024-05-12 RX ADMIN — CEFTRIAXONE SODIUM 2 G: 2 INJECTION, POWDER, FOR SOLUTION INTRAMUSCULAR; INTRAVENOUS at 08:05

## 2024-05-12 RX ADMIN — SODIUM CHLORIDE, SODIUM LACTATE, POTASSIUM CHLORIDE, AND CALCIUM CHLORIDE 1000 ML: .6; .31; .03; .02 INJECTION, SOLUTION INTRAVENOUS at 08:05

## 2024-05-12 NOTE — Clinical Note
Diagnosis: Sepsis [633865]   Future Attending Provider: JACKIE MELVIN [[id number]]   Admit to which facility:: [facility] [[id number]]   Reason for IP Medical Treatment  (Clinical interventions that can only be accomplished in the IP setting? ) :: Sepsis secondary to UTI   I certify that Inpatient services for greater than or equal to 2 midnights are medically necessary:: Yes   Plans for Post-Acute care--if anticipated (pick the single best option):: A. No post acute care anticipated at this time

## 2024-05-12 NOTE — ED PROVIDER NOTES
Encounter Date: 5/12/2024       History     Chief Complaint   Patient presents with    Abdominal Pain     Lower abd pain for 2 days, burning pain. Hx of bladder CA and UTI.     HPI  78-year-old presenting with fatigue and dysuria.  History of bladder cancer.  Reports that he was having cystoscopy to monitor for return of cancer 2 days ago.  The day after cystoscopy started feeling fatigued and dysuria.  Today fatigue as significant and his 2 family members had to completely hold up his body when trying to transition him.  He was had history of UTI and pyelo after bladder interventions in the past.  Most recently he had pyelo and was discharged on cefdinir last month.  Wife reports that patient has been told that he was chronic urinary retention due to urethral stricture and we will need intervention for this but this is only scheduled for August.  No localized weakness, numbness, tingling, vision changes.  Does have generalized weakness.  Review of patient's allergies indicates:  No Known Allergies  Past Medical History:   Diagnosis Date    Left leg pain 03/07/2024     No past surgical history on file.  No family history on file.  Social History     Substance Use Topics    Alcohol use: Not Currently     Review of Systems   Constitutional:  Positive for fatigue. Negative for chills and fever.   HENT:  Negative for congestion and sore throat.    Respiratory:  Negative for cough and shortness of breath.    Cardiovascular:  Negative for chest pain and leg swelling.   Gastrointestinal:  Negative for abdominal pain, blood in stool, constipation, diarrhea, nausea and vomiting.   Genitourinary:  Positive for dysuria. Negative for frequency.   Skin:  Negative for rash.       Physical Exam     Initial Vitals [05/12/24 1803]   BP Pulse Resp Temp SpO2   (!) 142/70 69 16 99.3 °F (37.4 °C) 95 %      MAP       --         Physical Exam    Nursing note and vitals reviewed.  Constitutional: He appears well-developed and  well-nourished. He is not diaphoretic.   Answering questions in full sentences without increased work of breathing. Appears fatigued. Keeps eyes closed for most of eval   HENT:   Head: Normocephalic.   Eyes: Right eye exhibits no discharge. Left eye exhibits no discharge. No scleral icterus.   Neck: Neck supple. No tracheal deviation present.   Normal range of motion.  Cardiovascular:  Normal rate and regular rhythm.           Pulmonary/Chest: No stridor. No respiratory distress. He has no wheezes. He has no rhonchi. He has no rales. He exhibits no tenderness.   Abdominal: Abdomen is soft. There is abdominal tenderness (mild suprapubic).   +R CVA ttp There is no rebound and no guarding.   Genitourinary:    Genitourinary Comments: Genital exam completed with nurse chaperone.  No erythema, skin changes, edema, discharge from the penis, tenderness to palpation of the penis or the testicles     Musculoskeletal:         General: No edema.      Cervical back: Normal range of motion and neck supple.     Neurological: He is alert and oriented to person, place, and time.   Moving all extremities   Skin: Skin is warm and dry.         ED Course   Procedures  Labs Reviewed   CBC W/ AUTO DIFFERENTIAL - Abnormal; Notable for the following components:       Result Value    Hemoglobin 13.9 (*)     Gran # (ANC) 7.9 (*)     Lymph # 0.7 (*)     Gran % 81.6 (*)     Lymph % 7.5 (*)     All other components within normal limits   COMPREHENSIVE METABOLIC PANEL - Abnormal; Notable for the following components:    CO2 22 (*)     Glucose 122 (*)     AST 9 (*)     ALT 8 (*)     All other components within normal limits   URINALYSIS, REFLEX TO URINE CULTURE - Abnormal; Notable for the following components:    Appearance, UA Cloudy (*)     Protein, UA 1+ (*)     Glucose, UA 4+ (*)     Ketones, UA Trace (*)     Occult Blood UA 3+ (*)     Leukocytes, UA 2+ (*)     All other components within normal limits    Narrative:     Specimen Source->Urine    LIPASE - Abnormal; Notable for the following components:    Lipase 3 (*)     All other components within normal limits   URINALYSIS MICROSCOPIC - Abnormal; Notable for the following components:    RBC, UA 18 (*)     WBC, UA >100 (*)     Bacteria Few (*)     All other components within normal limits    Narrative:     Specimen Source->Urine   CULTURE, BLOOD    Narrative:     Aerobic and anaerobic   CULTURE, BLOOD    Narrative:     Aerobic and anaerobic   CULTURE, URINE   LACTIC ACID, PLASMA   INFLUENZA A AND B ANTIGEN    Narrative:     Specimen Source->Nasopharyngeal Swab   MAGNESIUM   PHOSPHORUS   SARS-COV-2 RNA AMPLIFICATION, QUAL   TSH   HEMOGLOBIN A1C   CBC W/ AUTO DIFFERENTIAL   COMPREHENSIVE METABOLIC PANEL          Imaging Results              X-Ray Chest AP Portable (Final result)  Result time 05/12/24 20:31:02      Final result by Barbara Arguelles MD (05/12/24 20:31:02)                   Impression:      No acute abnormality involving the chest as imaged.      Electronically signed by: Barbara Arguelles  Date:    05/12/2024  Time:    20:31               Narrative:    EXAMINATION:  XR CHEST AP PORTABLE    CLINICAL HISTORY:  Sepsis;    TECHNIQUE:  Single frontal view of the chest was performed.    COMPARISON:  03/07/2024, 02/22/2024 chest x-ray    FINDINGS:  The cardiac silhouette is not enlarged.  The aorta is mildly tortuous.  There is no acute lung parenchymal or pleural abnormality allowing for mild basilar atelectasis and left hemidiaphragm elevation.  There is no pleural effusion or pneumothorax.                                       Medications   sodium chloride 0.9% flush 10 mL (has no administration in time range)   melatonin tablet 6 mg (has no administration in time range)   cefTRIAXone (ROCEPHIN) 1 g in dextrose 5 % 100 mL IVPB (ready to mix) (has no administration in time range)   acetaminophen tablet 650 mg (has no administration in time range)   HYDROcodone-acetaminophen 5-325 mg per tablet 1  tablet (has no administration in time range)   HYDROcodone-acetaminophen  mg per tablet 1 tablet (has no administration in time range)   polyethylene glycol packet 17 g (has no administration in time range)   senna-docusate 8.6-50 mg per tablet 1 tablet (has no administration in time range)   ondansetron injection 4 mg (has no administration in time range)   acetaminophen tablet 650 mg (has no administration in time range)   enoxaparin injection 40 mg (has no administration in time range)   cefTRIAXone (ROCEPHIN) 2 g in dextrose 5 % 100 mL IVPB (ready to mix) (0 g Intravenous Stopped 5/12/24 2122)   lactated ringers bolus 1,000 mL (0 mLs Intravenous Stopped 5/12/24 2122)   acetaminophen tablet 1,000 mg (1,000 mg Oral Given 5/13/24 0159)   oxyCODONE immediate release tablet 5 mg (5 mg Oral Given 5/13/24 0159)     Medical Decision Making  Amount and/or Complexity of Data Reviewed  Labs: ordered.  Radiology: ordered.    Risk  OTC drugs.  Prescription drug management.  Decision regarding hospitalization.                On my independent interpretation labs CBC, CMP, lipase, TSH, flu, COVID, urinalysis, magnesium, lactic, chest x-ray within acceptable limits except for urinalysis indicating infection and dehydration.     On my independent interpretation EKG with rate of 75, normal intervals, no sign of acute occlusion myocardial infarction    Patient treated for pyelonephritis with ceftriaxone after blood cultures obtained due to CVA tenderness as well as urine indicating infection.  With dehydration patient also given fluid resuscitation.  Patient was not septic.  Due to his severe fatigue patient's wife discusses that they are unable to safely transfer him at home and therefore patient admitted after discussion with . No CT abd pelvis as on mult abd exams pt with non surgical abd, mild suprapubic ttp, clinically consistent with uti/pyelo based on recent intervention and hx. If clinically worsening  will have CT abd pelvis in hospital  Agnieszka Hicks MD  Emergency Medicine Staff Physician                          Clinical Impression:  Final diagnoses:  [A41.9] Sepsis          ED Disposition Condition    Admit Stable                Agnieszka Hicks MD  05/13/24 3820

## 2024-05-13 LAB
ALBUMIN SERPL BCP-MCNC: 3.7 G/DL (ref 3.5–5.2)
ALP SERPL-CCNC: 66 U/L (ref 55–135)
ALT SERPL W/O P-5'-P-CCNC: 6 U/L (ref 10–44)
ANION GAP SERPL CALC-SCNC: 7 MMOL/L (ref 8–16)
AST SERPL-CCNC: 8 U/L (ref 10–40)
BASOPHILS # BLD AUTO: 0.05 K/UL (ref 0–0.2)
BASOPHILS NFR BLD: 0.5 % (ref 0–1.9)
BILIRUB SERPL-MCNC: 0.8 MG/DL (ref 0.1–1)
BUN SERPL-MCNC: 17 MG/DL (ref 8–23)
CALCIUM SERPL-MCNC: 8.8 MG/DL (ref 8.7–10.5)
CHLORIDE SERPL-SCNC: 106 MMOL/L (ref 95–110)
CO2 SERPL-SCNC: 23 MMOL/L (ref 23–29)
CREAT SERPL-MCNC: 0.9 MG/DL (ref 0.5–1.4)
DIFFERENTIAL METHOD BLD: ABNORMAL
EOSINOPHIL # BLD AUTO: 0 K/UL (ref 0–0.5)
EOSINOPHIL NFR BLD: 0.4 % (ref 0–8)
ERYTHROCYTE [DISTWIDTH] IN BLOOD BY AUTOMATED COUNT: 13.9 % (ref 11.5–14.5)
EST. GFR  (NO RACE VARIABLE): >60 ML/MIN/1.73 M^2
ESTIMATED AVG GLUCOSE: 140 MG/DL (ref 68–131)
GLUCOSE SERPL-MCNC: 107 MG/DL (ref 70–110)
GLUCOSE SERPL-MCNC: 155 MG/DL (ref 70–110)
GLUCOSE SERPL-MCNC: 155 MG/DL (ref 70–110)
HBA1C MFR BLD: 6.5 % (ref 4.5–6.2)
HCT VFR BLD AUTO: 39.1 % (ref 40–54)
HGB BLD-MCNC: 12.9 G/DL (ref 14–18)
IMM GRANULOCYTES # BLD AUTO: 0.03 K/UL (ref 0–0.04)
IMM GRANULOCYTES NFR BLD AUTO: 0.3 % (ref 0–0.5)
LACTATE SERPL-SCNC: 0.6 MMOL/L (ref 0.5–1.9)
LYMPHOCYTES # BLD AUTO: 1.4 K/UL (ref 1–4.8)
LYMPHOCYTES NFR BLD: 14.6 % (ref 18–48)
MCH RBC QN AUTO: 28 PG (ref 27–31)
MCHC RBC AUTO-ENTMCNC: 33 G/DL (ref 32–36)
MCV RBC AUTO: 85 FL (ref 82–98)
MONOCYTES # BLD AUTO: 1.4 K/UL (ref 0.3–1)
MONOCYTES NFR BLD: 14.5 % (ref 4–15)
NEUTROPHILS # BLD AUTO: 6.6 K/UL (ref 1.8–7.7)
NEUTROPHILS NFR BLD: 69.7 % (ref 38–73)
NRBC BLD-RTO: 0 /100 WBC
PLATELET # BLD AUTO: 208 K/UL (ref 150–450)
PMV BLD AUTO: 10.5 FL (ref 9.2–12.9)
POTASSIUM SERPL-SCNC: 3.9 MMOL/L (ref 3.5–5.1)
PROT SERPL-MCNC: 6.6 G/DL (ref 6–8.4)
RBC # BLD AUTO: 4.6 M/UL (ref 4.6–6.2)
SODIUM SERPL-SCNC: 136 MMOL/L (ref 136–145)
WBC # BLD AUTO: 9.44 K/UL (ref 3.9–12.7)

## 2024-05-13 PROCEDURE — 93005 ELECTROCARDIOGRAM TRACING: CPT | Performed by: GENERAL PRACTICE

## 2024-05-13 PROCEDURE — 12000002 HC ACUTE/MED SURGE SEMI-PRIVATE ROOM

## 2024-05-13 PROCEDURE — 25000003 PHARM REV CODE 250: Performed by: INTERNAL MEDICINE

## 2024-05-13 PROCEDURE — 36415 COLL VENOUS BLD VENIPUNCTURE: CPT | Performed by: INTERNAL MEDICINE

## 2024-05-13 PROCEDURE — 25000003 PHARM REV CODE 250: Performed by: STUDENT IN AN ORGANIZED HEALTH CARE EDUCATION/TRAINING PROGRAM

## 2024-05-13 PROCEDURE — 83605 ASSAY OF LACTIC ACID: CPT | Performed by: INTERNAL MEDICINE

## 2024-05-13 PROCEDURE — 94761 N-INVAS EAR/PLS OXIMETRY MLT: CPT

## 2024-05-13 PROCEDURE — 85025 COMPLETE CBC W/AUTO DIFF WBC: CPT | Performed by: INTERNAL MEDICINE

## 2024-05-13 PROCEDURE — 63600175 PHARM REV CODE 636 W HCPCS: Performed by: INTERNAL MEDICINE

## 2024-05-13 PROCEDURE — 83036 HEMOGLOBIN GLYCOSYLATED A1C: CPT | Performed by: INTERNAL MEDICINE

## 2024-05-13 PROCEDURE — 94760 N-INVAS EAR/PLS OXIMETRY 1: CPT

## 2024-05-13 PROCEDURE — 93010 ELECTROCARDIOGRAM REPORT: CPT | Mod: ,,, | Performed by: GENERAL PRACTICE

## 2024-05-13 PROCEDURE — 80053 COMPREHEN METABOLIC PANEL: CPT | Performed by: INTERNAL MEDICINE

## 2024-05-13 PROCEDURE — 51798 US URINE CAPACITY MEASURE: CPT

## 2024-05-13 RX ORDER — HYDROCODONE BITARTRATE AND ACETAMINOPHEN 10; 325 MG/1; MG/1
1 TABLET ORAL EVERY 4 HOURS PRN
Status: DISCONTINUED | OUTPATIENT
Start: 2024-05-13 | End: 2024-05-13

## 2024-05-13 RX ORDER — ASPIRIN 81 MG/1
81 TABLET ORAL DAILY
Status: DISCONTINUED | OUTPATIENT
Start: 2024-05-13 | End: 2024-05-15 | Stop reason: HOSPADM

## 2024-05-13 RX ORDER — OXYCODONE AND ACETAMINOPHEN 5; 325 MG/1; MG/1
1 TABLET ORAL EVERY 6 HOURS PRN
Status: DISCONTINUED | OUTPATIENT
Start: 2024-05-13 | End: 2024-05-15 | Stop reason: HOSPADM

## 2024-05-13 RX ORDER — ONDANSETRON HYDROCHLORIDE 2 MG/ML
4 INJECTION, SOLUTION INTRAVENOUS EVERY 8 HOURS PRN
Status: DISCONTINUED | OUTPATIENT
Start: 2024-05-13 | End: 2024-05-15 | Stop reason: HOSPADM

## 2024-05-13 RX ORDER — INSULIN ASPART 100 [IU]/ML
0-10 INJECTION, SOLUTION INTRAVENOUS; SUBCUTANEOUS
Status: DISCONTINUED | OUTPATIENT
Start: 2024-05-13 | End: 2024-05-15 | Stop reason: HOSPADM

## 2024-05-13 RX ORDER — METOPROLOL SUCCINATE 25 MG/1
25 TABLET, EXTENDED RELEASE ORAL DAILY
Status: DISCONTINUED | OUTPATIENT
Start: 2024-05-13 | End: 2024-05-15 | Stop reason: HOSPADM

## 2024-05-13 RX ORDER — OXYCODONE HYDROCHLORIDE 5 MG/1
5 TABLET ORAL ONCE
Status: COMPLETED | OUTPATIENT
Start: 2024-05-13 | End: 2024-05-13

## 2024-05-13 RX ORDER — IBUPROFEN 200 MG
16 TABLET ORAL
Status: DISCONTINUED | OUTPATIENT
Start: 2024-05-13 | End: 2024-05-15 | Stop reason: HOSPADM

## 2024-05-13 RX ORDER — ENOXAPARIN SODIUM 100 MG/ML
40 INJECTION SUBCUTANEOUS EVERY 24 HOURS
Status: DISCONTINUED | OUTPATIENT
Start: 2024-05-13 | End: 2024-05-15 | Stop reason: HOSPADM

## 2024-05-13 RX ORDER — ACETAMINOPHEN 500 MG
1000 TABLET ORAL
Status: COMPLETED | OUTPATIENT
Start: 2024-05-13 | End: 2024-05-13

## 2024-05-13 RX ORDER — POLYETHYLENE GLYCOL 3350 17 G/17G
17 POWDER, FOR SOLUTION ORAL DAILY
Status: DISCONTINUED | OUTPATIENT
Start: 2024-05-13 | End: 2024-05-15 | Stop reason: HOSPADM

## 2024-05-13 RX ORDER — SODIUM CHLORIDE 0.9 % (FLUSH) 0.9 %
10 SYRINGE (ML) INJECTION
Status: DISCONTINUED | OUTPATIENT
Start: 2024-05-13 | End: 2024-05-15 | Stop reason: HOSPADM

## 2024-05-13 RX ORDER — AMOXICILLIN 250 MG
1 CAPSULE ORAL 2 TIMES DAILY
Status: DISCONTINUED | OUTPATIENT
Start: 2024-05-13 | End: 2024-05-15 | Stop reason: HOSPADM

## 2024-05-13 RX ORDER — IBUPROFEN 200 MG
24 TABLET ORAL
Status: DISCONTINUED | OUTPATIENT
Start: 2024-05-13 | End: 2024-05-15 | Stop reason: HOSPADM

## 2024-05-13 RX ORDER — HYDROCODONE BITARTRATE AND ACETAMINOPHEN 5; 325 MG/1; MG/1
1 TABLET ORAL EVERY 4 HOURS PRN
Status: DISCONTINUED | OUTPATIENT
Start: 2024-05-13 | End: 2024-05-13

## 2024-05-13 RX ORDER — TALC
6 POWDER (GRAM) TOPICAL NIGHTLY PRN
Status: DISCONTINUED | OUTPATIENT
Start: 2024-05-13 | End: 2024-05-15 | Stop reason: HOSPADM

## 2024-05-13 RX ORDER — ATORVASTATIN CALCIUM 40 MG/1
80 TABLET, FILM COATED ORAL DAILY
Status: DISCONTINUED | OUTPATIENT
Start: 2024-05-13 | End: 2024-05-15 | Stop reason: HOSPADM

## 2024-05-13 RX ORDER — ACETAMINOPHEN 325 MG/1
650 TABLET ORAL EVERY 8 HOURS PRN
Status: DISCONTINUED | OUTPATIENT
Start: 2024-05-13 | End: 2024-05-15 | Stop reason: HOSPADM

## 2024-05-13 RX ORDER — AMLODIPINE BESYLATE 5 MG/1
5 TABLET ORAL DAILY
Status: DISCONTINUED | OUTPATIENT
Start: 2024-05-13 | End: 2024-05-15 | Stop reason: HOSPADM

## 2024-05-13 RX ORDER — ACETAMINOPHEN 325 MG/1
650 TABLET ORAL EVERY 8 HOURS PRN
Status: DISCONTINUED | OUTPATIENT
Start: 2024-05-13 | End: 2024-05-14

## 2024-05-13 RX ORDER — GLUCAGON 1 MG
1 KIT INJECTION
Status: DISCONTINUED | OUTPATIENT
Start: 2024-05-13 | End: 2024-05-15 | Stop reason: HOSPADM

## 2024-05-13 RX ADMIN — ACETAMINOPHEN 1000 MG: 500 TABLET ORAL at 01:05

## 2024-05-13 RX ADMIN — ASPIRIN 81 MG: 81 TABLET, COATED ORAL at 09:05

## 2024-05-13 RX ADMIN — SENNOSIDES AND DOCUSATE SODIUM 1 TABLET: 8.6; 5 TABLET ORAL at 08:05

## 2024-05-13 RX ADMIN — ENOXAPARIN SODIUM 40 MG: 40 INJECTION SUBCUTANEOUS at 08:05

## 2024-05-13 RX ADMIN — METOPROLOL SUCCINATE 25 MG: 25 TABLET, EXTENDED RELEASE ORAL at 09:05

## 2024-05-13 RX ADMIN — OXYCODONE HYDROCHLORIDE AND ACETAMINOPHEN 1 TABLET: 5; 325 TABLET ORAL at 09:05

## 2024-05-13 RX ADMIN — OXYCODONE HYDROCHLORIDE 5 MG: 5 TABLET ORAL at 01:05

## 2024-05-13 RX ADMIN — POLYETHYLENE GLYCOL 3350 17 G: 17 POWDER, FOR SOLUTION ORAL at 09:05

## 2024-05-13 RX ADMIN — CEFTRIAXONE SODIUM 1 G: 1 INJECTION, POWDER, FOR SOLUTION INTRAMUSCULAR; INTRAVENOUS at 08:05

## 2024-05-13 RX ADMIN — OXYCODONE HYDROCHLORIDE AND ACETAMINOPHEN 1 TABLET: 5; 325 TABLET ORAL at 04:05

## 2024-05-13 RX ADMIN — OXYCODONE HYDROCHLORIDE AND ACETAMINOPHEN 1 TABLET: 5; 325 TABLET ORAL at 10:05

## 2024-05-13 RX ADMIN — AMLODIPINE BESYLATE 5 MG: 5 TABLET ORAL at 09:05

## 2024-05-13 RX ADMIN — ATORVASTATIN CALCIUM 80 MG: 40 TABLET, FILM COATED ORAL at 09:05

## 2024-05-13 RX ADMIN — SENNOSIDES AND DOCUSATE SODIUM 1 TABLET: 8.6; 5 TABLET ORAL at 09:05

## 2024-05-13 NOTE — NURSING
Nurses Note -- 4 Eyes      5/13/2024   6:43 PM      Skin assessed during: Admit      [x] No Altered Skin Integrity Present    []Prevention Measures Documented      [] Yes- Altered Skin Integrity Present or Discovered   [] LDA Added if Not in Epic (Describe Wound)   [] New Altered Skin Integrity was Present on Admit and Documented in LDA   [] Wound Image Taken    Wound Care Consulted? No    Attending Nurse:  Marcus Ac RN/Staff Member:   bozena shine day nurse.

## 2024-05-13 NOTE — ASSESSMENT & PLAN NOTE
Diagnosed in 2023  Has had 3 cystoscope procedures since then  Needs revision surgery done in August 2024

## 2024-05-13 NOTE — ASSESSMENT & PLAN NOTE
>>ASSESSMENT AND PLAN FOR SEPSIS WITHOUT ACUTE ORGAN DYSFUNCTION WRITTEN ON 5/13/2024  5:49 AM BY JACKIE MELVIN MD    Urinalysis reflex to urine culture  Follow Urine cultures  Start IV Ceftriaxone 1gm dly x 5 days  Trend lactic acid

## 2024-05-13 NOTE — ASSESSMENT & PLAN NOTE
Urinalysis reflex to urine culture  Follow Urine cultures  Start IV Ceftriaxone 1gm dly  Trend lactic acid

## 2024-05-13 NOTE — ASSESSMENT & PLAN NOTE
"Patient's FSGs are uncontrolled due to hyperglycemia on current medication regimen.  Last A1c reviewed-   Lab Results   Component Value Date    HGBA1C 8.5 (H) 03/08/2024     Most recent fingerstick glucose reviewed- No results for input(s): "POCTGLUCOSE" in the last 24 hours.  Current correctional scale  Medium  Maintain anti-hyperglycemic dose as follows-   Antihyperglycemics (From admission, onward)      None        Start Insulin Sliding scale  Start Lantus 10 units SQ QHS  Hold Oral hypoglycemics while patient is in the hospital.  "

## 2024-05-13 NOTE — SUBJECTIVE & OBJECTIVE
Past Medical History:   Diagnosis Date    Left leg pain 03/07/2024       No past surgical history on file.    Review of patient's allergies indicates:  No Known Allergies    No current facility-administered medications on file prior to encounter.     Current Outpatient Medications on File Prior to Encounter   Medication Sig    acetaminophen (TYLENOL) 500 MG tablet Take 500 mg by mouth every 6 (six) hours as needed for Pain.    amLODIPine (NORVASC) 10 MG tablet Take 5 mg by mouth once daily.    aspirin (ECOTRIN) 81 MG EC tablet Take 81 mg by mouth once daily.    atorvastatin (LIPITOR) 80 MG tablet Take 80 mg by mouth once daily.    cholecalciferol, vitamin D3, (VITAMIN D3) 50 mcg (2,000 unit) Cap capsule Take 1 capsule by mouth every other day.    empagliflozin (JARDIANCE) 25 mg tablet Take 12.5 mg by mouth once daily.    ibuprofen (ADVIL,MOTRIN) 200 MG tablet Take 200 mg by mouth every 6 (six) hours as needed for Pain.    metoprolol succinate (TOPROL-XL) 25 MG 24 hr tablet Take 25 mg by mouth once daily.    oxyCODONE-acetaminophen (PERCOCET) 5-325 mg per tablet Take 1 tablet by mouth every 6 (six) hours as needed for Pain.     Family History    None       Tobacco Use    Smoking status: Not on file    Smokeless tobacco: Not on file   Substance and Sexual Activity    Alcohol use: Not Currently    Drug use: Not on file    Sexual activity: Not Currently     Review of Systems   Constitutional:  Positive for fatigue. Negative for chills, fever and unexpected weight change.   HENT:  Negative for ear pain, nosebleeds and sore throat.    Respiratory:  Negative for cough, shortness of breath and wheezing.    Cardiovascular:  Negative for chest pain, palpitations and leg swelling.   Gastrointestinal:  Positive for abdominal pain. Negative for constipation, diarrhea, nausea and vomiting.   Genitourinary:  Positive for difficulty urinating and dysuria. Negative for frequency and urgency.   Musculoskeletal:  Negative for back  pain, joint swelling and neck pain.   Skin:  Negative for pallor.   Allergic/Immunologic: Negative for food allergies.   Neurological:  Negative for dizziness, tremors, seizures, syncope, speech difficulty, weakness, numbness and headaches.   Psychiatric/Behavioral:  Negative for behavioral problems and sleep disturbance.    All other systems reviewed and are negative.    Objective:     Vital Signs (Most Recent):  Temp: 99.1 °F (37.3 °C) (05/12/24 2220)  Pulse: 62 (05/13/24 0403)  Resp: (!) 24 (05/13/24 0403)  BP: 133/64 (05/13/24 0403)  SpO2: (!) 92 % (05/13/24 0403) Vital Signs (24h Range):  Temp:  [99.1 °F (37.3 °C)-99.3 °F (37.4 °C)] 99.1 °F (37.3 °C)  Pulse:  [61-75] 62  Resp:  [13-30] 24  SpO2:  [92 %-98 %] 92 %  BP: (133-165)/(64-89) 133/64     Weight: 86.6 kg (190 lb 14.7 oz)  Body mass index is 30.81 kg/m².     Physical Exam  Constitutional:       Appearance: Normal appearance.   HENT:      Head: Normocephalic and atraumatic.      Nose: Nose normal.   Eyes:      Extraocular Movements: Extraocular movements intact.      Pupils: Pupils are equal, round, and reactive to light.   Cardiovascular:      Rate and Rhythm: Normal rate and regular rhythm.      Pulses: Normal pulses.      Heart sounds: Normal heart sounds.   Pulmonary:      Effort: Pulmonary effort is normal.      Breath sounds: Normal breath sounds.   Abdominal:      General: Abdomen is flat. Bowel sounds are normal.      Palpations: Abdomen is soft.      Tenderness: There is abdominal tenderness. There is right CVA tenderness.   Musculoskeletal:         General: Normal range of motion.      Cervical back: Normal range of motion.   Skin:     General: Skin is warm and dry.      Capillary Refill: Capillary refill takes less than 2 seconds.   Neurological:      General: No focal deficit present.      Mental Status: He is alert.      Comments: Keeps eyes closed throughout the interview, keeps answers short, wife answers most of the questions    Psychiatric:         Mood and Affect: Mood normal.              CRANIAL NERVES     CN III, IV, VI   Pupils are equal, round, and reactive to light.       Significant Labs: All pertinent labs within the past 24 hours have been reviewed.    Significant Imaging: I have reviewed all pertinent imaging results/findings within the past 24 hours.

## 2024-05-13 NOTE — ASSESSMENT & PLAN NOTE
Chronic, controlled. Latest blood pressure and vitals reviewed-     Temp:  [99.1 °F (37.3 °C)-99.3 °F (37.4 °C)]   Pulse:  [61-75]   Resp:  [13-30]   BP: (133-165)/(64-89)   SpO2:  [92 %-98 %] .   Home meds for hypertension were reviewed and noted below.   Hypertension Medications               amLODIPine (NORVASC) 10 MG tablet Take 5 mg by mouth once daily.    metoprolol succinate (TOPROL-XL) 25 MG 24 hr tablet Take 25 mg by mouth once daily.            While in the hospital, will manage blood pressure as follows; Continue home antihypertensive regimen    Will utilize p.r.n. blood pressure medication only if patient's blood pressure greater than 180/110 and he develops symptoms such as worsening chest pain or shortness of breath.

## 2024-05-13 NOTE — PLAN OF CARE
[facility] - Emergency Dept  Initial Discharge Assessment       Primary Care Provider: [facility]    Admission Diagnosis: Sepsis [A41.9]    Admission Date: 5/12/2024  Expected Discharge Date:     met with Pt at bedside to complete discharge assessment. Pt AAOx4s. Demographics, PCP, and insurance verified. No home health. No dialysis. Pt reports ability to complete ADLs without assistance. Pt verbalized plan to discharge home via family transport. Pt has no other needs to be addressed at this time.    Transition of Care Barriers: None    Payor: [employer] / Plan: [facility] [employer] [employer] / Product Type: Government /     Extended Emergency Contact Information  Primary Emergency Contact: Cleo Begum  Mobile Phone: [phone]  Relation: Spouse  Preferred language: English   needed? No    Discharge Plan A: Home with family  Discharge Plan B: Home with family      [facility] - [place], [place] - [id number]  [address]  [place] [address]  Phone: [phone] Fax: [phone]    [facility] #[id number] - [place] - [address]  [address] [address]  Phone: [phone] Fax: [phone]    [id number] - [place] - [address]  [address] & [address]  [address] [address]  Phone: [phone] Fax: [phone]    [facility] [id number]  [place] - [address]  [address] [address]  Phone: [phone] Fax: [phone]      Initial Assessment (most recent)       Adult Discharge Assessment - 05/13/24 0959          Discharge Assessment    Assessment Type Discharge Planning Assessment     Confirmed/corrected address, phone number and insurance Yes     Confirmed Demographics Correct on Facesheet     Source of Information patient     Communicated KRISTAN with patient/caregiver Date not available/Unable to determine     Reason For Admission Sepsis without acute  organ dysfunction     People in Home spouse     Do you expect to return to your current living situation? Yes     Do you have help at home or someone to help you manage your care at home? Yes     Who are your caregiver(s) and their phone number(s)? Cleo Begum (Spouse)  [phone]     Prior to hospitilization cognitive status: Alert/Oriented     Current cognitive status: Alert/Oriented     Walking or Climbing Stairs Difficulty no     Dressing/Bathing Difficulty no     Home Accessibility wheelchair accessible     Home Layout Able to live on 1st floor     Equipment Currently Used at Home none     Readmission within 30 days? No     Patient currently being followed by outpatient case management? No     Do you currently have service(s) that help you manage your care at home? No     Do you take prescription medications? Yes     Do you have prescription coverage? Yes     Coverage Payor: [employer] - [facility] [employer] -     Do you have any problems affording any of your prescribed medications? No     Is the patient taking medications as prescribed? yes     Who is going to help you get home at discharge? Cleo Begum (Spouse)  [phone]     How do you get to doctors appointments? car, drives self;family or friend will provide     Are you on dialysis? No     Do you take coumadin? No     Discharge Plan A Home with family     Discharge Plan B Home with family     Discharge Plan discussed with: Patient     Transition of Care Barriers None

## 2024-05-13 NOTE — ASSESSMENT & PLAN NOTE
Urinalysis reflex to urine culture  Follow Urine cultures  Start IV Ceftriaxone 1gm dly x 5 days  Trend lactic acid

## 2024-05-13 NOTE — CARE UPDATE
Patient with history of bladder cancer status post BCG treatment and resection, recurrent cystoscopy procedures last 1 on Friday, presents with dysuria and fatigue.  CT abdomen and pelvis showed findings consistent with pyelonephritis persistent hydroureteronephrosis seen on prior CT scan.  Patient empirically started on IV antibiotics.  Patient reportedly has follow up with Urology outpatient for surgical intervention in August for stricture..

## 2024-05-13 NOTE — H&P
[facility] - Emergency Dept  Hospital Medicine  History & Physical    Patient Name: Arcadio Begum  MRN: [id number]  Patient Class: IP- Inpatient  Admission Date: 5/12/2024  Attending Physician: Beny Patton MD   Primary Care Provider: [facility]         Patient information was obtained from patient, spouse/SO, and ER records.     Subjective:     Principal Problem:Sepsis without acute organ dysfunction    Chief Complaint:   Chief Complaint   Patient presents with    Abdominal Pain     Lower abd pain for 2 days, burning pain. Hx of bladder CA and UTI.        HPI: Arcadio Fitzgerald is a 78 y.o. male with PMH significant for Bladder cancer diagnosed In September 2023, and recurrent cystoscopy procedures presented with dysuria and fatigue of two days duration. Patient who was in his usual state of health until 2 days ago when he went for his cystoscopy procedure and reports feeling fatigued and having dysuria after the procedure. He has had three cystoscopy procedures since his bladder cancer diagnosis last September. Patient is being evaluated by the Hospital Medicine service  after developing significant fatigue and being unable to walk. Patient has had history of UTI and pyelonephritis after his bladder interventions and his most recent pyelonephritis was last month (April) where he was discharged home with Cefdinir. 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 Patient will be admitted to [facility] and consult to Urology.  Patient denies any headache or visual changes.  Denies any focal weakness, numbness tingling or paresthesias. Admits to generalized weakness. Denies any abdominal pain.  Denies melena hematochezia or any GI bleeding.  Denies diarrhea or constipation. Denies any cough or cold symptoms.  Denies dysuria,  frequency or urgency.  Denies any other problems or complaints.    Pt denies history of afib, denies personal hx of blood clots. He lives with his wife and performs all ADLs independently. Pt denies alcohol and denies recreational drug use, denies smoking.        Past Medical History:   Diagnosis Date    Left leg pain 03/07/2024       No past surgical history on file.    Review of patient's allergies indicates:  No Known Allergies    No current facility-administered medications on file prior to encounter.     Current Outpatient Medications on File Prior to Encounter   Medication Sig    acetaminophen (TYLENOL) 500 MG tablet Take 500 mg by mouth every 6 (six) hours as needed for Pain.    amLODIPine (NORVASC) 10 MG tablet Take 5 mg by mouth once daily.    aspirin (ECOTRIN) 81 MG EC tablet Take 81 mg by mouth once daily.    atorvastatin (LIPITOR) 80 MG tablet Take 80 mg by mouth once daily.    cholecalciferol, vitamin D3, (VITAMIN D3) 50 mcg (2,000 unit) Cap capsule Take 1 capsule by mouth every other day.    empagliflozin (JARDIANCE) 25 mg tablet Take 12.5 mg by mouth once daily.    ibuprofen (ADVIL,MOTRIN) 200 MG tablet Take 200 mg by mouth every 6 (six) hours as needed for Pain.    metoprolol succinate (TOPROL-XL) 25 MG 24 hr tablet Take 25 mg by mouth once daily.    oxyCODONE-acetaminophen (PERCOCET) 5-325 mg per tablet Take 1 tablet by mouth every 6 (six) hours as needed for Pain.     Family History    None       Tobacco Use    Smoking status: Not on file    Smokeless tobacco: Not on file   Substance and Sexual Activity    Alcohol use: Not Currently    Drug use: Not on file    Sexual activity: Not Currently     Review of Systems   Constitutional:  Positive for fatigue. Negative for chills, fever and unexpected weight change.   HENT:  Negative for ear pain, nosebleeds and sore throat.    Respiratory:  Negative for cough, shortness of breath and wheezing.    Cardiovascular:  Negative for chest pain, palpitations and  leg swelling.   Gastrointestinal:  Positive for abdominal pain. Negative for constipation, diarrhea, nausea and vomiting.   Genitourinary:  Positive for difficulty urinating and dysuria. Negative for frequency and urgency.   Musculoskeletal:  Negative for back pain, joint swelling and neck pain.   Skin:  Negative for pallor.   Allergic/Immunologic: Negative for food allergies.   Neurological:  Negative for dizziness, tremors, seizures, syncope, speech difficulty, weakness, numbness and headaches.   Psychiatric/Behavioral:  Negative for behavioral problems and sleep disturbance.    All other systems reviewed and are negative.    Objective:     Vital Signs (Most Recent):  Temp: 99.1 °F (37.3 °C) (05/12/24 2220)  Pulse: 62 (05/13/24 0403)  Resp: (!) 24 (05/13/24 0403)  BP: 133/64 (05/13/24 0403)  SpO2: (!) 92 % (05/13/24 0403) Vital Signs (24h Range):  Temp:  [99.1 °F (37.3 °C)-99.3 °F (37.4 °C)] 99.1 °F (37.3 °C)  Pulse:  [61-75] 62  Resp:  [13-30] 24  SpO2:  [92 %-98 %] 92 %  BP: (133-165)/(64-89) 133/64     Weight: 86.6 kg (190 lb 14.7 oz)  Body mass index is 30.81 kg/m².     Physical Exam  Constitutional:       Appearance: Normal appearance.   HENT:      Head: Normocephalic and atraumatic.      Nose: Nose normal.   Eyes:      Extraocular Movements: Extraocular movements intact.      Pupils: Pupils are equal, round, and reactive to light.   Cardiovascular:      Rate and Rhythm: Normal rate and regular rhythm.      Pulses: Normal pulses.      Heart sounds: Normal heart sounds.   Pulmonary:      Effort: Pulmonary effort is normal.      Breath sounds: Normal breath sounds.   Abdominal:      General: Abdomen is flat. Bowel sounds are normal.      Palpations: Abdomen is soft.      Tenderness: There is abdominal tenderness. There is right CVA tenderness.   Musculoskeletal:         General: Normal range of motion.      Cervical back: Normal range of motion.   Skin:     General: Skin is warm and dry.      Capillary Refill:  Capillary refill takes less than 2 seconds.   Neurological:      General: No focal deficit present.      Mental Status: He is alert.      Comments: Keeps eyes closed throughout the interview, keeps answers short, wife answers most of the questions   Psychiatric:         Mood and Affect: Mood normal.              CRANIAL NERVES     CN III, IV, VI   Pupils are equal, round, and reactive to light.       Significant Labs: All pertinent labs within the past 24 hours have been reviewed.    Significant Imaging: I have reviewed all pertinent imaging results/findings within the past 24 hours.  Assessment/Plan:     * Sepsis without acute organ dysfunction  Urinalysis reflex to urine culture  Follow Urine cultures  Start IV Ceftriaxone 1gm dly x 5 days  Trend lactic acid        Pyelonephritis    Urinalysis reflex to urine culture  Follow Urine cultures  Start IV Ceftriaxone 1gm dly  Trend lactic acid       Urinary retention  Bladder scan q6hrs  Urology consult for oliver if >300cc of urine in bladder      Primary hypertension  Chronic, controlled. Latest blood pressure and vitals reviewed-     Temp:  [99.1 °F (37.3 °C)-99.3 °F (37.4 °C)]   Pulse:  [61-75]   Resp:  [13-30]   BP: (133-165)/(64-89)   SpO2:  [92 %-98 %] .   Home meds for hypertension were reviewed and noted below.   Hypertension Medications               amLODIPine (NORVASC) 10 MG tablet Take 5 mg by mouth once daily.    metoprolol succinate (TOPROL-XL) 25 MG 24 hr tablet Take 25 mg by mouth once daily.            While in the hospital, will manage blood pressure as follows; Continue home antihypertensive regimen    Will utilize p.r.n. blood pressure medication only if patient's blood pressure greater than 180/110 and he develops symptoms such as worsening chest pain or shortness of breath.    S/P TURP  Hx of in August 2023      Type 2 diabetes mellitus without complication, without long-term current use of insulin  Patient's FSGs are uncontrolled due to  "hyperglycemia on current medication regimen.  Last A1c reviewed-   Lab Results   Component Value Date    HGBA1C 8.5 (H) 03/08/2024     Most recent fingerstick glucose reviewed- No results for input(s): "POCTGLUCOSE" in the last 24 hours.  Current correctional scale  Medium  Maintain anti-hyperglycemic dose as follows-   Antihyperglycemics (From admission, onward)      None        Start Insulin Sliding scale  Start Lantus 10 units SQ QHS  Hold Oral hypoglycemics while patient is in the hospital.    Bladder cancer  Diagnosed in 2023  Has had 3 cystoscope procedures since then  Needs revision surgery done in August 2024        VTE Risk Mitigation (From admission, onward)           Ordered     enoxaparin injection 40 mg  Every 24 hours         05/13/24 0157     IP VTE HIGH RISK PATIENT  Once         05/13/24 0156     Place sequential compression device  Until discontinued         05/13/24 0156                                    Beny Patton MD  Department of Hospital Medicine  [facility] - Emergency Dept          "

## 2024-05-13 NOTE — HPI
Arcadio Fitzgerald is a 78 y.o. male with PMH significant for Bladder cancer diagnosed In September 2023, and recurrent cystoscopy procedures presented with dysuria and fatigue of two days duration. Patient who was in his usual state of health until 2 days ago when he went for his cystoscopy procedure and reports feeling fatigued and having dysuria after the procedure. He has had three cystoscopy procedures since his bladder cancer diagnosis last September. Patient is being evaluated by the Hospital Medicine service  after developing significant fatigue and being unable to walk. Patient has had history of UTI and pyelonephritis after his bladder interventions and his most recent pyelonephritis was last month (April) where he was discharged home with Cefdinir. 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 Patient will be admitted to [facility] and consult to Urology.  Patient denies any headache or visual changes.  Denies any focal weakness, numbness tingling or paresthesias. Admits to generalized weakness. Denies any abdominal pain.  Denies melena hematochezia or any GI bleeding.  Denies diarrhea or constipation. Denies any cough or cold symptoms.  Denies dysuria, frequency or urgency.  Denies any other problems or complaints.    Pt denies history of afib, denies personal hx of blood clots. He lives with his wife and performs all ADLs independently. Pt denies alcohol and denies recreational drug use, denies smoking.

## 2024-05-14 LAB
ALBUMIN SERPL BCP-MCNC: 4 G/DL (ref 3.5–5.2)
ALP SERPL-CCNC: 67 U/L (ref 55–135)
ALT SERPL W/O P-5'-P-CCNC: 7 U/L (ref 10–44)
ANION GAP SERPL CALC-SCNC: 10 MMOL/L (ref 8–16)
AST SERPL-CCNC: 8 U/L (ref 10–40)
BASOPHILS # BLD AUTO: 0.06 K/UL (ref 0–0.2)
BASOPHILS NFR BLD: 0.7 % (ref 0–1.9)
BILIRUB SERPL-MCNC: 0.6 MG/DL (ref 0.1–1)
BUN SERPL-MCNC: 16 MG/DL (ref 8–23)
CALCIUM SERPL-MCNC: 9.2 MG/DL (ref 8.7–10.5)
CHLORIDE SERPL-SCNC: 104 MMOL/L (ref 95–110)
CO2 SERPL-SCNC: 22 MMOL/L (ref 23–29)
CREAT SERPL-MCNC: 0.8 MG/DL (ref 0.5–1.4)
DIFFERENTIAL METHOD BLD: ABNORMAL
EOSINOPHIL # BLD AUTO: 0.2 K/UL (ref 0–0.5)
EOSINOPHIL NFR BLD: 1.7 % (ref 0–8)
ERYTHROCYTE [DISTWIDTH] IN BLOOD BY AUTOMATED COUNT: 13.7 % (ref 11.5–14.5)
EST. GFR  (NO RACE VARIABLE): >60 ML/MIN/1.73 M^2
GLUCOSE SERPL-MCNC: 104 MG/DL (ref 70–110)
GLUCOSE SERPL-MCNC: 111 MG/DL (ref 70–110)
GLUCOSE SERPL-MCNC: 111 MG/DL (ref 70–110)
GLUCOSE SERPL-MCNC: 121 MG/DL (ref 70–110)
GLUCOSE SERPL-MCNC: 171 MG/DL (ref 70–110)
HCT VFR BLD AUTO: 41.4 % (ref 40–54)
HGB BLD-MCNC: 13.7 G/DL (ref 14–18)
IMM GRANULOCYTES # BLD AUTO: 0.03 K/UL (ref 0–0.04)
IMM GRANULOCYTES NFR BLD AUTO: 0.3 % (ref 0–0.5)
LYMPHOCYTES # BLD AUTO: 1.5 K/UL (ref 1–4.8)
LYMPHOCYTES NFR BLD: 16.3 % (ref 18–48)
MCH RBC QN AUTO: 28 PG (ref 27–31)
MCHC RBC AUTO-ENTMCNC: 33.1 G/DL (ref 32–36)
MCV RBC AUTO: 85 FL (ref 82–98)
MONOCYTES # BLD AUTO: 1.3 K/UL (ref 0.3–1)
MONOCYTES NFR BLD: 14.6 % (ref 4–15)
NEUTROPHILS # BLD AUTO: 6 K/UL (ref 1.8–7.7)
NEUTROPHILS NFR BLD: 66.4 % (ref 38–73)
NRBC BLD-RTO: 0 /100 WBC
PLATELET # BLD AUTO: 241 K/UL (ref 150–450)
PMV BLD AUTO: 10.3 FL (ref 9.2–12.9)
POTASSIUM SERPL-SCNC: 3.6 MMOL/L (ref 3.5–5.1)
PROT SERPL-MCNC: 7.2 G/DL (ref 6–8.4)
RBC # BLD AUTO: 4.9 M/UL (ref 4.6–6.2)
SODIUM SERPL-SCNC: 136 MMOL/L (ref 136–145)
WBC # BLD AUTO: 9 K/UL (ref 3.9–12.7)

## 2024-05-14 PROCEDURE — 25000003 PHARM REV CODE 250: Performed by: STUDENT IN AN ORGANIZED HEALTH CARE EDUCATION/TRAINING PROGRAM

## 2024-05-14 PROCEDURE — 63600175 PHARM REV CODE 636 W HCPCS: Performed by: STUDENT IN AN ORGANIZED HEALTH CARE EDUCATION/TRAINING PROGRAM

## 2024-05-14 PROCEDURE — 51798 US URINE CAPACITY MEASURE: CPT

## 2024-05-14 PROCEDURE — 85025 COMPLETE CBC W/AUTO DIFF WBC: CPT | Performed by: INTERNAL MEDICINE

## 2024-05-14 PROCEDURE — 97161 PT EVAL LOW COMPLEX 20 MIN: CPT

## 2024-05-14 PROCEDURE — 80053 COMPREHEN METABOLIC PANEL: CPT | Performed by: INTERNAL MEDICINE

## 2024-05-14 PROCEDURE — 25000003 PHARM REV CODE 250: Performed by: UROLOGY

## 2024-05-14 PROCEDURE — 36415 COLL VENOUS BLD VENIPUNCTURE: CPT | Performed by: INTERNAL MEDICINE

## 2024-05-14 PROCEDURE — 97165 OT EVAL LOW COMPLEX 30 MIN: CPT

## 2024-05-14 PROCEDURE — 94760 N-INVAS EAR/PLS OXIMETRY 1: CPT

## 2024-05-14 PROCEDURE — 25000003 PHARM REV CODE 250: Performed by: INTERNAL MEDICINE

## 2024-05-14 PROCEDURE — 12000002 HC ACUTE/MED SURGE SEMI-PRIVATE ROOM

## 2024-05-14 PROCEDURE — 99449 NTRPROF PH1/NTRNET/EHR 31/>: CPT | Mod: ,,, | Performed by: UROLOGY

## 2024-05-14 PROCEDURE — 99900031 HC PATIENT EDUCATION (STAT)

## 2024-05-14 PROCEDURE — 63600175 PHARM REV CODE 636 W HCPCS: Performed by: INTERNAL MEDICINE

## 2024-05-14 RX ORDER — TAMSULOSIN HYDROCHLORIDE 0.4 MG/1
0.8 CAPSULE ORAL NIGHTLY
Status: DISCONTINUED | OUTPATIENT
Start: 2024-05-14 | End: 2024-05-15 | Stop reason: HOSPADM

## 2024-05-14 RX ADMIN — AMPICILLIN SODIUM 2 G: 2 INJECTION, POWDER, FOR SOLUTION INTRAMUSCULAR; INTRAVENOUS at 05:05

## 2024-05-14 RX ADMIN — OXYCODONE HYDROCHLORIDE AND ACETAMINOPHEN 1 TABLET: 5; 325 TABLET ORAL at 01:05

## 2024-05-14 RX ADMIN — METOPROLOL SUCCINATE 25 MG: 25 TABLET, EXTENDED RELEASE ORAL at 08:05

## 2024-05-14 RX ADMIN — ASPIRIN 81 MG: 81 TABLET, COATED ORAL at 08:05

## 2024-05-14 RX ADMIN — ENOXAPARIN SODIUM 40 MG: 40 INJECTION SUBCUTANEOUS at 05:05

## 2024-05-14 RX ADMIN — OXYCODONE HYDROCHLORIDE AND ACETAMINOPHEN 1 TABLET: 5; 325 TABLET ORAL at 08:05

## 2024-05-14 RX ADMIN — AMPICILLIN SODIUM 2 G: 2 INJECTION, POWDER, FOR SOLUTION INTRAMUSCULAR; INTRAVENOUS at 10:05

## 2024-05-14 RX ADMIN — AMPICILLIN SODIUM 2 G: 2 INJECTION, POWDER, FOR SOLUTION INTRAMUSCULAR; INTRAVENOUS at 09:05

## 2024-05-14 RX ADMIN — AMLODIPINE BESYLATE 5 MG: 5 TABLET ORAL at 08:05

## 2024-05-14 RX ADMIN — ATORVASTATIN CALCIUM 80 MG: 40 TABLET, FILM COATED ORAL at 08:05

## 2024-05-14 RX ADMIN — TAMSULOSIN HYDROCHLORIDE 0.8 MG: 0.4 CAPSULE ORAL at 09:05

## 2024-05-14 RX ADMIN — AMPICILLIN SODIUM 2 G: 2 INJECTION, POWDER, FOR SOLUTION INTRAMUSCULAR; INTRAVENOUS at 01:05

## 2024-05-14 NOTE — PT/OT/SLP EVAL
Occupational Therapy   Evaluation and Discharge Note    Name: Arcadio Begum  MRN: [id number]  Admitting Diagnosis: Pyelonephritis  Recent Surgery: * No surgery found *      Recommendations:     Discharge Recommendations: No Therapy Indicated  Discharge Equipment Recommendations: none  Barriers to discharge:  None    Assessment:     Arcadio Begum is a 78 y.o. male with a medical diagnosis of Pyelonephritis. At this time, patient is functioning at their prior level of function and does not require further acute OT services.     Plan:     During this hospitalization, patient does not require further acute OT services.  Please re-consult if situation changes.    Plan of Care Reviewed with: patient    Subjective     Occupational Profile:  Living Environment:  Lives with spouse, SSH, 1STE; tub/shower combination  Previous level of function: Independent; drives  Roles and Routines: retired  Equipment Used at home: none  Assistance upon Discharge: wife    Pain/Comfort:  Pain Rating 1: 0/10  Pain Rating Post-Intervention 1: 0/10      Objective:     Communicated with: nurse prior to session.  Patient found supine with peripheral IV upon OT entry to room.    General Precautions: Standard, fall  Orthopedic Precautions: N/A  Braces: N/A  Respiratory Status: Room air     Occupational Performance:    Bed Mobility:    Patient completed Supine to Sit with independence  Patient completed Sit to Supine with independence    Functional Mobility/Transfers:  Pt declined; reported no concerns     Activities of Daily Living:  Lower Body Dressing: independence      Cognitive/Visual Perceptual:  Cognitive/Psychosocial Skills:     -       Oriented to: Person, Place, Time, and Situation   -       Follows Commands/attention:Follows multistep  commands  -       Communication: clear/fluent    Physical Exam:  Upper Extremity Range of Motion:     -       Right Upper Extremity: WFL  -       Left Upper Extremity: WFL  Upper Extremity Strength:    -        Right Upper Extremity: WFL  -       Left Upper Extremity: WFL   Strength:    -       Right Upper Extremity: WFL  -       Left Upper Extremity: WFL  Fine Motor Coordination:    -       Intact    AMPAC 6 Click ADL:  AMPAC Total Score: 24    Treatment & Education:  Pt educated on OT role/discharge    Patient left HOB elevated with all lines intact and call button in reach    History:     Past Medical History:   Diagnosis Date    Left leg pain 03/07/2024       No past surgical history on file.    Time Tracking:     OT Date of Treatment: 05/14/24  OT Start Time: 1409  OT Stop Time: 1417  OT Total Time (min): 8 min    Billable Minutes:Evaluation 08 5/14/2024

## 2024-05-14 NOTE — ASSESSMENT & PLAN NOTE
>>ASSESSMENT AND PLAN FOR PYELONEPHRITIS WRITTEN ON 5/14/2024  3:18 PM BY YOUSIF DEAN MD    Patient placed on Ampicillin IV    >>ASSESSMENT AND PLAN FOR SEPSIS WITHOUT ACUTE ORGAN DYSFUNCTION WRITTEN ON 5/14/2024  3:18 PM BY YOUSIF DEAN MD    Urinalysis reflex to urine culture  Follow Urine cultures  Start IV Ceftriaxone 1gm dly x 5 days  Trend lactic acid

## 2024-05-14 NOTE — CONSULTS
Interprofessional Telephone CONSULT Note  Date of Service: 05/14/2024  Patient's location: [facility]     Specialty Consulted: Urology  Staff Consulted: Clarissa Brady MD  Reason for Consult: elevated urien residual     Time spent reviewing records, making plan and formulating plan: 35 min. More than half the time was devoted to medical consultative verbal/internet discussion with nursing and consulting physician. The purpose of this note was to treat and evaluate patient and not to arrange a transfer of care or other face to face service.       This service was not originating from a related E/M service provided within the previous 7 days nor will  to an E/M service or procedure within the next 24 hours or my soonest available appointment.      Both a written plan and a verbal/internet discussion were discussed with the following physician consultant: dr imani MD          [facility], formerly known as [facility]  Group MD's:Jose Antonio/Beny/Ralf/Larry Tang NP's: Jaz Mcguire    [facility]  Date of Service: 05/14/2024  Today's note written by: MD Jose Antonio          Subjective:      Initial consult by me in hospital on 5/14/24:  Arcadio Begum is a 78 y.o. male with h/o bladder cancer and elevated urine residuals. [facility] cysto last Friday 5 days ago. H/o bcg reaction. Last bcg 3 weeks ago. Has h/o pyelo and uti after cystos in past.     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 However he just had cysto last Friday showing no stricture. Per dr cross who I spoke with he is scheduled for turp.     Came to er on 5/12/24 for ftiggue and dysuria.  Ucx growing.  Pvr's have been in the 400s. Voiding 100s  Recommend  16fr coude to maximally drain bladded and kidneys but pt refusing.                 LABS REVIEW:  Recent labs:   Recent Labs   Lab 05/12/24  1854 05/13/24  0557 05/14/24  0802   WBC 9.68 9.44 9.00   Hemoglobin 13.9 L 12.9 L 13.7 L   Hematocrit 42.7 39.1 L 41.4   Platelets 225 208 241   ]  Recent Labs   Lab 03/09/24  0532 03/10/24  0622 05/12/24  1854 05/13/24  0557 05/14/24  0802   Sodium 139 140 136 136 136   Potassium 3.7 4.6 3.9 3.9 3.6   Chloride 108 108 105 106 104   CO2 21 L 26 22 L 23 22 L   BUN 15 16 18 17 16   Creatinine 0.9 1.0 0.9 0.9 0.8   Glucose 129 H 125 H 122 H 107 121 H   Calcium 8.6 L 9.3 9.2 8.8 9.2   Magnesium 2.0 2.0 2.0  --   --    Phosphorus  --   --  2.7  --   --    Alkaline Phosphatase 74 72 83 66 67   Total Protein 6.4 6.9 7.4 6.6 7.2   Albumin 3.4 L 3.6 4.2 3.7 4.0   Total Bilirubin 0.5 0.4 0.7 0.8 0.6   AST 10 9 L 9 L 8 L 8 L   ALT 8 L 9 L 8 L 6 L 7 L   ]    Urine history:    Urine Culture   8/24/2023 PSEUDOMONAS AERUGINOSA !    8/27/2023 PSEUDOMONAS AERUGINOSA !    2/22/2024 STREPTOCOCCUS AGALACTIAE (GROUP B) !    3/7/2024 No growth    5/12/2024 ENTEROCOCCUS SPECIES ! (P)       PSA history:   Lab Results   Component Value Date    PSA 0.8 10/25/2007         Current REVIEW OF SYSTEMS:  Negative for the remaining 12 points of ROS except for as stated above       PMHx:  Past Medical History:   Diagnosis Date    Left leg pain 03/07/2024       PSHx:  No past surgical history on file.    Fam Hx:  Reviewed- pertinent family hx as above    Soc Hx:  Social History     Substance Use Topics    Alcohol use: Not Currently       Allergies:  Patient has no known allergies.    Anticoagulation/Aspirin: none    Objective:     Vitals:    05/14/24 0904   BP:    Pulse: (!) 59   Resp: 16   Temp:            Pertinent urologic PATHOLOGY AND RADIOLOGY REVIEW:  See hpi for recent      Assessment:     Arcadio Begum is a 78 y.o. male with   1. Sepsis        urology consulted for:  L hydro secondary to elevated  urine residuals   Pyelo secondary to L hydro and incomplete emptying  H/o bladder cancer     Plan:     Recommend indwelling oliver to maximally drain kidney  Yes he has a h/o stricture in the past but just had cysto by  who confirmed cystoscope went easily  They plan to do a turp  If refusing oliver needs to fu soon with  to discuss turp and benefits of oliver again.  Ultimatley will lose function of L kd and continue to have recurrent pyelo with backup of infected urine to left kidney      Clarissa Brady MD

## 2024-05-14 NOTE — PROGRESS NOTES
[facility] Medicine  Progress Note    Patient Name: Arcadio Begum  MRN: [id number]  Patient Class: IP- Inpatient   Admission Date: 5/12/2024  Length of Stay: 1 days  Attending Physician: Hari Ramirez MD  Primary Care Provider: [facility]        Subjective:     Principal Problem:Pyelonephritis        HPI:  Arcadio Fitzgerald is a 78 y.o. male with PMH significant for Bladder cancer diagnosed In September 2023, and recurrent cystoscopy procedures presented with dysuria and fatigue of two days duration. Patient who was in his usual state of health until 2 days ago when he went for his cystoscopy procedure and reports feeling fatigued and having dysuria after the procedure. He has had three cystoscopy procedures since his bladder cancer diagnosis last September. Patient is being evaluated by the Hospital Medicine service  after developing significant fatigue and being unable to walk. Patient has had history of UTI and pyelonephritis after his bladder interventions and his most recent pyelonephritis was last month (April) where he was discharged home with Cefdinir. 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 Patient will be admitted to [facility] and consult to Urology.  Patient denies any headache or visual changes.  Denies any focal weakness, numbness tingling or paresthesias. Admits to generalized weakness. Denies any abdominal pain.  Denies melena hematochezia or any GI bleeding.  Denies diarrhea or constipation. Denies any cough or cold symptoms.  Denies dysuria, frequency or urgency.  Denies any other problems or complaints.    Pt denies history of afib, denies personal hx of blood clots. He lives with his wife and performs all ADLs independently. Pt denies alcohol and denies recreational drug use, denies  smoking.        Overview/Hospital Course:  No notes on file    Interval History:   Patient is doing well, has no concerns    Review of Systems   Constitutional:  Negative for activity change, appetite change and fatigue.   HENT:  Negative for rhinorrhea, sinus pressure and sinus pain.    Eyes:  Negative for photophobia, redness and visual disturbance.   Respiratory:  Negative for apnea, choking, shortness of breath and wheezing.    Cardiovascular:  Negative for chest pain, palpitations and leg swelling.   Gastrointestinal:  Negative for abdominal pain, diarrhea, nausea and vomiting.   Endocrine: Negative for polydipsia, polyphagia and polyuria.   Genitourinary:  Negative for dysuria and hematuria.   Musculoskeletal:  Negative for back pain, neck pain and neck stiffness.   Skin:  Negative for pallor, rash and wound.   Neurological:  Negative for dizziness, numbness and headaches.   Psychiatric/Behavioral:  Negative for agitation and confusion. The patient is not nervous/anxious.      Objective:     Vital Signs (Most Recent):  Temp: 98 °F (36.7 °C) (05/14/24 1127)  Pulse: (!) 52 (05/14/24 1127)  Resp: 16 (05/14/24 1353)  BP: 130/74 (05/14/24 1127)  SpO2: (!) 94 % (05/14/24 1127) Vital Signs (24h Range):  Temp:  [98 °F (36.7 °C)-98.3 °F (36.8 °C)] 98 °F (36.7 °C)  Pulse:  [52-68] 52  Resp:  [16-20] 16  SpO2:  [94 %-97 %] 94 %  BP: (118-143)/(67-77) 130/74     Weight: 90.5 kg (199 lb 8.3 oz)  Body mass index is 32.2 kg/m².    Intake/Output Summary (Last 24 hours) at 5/14/2024 1514  Last data filed at 5/14/2024 1419  Gross per 24 hour   Intake 250 ml   Output 1350 ml   Net -1100 ml         Physical Exam  Constitutional:       General: He is not in acute distress.     Appearance: He is not ill-appearing, toxic-appearing or diaphoretic.   HENT:      Head: Normocephalic and atraumatic.      Right Ear: External ear normal.      Left Ear: External ear normal.      Nose: No congestion or rhinorrhea.   Eyes:      General: No  scleral icterus.        Right eye: No discharge.         Left eye: No discharge.   Cardiovascular:      Heart sounds: No murmur heard.  Pulmonary:      Effort: No respiratory distress.      Breath sounds: No stridor. No wheezing, rhonchi or rales.   Chest:      Chest wall: No tenderness.   Abdominal:      General: There is no distension.      Palpations: There is no mass.      Tenderness: There is no abdominal tenderness. There is no guarding or rebound.      Hernia: No hernia is present.   Musculoskeletal:         General: No swelling, tenderness, deformity or signs of injury.      Right lower leg: No edema.      Left lower leg: No edema.   Skin:     Coloration: Skin is not jaundiced or pale.      Findings: No bruising, erythema, lesion or rash.   Neurological:      Mental Status: He is alert and oriented to person, place, and time. Mental status is at baseline.      Motor: No weakness.             Significant Labs: All pertinent labs within the past 24 hours have been reviewed.  CBC:   Recent Labs   Lab 05/12/24  1854 05/13/24  0557 05/14/24  0802   WBC 9.68 9.44 9.00   HGB 13.9* 12.9* 13.7*   HCT 42.7 39.1* 41.4    208 241     CMP:   Recent Labs   Lab 05/12/24  1854 05/13/24  0557 05/14/24  0802    136 136   K 3.9 3.9 3.6    106 104   CO2 22* 23 22*   * 107 121*   BUN 18 17 16   CREATININE 0.9 0.9 0.8   CALCIUM 9.2 8.8 9.2   PROT 7.4 6.6 7.2   ALBUMIN 4.2 3.7 4.0   BILITOT 0.7 0.8 0.6   ALKPHOS 83 66 67   AST 9* 8* 8*   ALT 8* 6* 7*   ANIONGAP 9 7* 10       Significant Imaging: I have reviewed all pertinent imaging results/findings within the past 24 hours.    Assessment/Plan:      * Pyelonephritis  >>ASSESSMENT AND PLAN FOR SEPSIS WITHOUT ACUTE ORGAN DYSFUNCTION WRITTEN ON 5/13/2024  5:49 AM BY JACKIE MELVIN MD    Urinalysis reflex to urine culture  Follow Urine cultures  Started Patient on Ampicillin IV  Trend lactic acid        Primary hypertension  Chronic, controlled. Latest  "blood pressure and vitals reviewed-     Temp:  [99.1 °F (37.3 °C)-99.3 °F (37.4 °C)]   Pulse:  [61-75]   Resp:  [13-30]   BP: (133-165)/(64-89)   SpO2:  [92 %-98 %] .   Home meds for hypertension were reviewed and noted below.   Hypertension Medications               amLODIPine (NORVASC) 10 MG tablet Take 5 mg by mouth once daily.    metoprolol succinate (TOPROL-XL) 25 MG 24 hr tablet Take 25 mg by mouth once daily.            While in the hospital, will manage blood pressure as follows; Continue home antihypertensive regimen    Will utilize p.r.n. blood pressure medication only if patient's blood pressure greater than 180/110 and he develops symptoms such as worsening chest pain or shortness of breath.    Type 2 diabetes mellitus without complication, without long-term current use of insulin  Patient's FSGs are uncontrolled due to hyperglycemia on current medication regimen.  Last A1c reviewed-   Lab Results   Component Value Date    HGBA1C 8.5 (H) 03/08/2024     Most recent fingerstick glucose reviewed- No results for input(s): "POCTGLUCOSE" in the last 24 hours.  Current correctional scale  Medium  Maintain anti-hyperglycemic dose as follows-   Antihyperglycemics (From admission, onward)      None        Start Insulin Sliding scale  Start Lantus 10 units SQ QHS  Hold Oral hypoglycemics while patient is in the hospital.    Bladder cancer  Diagnosed in 2023  Has had 3 cystoscope procedures since then  Needs revision surgery done in August 2024      Urinary retention  Bladder scan q6hrs  Urology consult for oliver if >300cc of urine in bladder      S/P TURP  Hx of in August 2023        VTE Risk Mitigation (From admission, onward)           Ordered     enoxaparin injection 40 mg  Every 24 hours         05/13/24 0157     IP VTE HIGH RISK PATIENT  Once         05/13/24 0156     Place sequential compression device  Until discontinued         05/13/24 0156                    Discharge Planning   KRISTAN: 5/16/2024     Code " Status: Full Code   Is the patient medically ready for discharge?:     Reason for patient still in hospital (select all that apply): Treatment  Discharge Plan A: Home with family                  Hari Ramirez MD  Department of Hospital Medicine   [facility]     [employer]

## 2024-05-14 NOTE — PT/OT/SLP EVAL
Physical Therapy Evaluation and Discharge Note    Patient Name:  Arcadio Begum   MRN:  [id number]    Recommendations:     Discharge Recommendations: No Therapy Indicated  Discharge Equipment Recommendations: none   Barriers to discharge: None    Assessment:     Arcadio Begum is a 78 y.o. male admitted with a medical diagnosis of Sepsis without acute organ dysfunction. .  At this time, patient is functioning at their prior level of function and does not require further acute PT services.     Recent Surgery: * No surgery found *      Plan:     During this hospitalization, patient does not require further acute PT services.  Please re-consult if situation changes.      Subjective     Chief Complaint: LBP  Patient/Family Comments/goals: does not want catheter  Pain/Comfort:  Pain Rating 1: 4/10  Location - Side 1: Bilateral  Location - Orientation 1: lower  Location 1: back  Pain Addressed 1: Nurse notified  Pain Rating Post-Intervention 1: 4/10    Patients cultural, spiritual, Buddhism conflicts given the current situation: no    Living Environment:  Lives w spouse in [facility], [address]  Prior to admission, patients level of function was independent.  Equipment used at home: none.  DME owned (not currently used): none.  Upon discharge, patient will have assistance from spouse.    Objective:     Communicated with nurse, Gilda, prior to session.  Patient found HOB elevated with telemetry, peripheral IV upon PT entry to room.    General Precautions: Standard, diabetic    Orthopedic Precautions:N/A   Braces: N/A  Respiratory Status: Room air    Exams:  Cognitive Exam:  Patient is oriented to Person, Place, Time, and Situation  Gross Motor Coordination:  WFL  RLE Strength: WFL  LLE Strength: WFL    Functional Mobility:  Bed Mobility:     Supine to Sit: independence  Sit to Supine: independence  Transfers:     Sit to Stand:  independence with no AD  Gait: ambulated 300 ft withotu AD, good dileep, steady  Balance:  WFL    AM-PAC 6 CLICK MOBILITY  Total Score:24       Treatment and Education:  Pt educated in PT roles and goals, DC recommendations, safety with mobility,use of call light, fall prevention    AM-PAC 6 CLICK MOBILITY  Total Score:24     Patient left sitting edge of bed with all lines intact, call button in reach, and nurse notified.    GOALS:   Multidisciplinary Problems       Physical Therapy Goals       Not on file                    History:     Past Medical History:   Diagnosis Date    Left leg pain 03/07/2024       No past surgical history on file.    Time Tracking:     PT Received On: 05/14/24  PT Start Time: 1305     PT Stop Time: 1313  PT Total Time (min): 8 min     Billable Minutes: Evaluation 8      05/14/2024

## 2024-05-14 NOTE — ASSESSMENT & PLAN NOTE
>>ASSESSMENT AND PLAN FOR SEPSIS WITHOUT ACUTE ORGAN DYSFUNCTION WRITTEN ON 5/13/2024  5:49 AM BY JACKIE MELVIN MD    Urinalysis reflex to urine culture  Follow Urine cultures  Started Patient on Ampicillin IV  Trend lactic acid

## 2024-05-14 NOTE — CARE UPDATE
05/14/24 0904   Patient Assessment/Suction   Level of Consciousness (AVPU) alert   Respiratory Effort Normal;Unlabored   Expansion/Accessory Muscles/Retractions no use of accessory muscles   All Lung Fields Breath Sounds clear;diminished   Rhythm/Pattern, Respiratory unlabored   Cough Frequency no cough   PRE-TX-O2   Device (Oxygen Therapy) room air   SpO2 95 %   Pulse Oximetry Type Intermittent   $ Pulse Oximetry - Single Charge Pulse Oximetry - Single   Pulse (!) 59   Resp 16   Education   $ Education Other (see comment);15 min  (sats)

## 2024-05-14 NOTE — CARE UPDATE
05/13/24 2035   Patient Assessment/Suction   Level of Consciousness (AVPU) alert   Respiratory Effort Normal;Unlabored   Expansion/Accessory Muscles/Retractions subcostal retractions;no retractions;no use of accessory muscles   PRE-TX-O2   Device (Oxygen Therapy) room air   SpO2 95 %   Pulse Oximetry Type Intermittent   $ Pulse Oximetry - Single Charge Pulse Oximetry - Single        Ck7 - Negative Histology Text: CK staining demonstrates a normal density and pattern.

## 2024-05-14 NOTE — NURSING
"2302- UOP of 150ml. Pvr 450. Per md note place urology consult for oliver. Consult in. Scheduled to call at 0600. Discussed with patient regarding his residual and that we would need to do a In & out. The patient refused adamantly and his reason as follows " everytime I get one of those or a oliver. I get septic. I come feeling one way and leave worse off then I came. Last time they did that I couldn't walk the next day. No I do not you to cath me." Educated patient and verbalized understanding.    0101-UOP 125ml. PvR 450. Discussed need for cath again. Refused.     0322- UOP 100ml. PvR 505. Discussed need for cath. Refused .  "

## 2024-05-15 VITALS
SYSTOLIC BLOOD PRESSURE: 105 MMHG | HEART RATE: 53 BPM | DIASTOLIC BLOOD PRESSURE: 66 MMHG | OXYGEN SATURATION: 93 % | TEMPERATURE: 98 F | HEIGHT: 66 IN | RESPIRATION RATE: 17 BRPM | WEIGHT: 199.5 LBS | BODY MASS INDEX: 32.06 KG/M2

## 2024-05-15 LAB
ALBUMIN SERPL BCP-MCNC: 3.6 G/DL (ref 3.5–5.2)
ALP SERPL-CCNC: 66 U/L (ref 55–135)
ALT SERPL W/O P-5'-P-CCNC: 11 U/L (ref 10–44)
ANION GAP SERPL CALC-SCNC: 9 MMOL/L (ref 8–16)
AST SERPL-CCNC: 10 U/L (ref 10–40)
BACTERIA UR CULT: ABNORMAL
BASOPHILS # BLD AUTO: 0.06 K/UL (ref 0–0.2)
BASOPHILS NFR BLD: 0.8 % (ref 0–1.9)
BILIRUB SERPL-MCNC: 0.5 MG/DL (ref 0.1–1)
BUN SERPL-MCNC: 16 MG/DL (ref 8–23)
CALCIUM SERPL-MCNC: 8.9 MG/DL (ref 8.7–10.5)
CHLORIDE SERPL-SCNC: 105 MMOL/L (ref 95–110)
CO2 SERPL-SCNC: 25 MMOL/L (ref 23–29)
CREAT SERPL-MCNC: 0.8 MG/DL (ref 0.5–1.4)
CRP SERPL-MCNC: 9.4 MG/DL
DIFFERENTIAL METHOD BLD: ABNORMAL
EOSINOPHIL # BLD AUTO: 0.2 K/UL (ref 0–0.5)
EOSINOPHIL NFR BLD: 3.2 % (ref 0–8)
ERYTHROCYTE [DISTWIDTH] IN BLOOD BY AUTOMATED COUNT: 13.5 % (ref 11.5–14.5)
EST. GFR  (NO RACE VARIABLE): >60 ML/MIN/1.73 M^2
GLUCOSE SERPL-MCNC: 109 MG/DL (ref 70–110)
GLUCOSE SERPL-MCNC: 110 MG/DL (ref 70–110)
GLUCOSE SERPL-MCNC: 123 MG/DL (ref 70–110)
HCT VFR BLD AUTO: 38.3 % (ref 40–54)
HGB BLD-MCNC: 12.7 G/DL (ref 14–18)
IMM GRANULOCYTES # BLD AUTO: 0.03 K/UL (ref 0–0.04)
IMM GRANULOCYTES NFR BLD AUTO: 0.4 % (ref 0–0.5)
LYMPHOCYTES # BLD AUTO: 1.7 K/UL (ref 1–4.8)
LYMPHOCYTES NFR BLD: 22.9 % (ref 18–48)
MCH RBC QN AUTO: 28 PG (ref 27–31)
MCHC RBC AUTO-ENTMCNC: 33.2 G/DL (ref 32–36)
MCV RBC AUTO: 85 FL (ref 82–98)
MONOCYTES # BLD AUTO: 1.1 K/UL (ref 0.3–1)
MONOCYTES NFR BLD: 14.7 % (ref 4–15)
NEUTROPHILS # BLD AUTO: 4.2 K/UL (ref 1.8–7.7)
NEUTROPHILS NFR BLD: 58 % (ref 38–73)
NRBC BLD-RTO: 0 /100 WBC
PLATELET # BLD AUTO: 244 K/UL (ref 150–450)
PMV BLD AUTO: 10.5 FL (ref 9.2–12.9)
POTASSIUM SERPL-SCNC: 3.5 MMOL/L (ref 3.5–5.1)
PROT SERPL-MCNC: 6.7 G/DL (ref 6–8.4)
RBC # BLD AUTO: 4.53 M/UL (ref 4.6–6.2)
SODIUM SERPL-SCNC: 139 MMOL/L (ref 136–145)
WBC # BLD AUTO: 7.3 K/UL (ref 3.9–12.7)

## 2024-05-15 PROCEDURE — 99223 1ST HOSP IP/OBS HIGH 75: CPT | Mod: FS,,, | Performed by: NURSE PRACTITIONER

## 2024-05-15 PROCEDURE — 63600175 PHARM REV CODE 636 W HCPCS: Performed by: STUDENT IN AN ORGANIZED HEALTH CARE EDUCATION/TRAINING PROGRAM

## 2024-05-15 PROCEDURE — 25000003 PHARM REV CODE 250: Performed by: INTERNAL MEDICINE

## 2024-05-15 PROCEDURE — 25000003 PHARM REV CODE 250: Performed by: STUDENT IN AN ORGANIZED HEALTH CARE EDUCATION/TRAINING PROGRAM

## 2024-05-15 PROCEDURE — 80053 COMPREHEN METABOLIC PANEL: CPT | Performed by: INTERNAL MEDICINE

## 2024-05-15 PROCEDURE — 51798 US URINE CAPACITY MEASURE: CPT

## 2024-05-15 PROCEDURE — 86140 C-REACTIVE PROTEIN: CPT | Performed by: INTERNAL MEDICINE

## 2024-05-15 PROCEDURE — 36415 COLL VENOUS BLD VENIPUNCTURE: CPT | Performed by: INTERNAL MEDICINE

## 2024-05-15 PROCEDURE — 85025 COMPLETE CBC W/AUTO DIFF WBC: CPT | Performed by: INTERNAL MEDICINE

## 2024-05-15 RX ORDER — LINEZOLID 600 MG/1
600 TABLET, FILM COATED ORAL EVERY 12 HOURS
Qty: 28 TABLET | Refills: 0 | Status: SHIPPED | OUTPATIENT
Start: 2024-05-15 | End: 2024-05-29

## 2024-05-15 RX ORDER — LINEZOLID 600 MG/1
600 TABLET, FILM COATED ORAL EVERY 12 HOURS
Qty: 20 TABLET | Refills: 0 | Status: SHIPPED | OUTPATIENT
Start: 2024-05-15 | End: 2024-05-15

## 2024-05-15 RX ORDER — LINEZOLID 600 MG/1
600 TABLET, FILM COATED ORAL EVERY 12 HOURS
Status: DISCONTINUED | OUTPATIENT
Start: 2024-05-15 | End: 2024-05-15 | Stop reason: HOSPADM

## 2024-05-15 RX ADMIN — AMPICILLIN SODIUM 2 G: 2 INJECTION, POWDER, FOR SOLUTION INTRAMUSCULAR; INTRAVENOUS at 06:05

## 2024-05-15 RX ADMIN — AMPICILLIN SODIUM 2 G: 2 INJECTION, POWDER, FOR SOLUTION INTRAMUSCULAR; INTRAVENOUS at 02:05

## 2024-05-15 RX ADMIN — OXYCODONE HYDROCHLORIDE AND ACETAMINOPHEN 1 TABLET: 5; 325 TABLET ORAL at 08:05

## 2024-05-15 RX ADMIN — ASPIRIN 81 MG: 81 TABLET, COATED ORAL at 08:05

## 2024-05-15 RX ADMIN — OXYCODONE HYDROCHLORIDE AND ACETAMINOPHEN 1 TABLET: 5; 325 TABLET ORAL at 03:05

## 2024-05-15 RX ADMIN — METOPROLOL SUCCINATE 25 MG: 25 TABLET, EXTENDED RELEASE ORAL at 08:05

## 2024-05-15 RX ADMIN — OXYCODONE HYDROCHLORIDE AND ACETAMINOPHEN 1 TABLET: 5; 325 TABLET ORAL at 02:05

## 2024-05-15 RX ADMIN — AMLODIPINE BESYLATE 5 MG: 5 TABLET ORAL at 08:05

## 2024-05-15 RX ADMIN — LINEZOLID 600 MG: 600 TABLET, FILM COATED ORAL at 08:05

## 2024-05-15 RX ADMIN — ATORVASTATIN CALCIUM 80 MG: 40 TABLET, FILM COATED ORAL at 08:05

## 2024-05-15 RX ADMIN — SENNOSIDES AND DOCUSATE SODIUM 1 TABLET: 8.6; 5 TABLET ORAL at 08:05

## 2024-05-15 NOTE — PLAN OF CARE
[facility] to see if I could schedule patient's 1 week PCP follow up visit and spoke with Jenny. Jenny requested clinicals be faxed to office Attn: Nurse Wadsworth at [phone] and the office will review and contact patient for follow up visit. Clinicals sent via right fax.     [facility] has [facility] Tobais on 5/30 but would like visit to be switch to in person. Called [facility] at [phone] extension [phone] and requested office contact patient to reschedule appointment.    Spoke with Pt's wife Cleo to inform her and she has questions regarding oliver care, previous infections, antibiotic regime. SC sent to Dr. Ramirez per her request asking that he call wife.

## 2024-05-15 NOTE — NURSING
Insertion of the coude catheter performed with sterile technique and field maintained at all times during the procedure.    Pt tolerated procedure well. See flowsheet for for more details.

## 2024-05-15 NOTE — NURSING
Pt requested that I speak w/ the [facility] on his personal cellular device.  I was able to clarify that the infectious disease dr stated that the pt needs a follow up sooner w/ his urologist.

## 2024-05-15 NOTE — CONSULTS
[facility]   Department of Infectious Disease  Consult Note        PATIENT NAME: Arcadio Begum  MRN: [id number]  TODAY'S DATE: 05/15/2024  ADMIT DATE: 5/12/2024  LENGTH OF STAY: 2 DAYS      CHIEF COMPLAINT: Abdominal Pain (Lower abd pain for 2 days, burning pain. Hx of bladder CA and UTI.)    PRINCIPLE PROBLEM: Pyelonephritis    REASON FOR CONSULT: VRE Pyelonephritis    ASSESSMENT and PLAN      1. Sepsis with pyelonephritis with left-sided hydronephrosis and hydroureter with urinary retention in setting of bladder cancer with intravesical BCG treatment  -CT abdomen pelvis with 3.3 cm infrarenal abdominal aortic aneurysm, distended bladder with diverticulum with stranding of wall suspicious for cystitis and persistent bladder wall thickening on the left ear left perinephric stranding with persistent mild left hydroureteronephrosis extending to the bladder without evidence of stone.  There is mild bladder wall thickening within this region and this may represent patient's known bladder cancer findings are suggestive of pyelonephritis  -CRP and procalcitonin pending, afebrile  -urine culture with Enterococcus casselflavus, inherently resistant to vancomycin  -platelets 244    2. Chronic medical problems including diabetes mellitus with hemoglobin A1c 6.5, hypertension, abdominal aortic aneurysm, and CAD        RECOMMENDATIONS:  Continue Linezolid 600mg orally twice daily for 2 weeks  Repeat lab work next week on May 23rd  Allison catheter per Urology  Follow-up with Urology ASAP      INFECTIOUS DISEASE DISCHARGE RECOMMENDATIONS:  When patient is ready to be discharged by attending, Infectious Disease recommends the following    Linezolid 600mg oral twice daily for 2 weeks through May 29th  Maintain Allison catheter  Obtain CBC with diff, CMP and CRP  on May 23rd - come to lab at [facility] to have drawn  [facility]    D/W Dr Reyes    Infectious Disease will sign off. Please send Epic secure  chat with any questions.       Antibiotics (From admission, onward)      Start     Stop Route Frequency Ordered    05/15/24 0900  linezolid tablet 600 mg         -- Oral Every 12 hours 05/15/24 0742          Antifungals (From admission, onward)      None           Antivirals (From admission, onward)      None            HPI      Arcadio Begum is a 78 y.o. male with chronic medical problems including hypertension, diabetes and bladder cancer [facility] [place] who presented to the emergency complaining of fatigue and weakness.  Patient states that he had a cystoscopy on 05/10 and felt okay immediately after and then got progressively weaker until he could not stand up on 05/12.  He was had a history of multiple urinary tract infections and pyelonephritis after cystoscopy and or urinary catheter and most recently was on cefdinir in April.  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  He complains of initial constipation which has progressed to diarrhea after taking several doses of MiraLax, he complains of dysuria, urgency and frequency.  He denies fevers or chills, nausea or vomiting.  Patient states that he gets a urinary tract infection every time they put in a Allison catheter which is usually after a cystoscopy.  He refused to have a urinary catheter stay in after this last cystoscopy.  He was most recently seen by infectious disease in March of this year status post BCG.  He was sent home on Omnicef for 5 days after treatment in the hospital with ceftriaxone.  Urine culture was negative, previous cultures have had group B strep and Pseudomonas aeruginosa.     T-max 98.2° since admission on 05/12.  5/12 blood cultures no growth to date, urine culture with Enterococcus casseliflavus resistant to ampicillin and vancomycin.  WBC 9.68, 81.6% neutrophils, today 7.30, left shift  resolved, no bands, eosinophils 3.2%, BUN/CR 18/0.9, estimated creatinine clearance 81.5, ALT/AST 11/10.  CT with distended bladder, cystitis, pyelonephritis, left hydroureteronephrosis no evidence of stones, and 3.3 cm infrarenal abdominal aortic aneurysm.  He was given ceftriaxone, then ampicillin and started on linezolid today.  He was seen by Urology who recommended urinary catheter placement which he initially refused.      Social history/Outdoor activities:  Lives at home with wife, former long-time smoker, no alcohol use, no drug use, retired from sales, no pets but used to have horses and his neighbor has goats and miniature horses; usually independent, drives herself to and from appointment  Travel:  None recent  Implants:  None?  Antibiotic history:  See HPI    Social History  Marital Status:   Alcohol History:  reports that he does not currently use alcohol.  Tobacco History:  has no history on file for tobacco use.  Drug History:  has no history on file for drug use.    Review of patient's allergies indicates:  No Known Allergies    MEDICAL HISTORY   Basal cell carcinoma of the skin   Dyslipidemia   Bladder cancer   Hypertension   Diabetes mellitus  Diverticulosis   Phimosis      SURGICAL HISTORY   TURBT - cystoscopy and treatment  9/1523    SUBJECTIVE     Review of Systems  Constitutional: Denies fevers, chills, and night sweats; + weakness and faitigue  HEENT: Denies visual changes, ear pain, mouth pain or trouble swallowing, sore throat. + chronic sinus congestion  Neck: Denies neck pain or lumps.  Respiratory: Denies  coughing, wheezing or hemoptysis.+ chronic intermittent SOB  Cardiovascular:  Denies chest pain, palpitations or edema.  Gastrointestinal: Denies abdominal pain nausea, or vomiting,  +constipation progressed to diarrhea.   Genitourinary:  + dysuria, frequency, urgency, No hematuria   Musculoskeletal:  Denies joint pain or swelling, +difficulty walking - unable to stand up.     Skin:  Denies rash or itching.  Neurologic:  Denies motor or sensory loss, headaches or dizziness.  + weakness  Psychiatric:  Denies changes in mood or behavior.    OBJECTIVE     Temp:  [97.4 °F (36.3 °C)-98.1 °F (36.7 °C)] 97.8 °F (36.6 °C)  Pulse:  [53-63] 53  Resp:  [16-18] 18  SpO2:  [93 %-97 %] 93 %  BP: (105-146)/(65-79) 105/66  Temp:  [97.4 °F (36.3 °C)-98.1 °F (36.7 °C)]   Temp: 97.8 °F (36.6 °C) (05/15/24 1136)  Pulse: (!) 53 (05/15/24 1136)  Resp: 18 (05/15/24 1136)  BP: 105/66 (05/15/24 1136)  SpO2: (!) 93 % (05/15/24 1136)    Intake/Output Summary (Last 24 hours) at 5/15/2024 1509  Last data filed at 5/15/2024 1227  Gross per 24 hour   Intake 818 ml   Output 1075 ml   Net -257 ml      Examined at 1124  Physical Exam  General:  Sitting up on side of bed awake and alert, he has in no distress, pleasant and conversant good historian.  Eyes: Eyes with no icterus or injection. Vision grossly normal  Ears: Hearing grossly normal.  Nose: Nares patent  Mouth:  Dry mucous membranes, upper dentures, lower partial. No ulcerations, erythema or exudates.  Neck: Supple, no tenderness to palpation.  Cardiovascular: Regular rate and rhythm, no murmurs, no edema.    Respiratory: Clear to auscultation bilaterally, no tachypnea or increased work of breathing.  On room air.  Gastrointestinal: Soft with active bowel sounds, no tenderness to palpation, no distention.  Genitourinary: Mild suprapubic tenderness.   Musculoskeletal: Moves all extremities with equal strength.  Able to move about his room independently.  Skin: Warm and dry, no obvious rashes.    Neuro: Oriented, conversant, follows commands.  Psych: Good mood, normal affect.  VAD: PIV  Isolation: None    Wounds  None    Significant Labs: All pertinent labs within the past 24 hours have been reviewed.    CBC LAST 7 DAYS  Recent Labs   Lab 05/12/24  9024 05/13/24  0557 05/14/24  0802 05/15/24  0554   WBC 9.68 9.44 9.00 7.30   RBC 4.99 4.60 4.90 4.53*   HGB 13.9*  "12.9* 13.7* 12.7*   HCT 42.7 39.1* 41.4 38.3*   MCV 86 85 85 85   MCH 27.9 28.0 28.0 28.0   MCHC 32.6 33.0 33.1 33.2   RDW 13.9 13.9 13.7 13.5    208 241 244   MPV 10.4 10.5 10.3 10.5   GRAN 81.6*  7.9* 69.7  6.6 66.4  6.0 58.0  4.2   LYMPH 7.5*  0.7* 14.6*  1.4 16.3*  1.5 22.9  1.7   MONO 9.9  1.0 14.5  1.4* 14.6  1.3* 14.7  1.1*   BASO 0.05 0.05 0.06 0.06   NRBC 0 0 0 0       CHEMISTRY LAST 7 DAYS  Recent Labs   Lab 05/12/24  1854 05/13/24  0557 05/14/24  0802 05/15/24  0554    136 136 139   K 3.9 3.9 3.6 3.5    106 104 105   CO2 22* 23 22* 25   ANIONGAP 9 7* 10 9   BUN 18 17 16 16   CREATININE 0.9 0.9 0.8 0.8   * 107 121* 123*   CALCIUM 9.2 8.8 9.2 8.9   MG 2.0  --   --   --    ALBUMIN 4.2 3.7 4.0 3.6   PROT 7.4 6.6 7.2 6.7   ALKPHOS 83 66 67 66   ALT 8* 6* 7* 11   AST 9* 8* 8* 10   BILITOT 0.7 0.8 0.6 0.5       Estimated Creatinine Clearance: 81.5 mL/min (based on SCr of 0.8 mg/dL).    INFLAMMATORY/PROCAL  LAST 7 DAYS  No results for input(s): "PROCAL", "ESR", "CRP" in the last 168 hours.  No results found for: "ESR"  CRP   Date Value Ref Range Status   03/08/2024 158.0 (H) 0.0 - 8.2 mg/L Final   09/05/2023 6.6 0.0 - 8.2 mg/L Final       PRIOR MICROBIOLOGY:  Susceptibility data from last 90 days.  Collected Specimen Info Organism Ampicillin Daptomycin Linezolid Nitrofurantoin Tetracycline Vancomycin   05/12/24 Urine Enterococcus casseliflavus  R  S  S  R  R  R   03/07/24 Blood from Peripheral, Antecubital, Right No growth after 5 days.         03/07/24 Blood from Peripheral, Antecubital, Left No growth after 5 days.         02/22/24 Urine Streptococcus agalactiae (Group B)         02/22/24 Blood No growth after 5 days.         02/22/24 Blood from Peripheral, Antecubital, Right No growth after 5 days.             LAST 7 DAYS MICROBIOLOGY   Microbiology Results (last 7 days)       Procedure Component Value Units Date/Time    Urine culture [[id number]]  (Abnormal)  " (Susceptibility) Collected: 05/12/24 2122    Order Status: Completed Specimen: Urine Updated: 05/15/24 0632     Urine Culture, Routine ENTEROCOCCUS CASSELIFLAVUS  >100,000 cfu/ml      Narrative:      Specimen Source->Urine    Blood culture x two cultures. Draw prior to antibiotics. [[id number]] Collected: 05/12/24 1900    Order Status: Completed Specimen: Blood from Peripheral, Wrist, Left Updated: 05/14/24 2032     Blood Culture, Routine No Growth to date      No Growth to date      No Growth to date    Narrative:      Aerobic and anaerobic    Blood culture x two cultures. Draw prior to antibiotics. [[id number]] Collected: 05/12/24 1854    Order Status: Completed Specimen: Blood from Peripheral, Antecubital, Right Updated: 05/14/24 2032     Blood Culture, Routine No Growth to date      No Growth to date      No Growth to date    Narrative:      Aerobic and anaerobic              CURRENT/PREVIOUS VISIT EKG  Results for orders placed or performed during the hospital encounter of 05/12/24   EKG 12-lead    Collection Time: 05/13/24  6:45 AM   Result Value Ref Range    QRS Duration 74 ms    OHS QTC Calculation 403 ms    Narrative    Test Reason : A41.9,    Vent. Rate : 065 BPM     Atrial Rate : 065 BPM     P-R Int : 164 ms          QRS Dur : 074 ms      QT Int : 388 ms       P-R-T Axes : 063 -17 033 degrees     QTc Int : 403 ms    Normal sinus rhythm  Normal ECG  When compared with ECG of 07-MAR-2024 21:07,  No significant change was found    Referred By: [email or web address]   SELF           Confirmed By:          Significant Imaging: I have reviewed all relevant and available imaging results/findings within the past 24 hours.    X-Ray Chest AP Portable 05/12/24 2031   No acute abnormality involving the chest as imaged.    CT Abdomen Pelvis Without Contrast 05/13/24 0929   Left perinephric stranding with persistent mild left hydroureteronephrosis extending to the bladder without evidence of stone.  There is mild bladder wall  thickening within this region and this may represent patient's known bladder cancer.  Findings are suggestive of pyelonephritis   Distended bladder with diverticulum and stranding of the wall suspicious for cystitis.  There is persistent bladder wall thickening on the left   3.3 cm infrarenal abdominal aortic aneurysm   Stable simple right renal cyst        Gilda Rodarte NP  Date of Service: 05/15/2024      This note was created using M Modal  voice recognition software that occasionally misinterpreted phrases or words.

## 2024-05-15 NOTE — PLAN OF CARE
Patient cleared for discharge by case management to home no needs.        05/15/24 145   Final Note   Assessment Type Final Discharge Note   Anticipated Discharge Disposition Home   Hospital Resources/Appts/Education Provided Appointments scheduled and added to AVS

## 2024-05-16 NOTE — HOSPITAL COURSE
Patient was admitted for Pyelonephritis for enterococcus. Patient was initially started on IV Ampicillin then transitioned to PO Linezolid (for 14 days) once the bacteria was isolated as VRE resistant to Ampicillin. Patient had no other complaints. To note Patient has Hx of Bladder Cancer and prior Urinary Strictures and [facility]. Patient was asked to F/U with PCP and his Urologist outpatient

## 2024-05-16 NOTE — DISCHARGE SUMMARY
[facility] Medicine  Discharge Summary      Patient Name: Arcadio Begum  MRN: [id number]  [facility]: [id number]  Patient Class: IP- Inpatient  Admission Date: 5/12/2024  Hospital Length of Stay: 2 days  Discharge Date and Time: 5/15/2024  5:15 PM  Attending Physician: No att. providers found   Discharging Provider: Hari Ramirez MD  Primary Care Provider: [facility]    Primary Care Team: Networked reference to record PCT     HPI:   Arcadio Fitzgerald is a 78 y.o. male with PMH significant for Bladder cancer diagnosed In September 2023, and recurrent cystoscopy procedures presented with dysuria and fatigue of two days duration. Patient who was in his usual state of health until 2 days ago when he went for his cystoscopy procedure and reports feeling fatigued and having dysuria after the procedure. He has had three cystoscopy procedures since his bladder cancer diagnosis last September. Patient is being evaluated by the Hospital Medicine service  after developing significant fatigue and being unable to walk. Patient has had history of UTI and pyelonephritis after his bladder interventions and his most recent pyelonephritis was last month (April) where he was discharged home with Cefdinir. 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 Patient will be admitted to [facility] and consult to Urology.  Patient denies any headache or visual changes.  Denies any focal weakness, numbness tingling or paresthesias. Admits to generalized weakness. Denies any abdominal pain.  Denies melena hematochezia or any GI bleeding.  Denies diarrhea or constipation. Denies any cough or cold symptoms.  Denies dysuria, frequency or urgency.  Denies any other problems or complaints.    Pt denies history of afib, denies personal hx of blood clots. He lives  with his wife and performs all ADLs independently. Pt denies alcohol and denies recreational drug use, denies smoking.        * No surgery found *      Hospital Course:   Patient was admitted for Pyelonephritis for enterococcus. Patient was initially started on IV Ampicillin then transitioned to PO Linezolid (for 14 days) once the bacteria was isolated as VRE resistant to Ampicillin. Patient had no other complaints. To note Patient has Hx of Bladder Cancer and prior Urinary Strictures and [facility]. Patient was asked to F/U with PCP and his Urologist outpatient      Goals of Care Treatment Preferences:  Code Status: Full Code      Consults:   Consults (From admission, onward)          Status Ordering Provider     Inpatient consult to Infectious Diseases  Once        Provider:  Agnieszka Rodriguez MD    Completed YOUSIF DEAN     Inpatient consult to Urology  Once        Provider:  Shabnam Juarez MD    Completed JACKIE MELVIN            No new Assessment & Plan notes have been filed under this hospital service since the last note was generated.  Service: Hospital Medicine    Final Active Diagnoses:    Diagnosis Date Noted POA    PRINCIPAL PROBLEM:  Pyelonephritis [N12] 03/07/2024 Yes    Primary hypertension [I10] 03/08/2024 Yes    Type 2 diabetes mellitus without complication, without long-term current use of insulin [E11.9] 03/07/2024 Yes    Bladder cancer [C67.9] 03/07/2024 Yes    Urinary retention [R33.9] 08/28/2023 Yes    S/P TURP [Z90.79] 08/27/2023 Not Applicable      Problems Resolved During this Admission:       Discharged Condition: good    Disposition: Home or Self Care    Follow Up:   Follow-up Information       Clinic, [facility] Follow up in 1 week(s).    Specialty: Internal Medicine  Why: [facility] araceli office will contact patient to schedule visit.  Contact information:  [address]  [facility] [address]  [facility] [address] [phone]               QI  [facility] Follow up on 5/23/2024.    Why: Go to Lab for CBC with Diff, CMP and CRP.  Contact information:  [address]  [facility] [id number]             Lennox Tobias MD Follow up.    Specialty: Urology  Why: [facility], office will contact patient to move appointment to a sooner date.  Contact information:  [address] [facility]  [address] [facility] [id number] [phone]                           Patient Instructions:      CBC Auto Differential   Standing Status: Future Standing Exp. Date: 08/13/25     Comprehensive Metabolic Panel   Standing Status: Future Standing Exp. Date: 08/13/25     C-Reactive Protein   Standing Status: Future Standing Exp. Date: 08/13/25       Significant Diagnostic Studies: Labs: CMP   Recent Labs   Lab 05/15/24  0554      K 3.5      CO2 25   *   BUN 16   CREATININE 0.8   CALCIUM 8.9   PROT 6.7   ALBUMIN 3.6   BILITOT 0.5   ALKPHOS 66   AST 10   ALT 11   ANIONGAP 9    and CBC   Recent Labs   Lab 05/15/24  0554   WBC 7.30   HGB 12.7*   HCT 38.3*          Pending Diagnostic Studies:       None           Medications:  Reconciled Home Medications:      Medication List        START taking these medications      linezolid 600 mg Tab  Commonly known as: ZYVOX  Take 1 tablet (600 mg total) by mouth every 12 (twelve) hours. for 14 days            CONTINUE taking these medications      acetaminophen 500 MG tablet  Commonly known as: TYLENOL  Take 500 mg by mouth every 6 (six) hours as needed for Pain.     amLODIPine 10 MG tablet  Commonly known as: NORVASC  Take 5 mg by mouth once daily.     aspirin 81 MG EC tablet  Commonly known as: ECOTRIN  Take 81 mg by mouth once daily.     atorvastatin 80 MG tablet  Commonly known as: LIPITOR  Take 80 mg by mouth once daily.     empagliflozin 25 mg tablet  Commonly known as: Jardiance  Take 12.5 mg by mouth once daily.     ibuprofen 200 MG tablet  Commonly known  as: ADVIL,MOTRIN  Take 200 mg by mouth every 6 (six) hours as needed for Pain.     metoprolol succinate 25 MG 24 hr tablet  Commonly known as: TOPROL-XL  Take 25 mg by mouth once daily.     oxyCODONE-acetaminophen 5-325 mg per tablet  Commonly known as: PERCOCET  Take 1 tablet by mouth every 6 (six) hours as needed for Pain.     VITAMIN D3 50 mcg (2,000 unit) Cap capsule  Generic drug: cholecalciferol (vitamin D3)  Take 1 capsule by mouth every other day.              Indwelling Lines/Drains at time of discharge:   Lines/Drains/Airways       Drain  Duration                  Urethral Catheter 05/15/24 1656 Coude <1 day                    Time spent on the discharge of patient: 35 minutes         Hari Ramirez MD  Department of Hospital Medicine  [facility]

## 2024-05-17 LAB
BACTERIA BLD CULT: NORMAL
BACTERIA BLD CULT: NORMAL

## 2024-05-25 LAB
OHS QRS DURATION: 74 MS
OHS QTC CALCULATION: 403 MS

## 2024-08-19 PROBLEM — N12 PYELONEPHRITIS: Status: RESOLVED | Noted: 2024-03-07 | Resolved: 2024-08-19

## 2024-09-14 ENCOUNTER — HOSPITAL ENCOUNTER (EMERGENCY)
Facility: HOSPITAL | Age: 79
Discharge: HOME | End: 2024-09-14
Attending: EMERGENCY MEDICINE
Payer: OTHER GOVERNMENT

## 2024-09-14 VITALS
BODY MASS INDEX: 33.11 KG/M2 | HEIGHT: 66 IN | TEMPERATURE: 100 F | DIASTOLIC BLOOD PRESSURE: 65 MMHG | SYSTOLIC BLOOD PRESSURE: 127 MMHG | OXYGEN SATURATION: 96 % | WEIGHT: 206 LBS | HEART RATE: 65 BPM | RESPIRATION RATE: 24 BRPM

## 2024-09-14 DIAGNOSIS — R50.9 FEBRILE ILLNESS: Primary | ICD-10-CM

## 2024-09-14 DIAGNOSIS — R53.1 GENERALIZED WEAKNESS: ICD-10-CM

## 2024-09-14 LAB
ANION GAP SERPL CALC-SCNC: 10 MMOL/L (ref 8–16)
BACTERIA #/AREA URNS HPF: ABNORMAL /HPF
BASOPHILS # BLD AUTO: 0.08 K/UL (ref 0–0.2)
BASOPHILS NFR BLD: 0.4 % (ref 0–1.9)
BILIRUB UR QL STRIP: NEGATIVE
BUN SERPL-MCNC: 17 MG/DL (ref 8–23)
CALCIUM SERPL-MCNC: 9 MG/DL (ref 8.7–10.5)
CHLORIDE SERPL-SCNC: 105 MMOL/L (ref 95–110)
CLARITY UR: CLEAR
CO2 SERPL-SCNC: 22 MMOL/L (ref 23–29)
COLOR UR: YELLOW
CREAT SERPL-MCNC: 0.9 MG/DL (ref 0.5–1.4)
DIFFERENTIAL METHOD BLD: ABNORMAL
EOSINOPHIL # BLD AUTO: 0 K/UL (ref 0–0.5)
EOSINOPHIL NFR BLD: 0 % (ref 0–8)
ERYTHROCYTE [DISTWIDTH] IN BLOOD BY AUTOMATED COUNT: 13.5 % (ref 11.5–14.5)
EST. GFR  (NO RACE VARIABLE): >60 ML/MIN/1.73 M^2
GLUCOSE SERPL-MCNC: 131 MG/DL (ref 70–110)
GLUCOSE UR QL STRIP: ABNORMAL
HCT VFR BLD AUTO: 46 % (ref 40–54)
HGB BLD-MCNC: 15.6 G/DL (ref 14–18)
HGB UR QL STRIP: ABNORMAL
IMM GRANULOCYTES # BLD AUTO: 0.1 K/UL (ref 0–0.04)
IMM GRANULOCYTES NFR BLD AUTO: 0.4 % (ref 0–0.5)
INFLUENZA A, MOLECULAR: NEGATIVE
INFLUENZA B, MOLECULAR: NEGATIVE
KETONES UR QL STRIP: NEGATIVE
LACTATE SERPL-SCNC: 1.4 MMOL/L (ref 0.5–1.9)
LEUKOCYTE ESTERASE UR QL STRIP: NEGATIVE
LYMPHOCYTES # BLD AUTO: 0.7 K/UL (ref 1–4.8)
LYMPHOCYTES NFR BLD: 3.2 % (ref 18–48)
MCH RBC QN AUTO: 29.5 PG (ref 27–31)
MCHC RBC AUTO-ENTMCNC: 33.9 G/DL (ref 32–36)
MCV RBC AUTO: 87 FL (ref 82–98)
MICROSCOPIC COMMENT: ABNORMAL
MONOCYTES # BLD AUTO: 1.9 K/UL (ref 0.3–1)
MONOCYTES NFR BLD: 8.3 % (ref 4–15)
NEUTROPHILS # BLD AUTO: 19.5 K/UL (ref 1.8–7.7)
NEUTROPHILS NFR BLD: 87.7 % (ref 38–73)
NITRITE UR QL STRIP: NEGATIVE
NRBC BLD-RTO: 0 /100 WBC
PH UR STRIP: 6 [PH] (ref 5–8)
PLATELET # BLD AUTO: 227 K/UL (ref 150–450)
PMV BLD AUTO: 11 FL (ref 9.2–12.9)
POTASSIUM SERPL-SCNC: 4 MMOL/L (ref 3.5–5.1)
PROT UR QL STRIP: ABNORMAL
RBC # BLD AUTO: 5.28 M/UL (ref 4.6–6.2)
RBC #/AREA URNS HPF: 5 /HPF (ref 0–4)
SARS-COV-2 RDRP RESP QL NAA+PROBE: NEGATIVE
SODIUM SERPL-SCNC: 137 MMOL/L (ref 136–145)
SP GR UR STRIP: >1.03 (ref 1–1.03)
SPECIMEN SOURCE: NORMAL
SQUAMOUS #/AREA URNS HPF: 1 /HPF
URN SPEC COLLECT METH UR: ABNORMAL
UROBILINOGEN UR STRIP-ACNC: NEGATIVE EU/DL
WBC # BLD AUTO: 22.24 K/UL (ref 3.9–12.7)
WBC #/AREA URNS HPF: 8 /HPF (ref 0–5)
YEAST URNS QL MICRO: ABNORMAL

## 2024-09-14 PROCEDURE — 93005 ELECTROCARDIOGRAM TRACING: CPT | Performed by: INTERNAL MEDICINE

## 2024-09-14 PROCEDURE — 85025 COMPLETE CBC W/AUTO DIFF WBC: CPT | Performed by: EMERGENCY MEDICINE

## 2024-09-14 PROCEDURE — 87502 INFLUENZA DNA AMP PROBE: CPT | Performed by: EMERGENCY MEDICINE

## 2024-09-14 PROCEDURE — 80048 BASIC METABOLIC PNL TOTAL CA: CPT | Performed by: EMERGENCY MEDICINE

## 2024-09-14 PROCEDURE — U0002 COVID-19 LAB TEST NON-CDC: HCPCS | Performed by: EMERGENCY MEDICINE

## 2024-09-14 PROCEDURE — 81003 URINALYSIS AUTO W/O SCOPE: CPT | Performed by: EMERGENCY MEDICINE

## 2024-09-14 PROCEDURE — 99285 EMERGENCY DEPT VISIT HI MDM: CPT | Mod: 25

## 2024-09-14 PROCEDURE — 25000003 PHARM REV CODE 250: Performed by: EMERGENCY MEDICINE

## 2024-09-14 PROCEDURE — 93010 ELECTROCARDIOGRAM REPORT: CPT | Mod: ,,, | Performed by: INTERNAL MEDICINE

## 2024-09-14 PROCEDURE — 96360 HYDRATION IV INFUSION INIT: CPT

## 2024-09-14 PROCEDURE — 81001 URINALYSIS AUTO W/SCOPE: CPT | Performed by: EMERGENCY MEDICINE

## 2024-09-14 PROCEDURE — 83605 ASSAY OF LACTIC ACID: CPT | Performed by: EMERGENCY MEDICINE

## 2024-09-14 RX ORDER — ACETAMINOPHEN 500 MG
1000 TABLET ORAL
Status: COMPLETED | OUTPATIENT
Start: 2024-09-14 | End: 2024-09-14

## 2024-09-14 RX ADMIN — ACETAMINOPHEN 1000 MG: 500 TABLET, FILM COATED ORAL at 02:09

## 2024-09-14 RX ADMIN — SODIUM CHLORIDE 1000 ML: 9 INJECTION, SOLUTION INTRAVENOUS at 02:09

## 2024-09-14 NOTE — ED NOTES
Patient up to ambulate in hallway. Patient leaning over when he walks. Patient educated not to lean over when walking. Patient verbalized understanding. Patient able to walk in hallway with minimal assistance. Patient then assisted back into bed. Patient reports minimal dizziness when ambulating in the schroeder. Dr. Hernandez notified.

## 2024-09-14 NOTE — ED PROVIDER NOTES
Chief complaint:  Weakness      HPI:  Arcadio Begum is a 79 y.o. male with hx htn, bladder CA, memory issues, tobacco use, DM presenting with less than one day of generalized weakness accompanied by fever to 100.4.  Wife present who provides much of the history endorses similar presentation with infections in the past.  Additional features include nasal congestion and nonproductive cough for less than a day.  Patient denies dyspnea or chest pain.  No confusion, headache, neck stiffness.  No urinary complaints such as frequency, urgency, dysuria, hematuria.  There is no focal numbness or weakness.  No speech change, visual change, difficulty swallowing.  Generalized weakness was to the point that he had difficulty standing and walking requiring use of assistive device at home.    ROS: As per HPI and below:  No swelling, rash, blood in the stools, vomiting, diarrhea, new medications.    Review of patient's allergies indicates:  No Known Allergies    Patient's Medications   New Prescriptions    No medications on file   Previous Medications    ACETAMINOPHEN (TYLENOL) 500 MG TABLET    Take 500 mg by mouth every 6 (six) hours as needed for Pain.    AMLODIPINE (NORVASC) 10 MG TABLET    Take 5 mg by mouth once daily.    ASPIRIN (ECOTRIN) 81 MG EC TABLET    Take 81 mg by mouth once daily.    ATORVASTATIN (LIPITOR) 80 MG TABLET    Take 80 mg by mouth once daily.    CHOLECALCIFEROL, VITAMIN D3, (VITAMIN D3) 50 MCG (2,000 UNIT) CAP CAPSULE    Take 1 capsule by mouth every other day.    EMPAGLIFLOZIN (JARDIANCE) 25 MG TABLET    Take 12.5 mg by mouth once daily.    IBUPROFEN (ADVIL,MOTRIN) 200 MG TABLET    Take 200 mg by mouth every 6 (six) hours as needed for Pain.    METOPROLOL SUCCINATE (TOPROL-XL) 25 MG 24 HR TABLET    Take 25 mg by mouth once daily.    OXYCODONE-ACETAMINOPHEN (PERCOCET) 5-325 MG PER TABLET    Take 1 tablet by mouth every 6 (six) hours as needed for Pain.   Modified Medications    No medications on file    Discontinued Medications    No medications on file       PMH:  As per HPI and below:  Past Medical History:   Diagnosis Date    Left leg pain 03/07/2024     No past surgical history on file.    Social History     Socioeconomic History    Marital status:    Substance and Sexual Activity    Alcohol use: Not Currently    Sexual activity: Not Currently       No family history on file.    Physical Exam:    Vitals:    09/14/24 1502   BP: 127/65   Pulse: 65   Resp:    Temp:      GENERAL:  No apparent distress.  Alert.    HEENT:  Moist mucous membranes.  Normocephalic and atraumatic.    NECK:  No swelling.  Midline trachea.  Supple.  CARDIOVASCULAR:  Regular rate and rhythm.  2+ radial pulses.  No murmur.  PULMONARY:  Lungs clear to auscultation bilaterally.  No wheezes, rales, or rhonci.    ABDOMEN:  Non-tender and non-distended.    EXTREMITIES:  Warm and well perfused.  Brisk capillary refill.    NEUROLOGICAL:  Normal mental status.  Appropriate and conversant.  5/5 strength and sensation.  CN III through XII intact.    SKIN:  No rashes or ecchymoses.    BACK:  Atraumatic.  No CVA tenderness to palpation.      Labs Reviewed   CBC W/ AUTO DIFFERENTIAL - Abnormal       Result Value    WBC 22.24 (*)     RBC 5.28      Hemoglobin 15.6      Hematocrit 46.0      MCV 87      MCH 29.5      MCHC 33.9      RDW 13.5      Platelets 227      MPV 11.0      Immature Granulocytes 0.4      Gran # (ANC) 19.5 (*)     Immature Grans (Abs) 0.10 (*)     Lymph # 0.7 (*)     Mono # 1.9 (*)     Eos # 0.0      Baso # 0.08      nRBC 0      Gran % 87.7 (*)     Lymph % 3.2 (*)     Mono % 8.3      Eosinophil % 0.0      Basophil % 0.4      Differential Method Automated     BASIC METABOLIC PANEL - Abnormal    Sodium 137      Potassium 4.0      Chloride 105      CO2 22 (*)     Glucose 131 (*)     BUN 17      Creatinine 0.9      Calcium 9.0      Anion Gap 10      eGFR >60.0     URINALYSIS, REFLEX TO URINE CULTURE - Abnormal    Specimen UA Urine,  Clean Catch      Color, UA Yellow      Appearance, UA Clear      pH, UA 6.0      Specific Gravity, UA >1.030 (*)     Protein, UA Trace (*)     Glucose, UA 4+ (*)     Ketones, UA Negative      Bilirubin (UA) Negative      Occult Blood UA 1+ (*)     Nitrite, UA Negative      Urobilinogen, UA Negative      Leukocytes, UA Negative      Narrative:     Specimen Source->Urine   URINALYSIS MICROSCOPIC - Abnormal    RBC, UA 5 (*)     WBC, UA 8 (*)     Bacteria None      Yeast, UA None      Squam Epithel, UA 1      Microscopic Comment SEE COMMENT      Narrative:     Specimen Source->Urine   LACTIC ACID, PLASMA    Lactate (Lactic Acid) 1.4     SARS-COV-2 RNA AMPLIFICATION, QUAL    SARS-CoV-2 RNA, Amplification, Qual Negative     INFLUENZA A AND B ANTIGEN    Influenza A, Molecular Negative      Influenza B, Molecular Negative      Flu A & B Source Nasal swab      Narrative:     Specimen Source->Nasopharyngeal Swab       Current Discharge Medication List        CONTINUE these medications which have NOT CHANGED    Details   acetaminophen (TYLENOL) 500 MG tablet Take 500 mg by mouth every 6 (six) hours as needed for Pain.      amLODIPine (NORVASC) 10 MG tablet Take 5 mg by mouth once daily.      aspirin (ECOTRIN) 81 MG EC tablet Take 81 mg by mouth once daily.      atorvastatin (LIPITOR) 80 MG tablet Take 80 mg by mouth once daily.      cholecalciferol, vitamin D3, (VITAMIN D3) 50 mcg (2,000 unit) Cap capsule Take 1 capsule by mouth every other day.      empagliflozin (JARDIANCE) 25 mg tablet Take 12.5 mg by mouth once daily.      ibuprofen (ADVIL,MOTRIN) 200 MG tablet Take 200 mg by mouth every 6 (six) hours as needed for Pain.      metoprolol succinate (TOPROL-XL) 25 MG 24 hr tablet Take 25 mg by mouth once daily.      oxyCODONE-acetaminophen (PERCOCET) 5-325 mg per tablet Take 1 tablet by mouth every 6 (six) hours as needed for Pain.             Orders Placed This Encounter   Procedures    X-Ray Chest 1 View    CBC auto  differential    Basic metabolic panel (BMP)    Lactic acid, plasma    Urinalysis, Reflex to Urine Culture Urine, Clean Catch    COVID-19 Rapid Screening    Influenza antigen Nasopharyngeal Swab    Urinalysis Microscopic    Cardiac Monitoring - Adult    Pulse Oximetry Continuous    EKG 12-lead    Insert Saline lock IV       Imaging Results              X-Ray Chest 1 View (In process)                     ED Course as of 09/14/24 1732   Sat Sep 14, 2024   1431 EKG:  NSR, rate of 70, normal intervals, L axis.  There are no acute ST or T wave changes suggestive of acute ischemia or infarction.  (Independently interpreted by me) [MR]   1640 Patient able to walk with minimal assistance per RN. [MR]   1644 CXR:  NAD. (Independently interpreted by me) [MR]      ED Course User Index  [MR] Adam Hernandez MD       MDM:    79 y.o. male with acute onset of fever and generalized weakness.  Upper respiratory symptoms would be suggestive of viral URI.  Chest x-ray done to exclude pneumonia.  Additional lab sent to exclude end-organ dysfunction including lactic acid for risk segregation of sepsis.  There is no focal deficit with very low suspicion for acute intracranial process such as CVA, meningitis, encephalitis.  I do not think brain imaging or lumbar puncture is indicated.  IV fluids and acetaminophen given for symptomatic relief pending further workup.  Patient does feel improved with antipyretic here with symptomatic relief with antipyretic and decongestant as necessary at home discussed.  I did offer admission for observation, but patient declines at this point wishing to go home with close follow-up, which I feel is reasonable.  Nonspecific leukocytosis possibly relating to fever discussed as well.  Chest x-ray is not show bacterial pneumonia and viral process is favored.  Urinalysis is not consistent with UTI.  Close outpatient follow-up for recheck with detailed return precautions reviewed with patient and family  member present.    Diagnoses:    1. Fever  2. Generalized weakness       Adam Hernandez MD  09/14/24 5483

## 2024-09-18 LAB
OHS QRS DURATION: 80 MS
OHS QTC CALCULATION: 406 MS

## 2025-02-04 NOTE — H&P
[facility] - Emergency Dept  Hospital Medicine  History & Physical    Patient Name: Arcadio Begum  MRN: [id number]  Patient Class: IP- Inpatient  Admission Date: 3/7/2024  Attending Physician: Windy Adam MD  Primary Care Provider: [facility]         Patient information was obtained from patient and ER records.     Subjective:     Principal Problem:Sepsis without acute organ dysfunction    Chief Complaint:   Chief Complaint   Patient presents with    Fever     101.2 at home. Tylenol at 1915. On chemo for bladder cancer. Had chemo today(BCG treatment)    Generalized Body Aches        HPI: 77 yo male with PMH of bladder cancer on BCG treatment, last of which was today, who presented with generalized weakness and fever. Per pt, he got home today after the BCG treatment and started to have generalized weakness and fatigue. Soon after, he developed a fever of 101.2. Wife gave pt 1000 mg of tylenol and [facility] and they recommended to go to the ER for eval. Pt has dysuria which is normal to him after getting BCG treatment per pt. He also usually has frequency and urgency. ROS is positive for left flank pain for 2 week.     On arrival to the ER, pt was afebrile, with bp of 113/75, and HR of 75. Labs showed leukocytosis of 14.58. CMP, lactic acid and procal are wnl. Covid/flu negative. CT Chest/abdomen/pelvis showed Mild left perinephric stranding, heterogenous parenchymal enhancement of the kidney, mild hydronephrosis and hydroureter with periureteral fat stranding extending to the bladder and without obstructing ureteral stone. Findings are suspicious for pyelonephritis and pyelitis. Also findings suspicious for cystitis. Urine and blood cx collected. Pt was given zosyn and vanco and will be admitted to medicine for further care.    PMH: Bladder cancer, DM2, HTN, CAD    PSH; Hernia repair, bladder cancer bx.    Fhx; heart disease    Social history; drink occasionally,  doesn't smoke or use any drugs.     Review of patient's allergies indicates:  No Known Allergies    No current facility-administered medications on file prior to encounter.     Current Outpatient Medications on File Prior to Encounter   Medication Sig    acetaminophen (TYLENOL) 500 MG tablet Take 500 mg by mouth every 6 (six) hours as needed for Pain.    amLODIPine (NORVASC) 10 MG tablet Take 5 mg by mouth once daily.    aspirin (ECOTRIN) 81 MG EC tablet Take 81 mg by mouth once daily.    atorvastatin (LIPITOR) 80 MG tablet Take 80 mg by mouth once daily.    empagliflozin (JARDIANCE) 25 mg tablet Take 12.5 mg by mouth once daily.    ibuprofen (ADVIL,MOTRIN) 200 MG tablet Take 200 mg by mouth every 6 (six) hours as needed for Pain.    metoprolol succinate (TOPROL-XL) 25 MG 24 hr tablet Take 25 mg by mouth once daily.    oxyCODONE-acetaminophen (PERCOCET) 5-325 mg per tablet Take 1 tablet by mouth every 6 (six) hours as needed for Pain.           Review of Systems   Constitutional:  Positive for activity change, chills and fatigue.   HENT:  Negative for sore throat.    Eyes:  Negative for visual disturbance.   Respiratory:  Negative for cough and shortness of breath.    Cardiovascular:  Negative for chest pain, palpitations and leg swelling.   Gastrointestinal:  Negative for abdominal distention, abdominal pain, nausea and vomiting.   Endocrine: Negative for cold intolerance and heat intolerance.   Genitourinary:  Positive for dysuria, flank pain, frequency and urgency.   Musculoskeletal:  Positive for back pain and myalgias.   Skin:  Negative for rash.        Left leg pain.    Neurological:  Negative for syncope and headaches.   Psychiatric/Behavioral:  Negative for confusion and hallucinations.      Objective:     Vital Signs (Most Recent):  Temp: 98.5 °F (36.9 °C) (03/07/24 2011)  Pulse: 72 (03/07/24 2213)  Resp: (!) 26 (03/07/24 2213)  BP: 116/65 (03/07/24 2213)  SpO2: 95 % (03/07/24 2213) Vital Signs (24h  "Range):  Temp:  [98.5 °F (36.9 °C)] 98.5 °F (36.9 °C)  Pulse:  [72-90] 72  Resp:  [16-26] 26  SpO2:  [94 %-95 %] 95 %  BP: (113-116)/(65-75) 116/65     Weight: 86.6 kg (191 lb)  Body mass index is 30.83 kg/m².     Physical Exam  Vitals reviewed.   Constitutional:       Appearance: He is ill-appearing.   HENT:      Head: Normocephalic and atraumatic.      Mouth/Throat:      Mouth: Mucous membranes are dry.   Eyes:      Extraocular Movements: Extraocular movements intact.      Conjunctiva/sclera: Conjunctivae normal.      Pupils: Pupils are equal, round, and reactive to light.   Cardiovascular:      Rate and Rhythm: Normal rate and regular rhythm.   Pulmonary:      Effort: Pulmonary effort is normal. No respiratory distress.      Breath sounds: Normal breath sounds. No wheezing.   Abdominal:      General: Abdomen is flat. Bowel sounds are normal. There is no distension.      Palpations: Abdomen is soft.   Genitourinary:     Comments: Left CVA tenderness.   Musculoskeletal:         General: tenderness on palpation of the left thigh laterally. No swelling.      Cervical back: Normal range of motion and neck supple.   Skin:     General: Skin is warm.   Neurological:      General: No focal deficit present.      Mental Status: He is alert and oriented to person, place, and time.   Psychiatric:         Mood and Affect: Mood normal.         Behavior: Behavior normal.              CRANIAL NERVES     CN III, IV, VI   Pupils are equal, round, and reactive to light.       Significant Labs: All pertinent labs within the past 24 hours have been reviewed.  CBC:   Recent Labs   Lab 03/07/24 2040   WBC 14.58*   HGB 15.2   HCT 46.1        CMP:   Recent Labs   Lab 03/07/24 2040      K 3.8      CO2 19*   *   BUN 19   CREATININE 1.0   CALCIUM 9.1   PROT 7.4   ALBUMIN 4.0   BILITOT 0.8   ALKPHOS 98   AST 10   ALT 10   ANIONGAP 10     Coagulation: No results for input(s): "PT", "INR", "APTT" in the last 48 " "hours.  Lactic Acid:   Recent Labs   Lab 03/07/24 2040   LACTATE 1.0     Urine Culture: No results for input(s): "LABURIN" in the last 48 hours.  Urine Studies: No results for input(s): "COLORU", "APPEARANCEUA", "PHUR", "SPECGRAV", "PROTEINUA", "GLUCUA", "KETONESU", "BILIRUBINUA", "OCCULTUA", "NITRITE", "UROBILINOGEN", "LEUKOCYTESUR", "RBCUA", "WBCUA", "BACTERIA", "SQUAMEPITHEL", "HYALINECASTS" in the last 48 hours.    Invalid input(s): "WRIGHTSUR"    Significant Imaging: I have reviewed all pertinent imaging results/findings within the past 24 hours.  Assessment/Plan:     * Sepsis without acute organ dysfunction  Fever at home of 102, and leukocytosis on presentation.  CT abdomen/pelvis showed findings suspicious for pyelonephritis, pyelitis and cystitis.  UA is positive. Urine cx currently in process.   Blood cx collected.   Pt was started on zosyn and vanco which I will continue. Will consult ID.  Iv fluids for hydration.       Pyelonephritis  UA positive.  Urine and blood cx pending.  Continue zosyn and vanco for now.       Primary hypertension  Resume metoprolol with parameters.  Hold norvasc for now given his sepsis.    Left leg pain  No swelling, redness or trauma.   Will order CPK. Hold lipitor until result is back.  Will order duplex to rule out dvt given his cancer diagnose.  Will order x-ray left femur as pt's pain is mainly located left thigh laterally.       Type 2 diabetes mellitus without complication, without long-term current use of insulin  Humalog sliding scale.     Bladder cancer  [facility]  Urology follow up outpt.      CAD (coronary artery disease)  Resume aspirin, and metoprolol.       VTE Risk Mitigation (From admission, onward)           Ordered     enoxaparin injection 40 mg  Daily        Note to Pharmacy: Ht:    Wt: 86.6 kg (191 lb)  Estimated Creatinine Clearance: 63.8 mL/min (based on SCr of 1 mg/dL).  Body mass index is 30.83 kg/m².    03/07/24 1116     " IP VTE HIGH RISK PATIENT  Once         03/07/24 2344     Place sequential compression device  Until discontinued         03/07/24 2344                                    Windy Adam MD  Department of Hospital Medicine  [facility] - Emergency Dept           Alert and oriented to person, place and time

## 2025-04-16 ENCOUNTER — HOSPITAL ENCOUNTER (OUTPATIENT)
Facility: HOSPITAL | Age: 80
Discharge: HOME | End: 2025-04-17
Attending: STUDENT IN AN ORGANIZED HEALTH CARE EDUCATION/TRAINING PROGRAM
Payer: OTHER GOVERNMENT

## 2025-04-16 DIAGNOSIS — R06.2 WHEEZING: Primary | ICD-10-CM

## 2025-04-16 DIAGNOSIS — J20.9 ACUTE BRONCHITIS WITH WHEEZING: ICD-10-CM

## 2025-04-16 DIAGNOSIS — R07.9 CHEST PAIN: ICD-10-CM

## 2025-04-16 LAB
ABSOLUTE EOSINOPHIL (SMH): 0.01 K/UL
ABSOLUTE MONOCYTE (SMH): 0.56 K/UL (ref 0.3–1)
ABSOLUTE NEUTROPHIL COUNT (SMH): 8.2 K/UL (ref 1.8–7.7)
ALBUMIN SERPL-MCNC: 4.2 G/DL (ref 3.5–5.2)
ALLENS TEST: ABNORMAL
ALP SERPL-CCNC: 77 UNIT/L (ref 55–135)
ALT SERPL-CCNC: 14 UNIT/L (ref 10–44)
ANION GAP (SMH): 8 MMOL/L (ref 8–16)
AST SERPL-CCNC: 14 UNIT/L (ref 10–40)
BASOPHILS # BLD AUTO: 0.06 K/UL
BASOPHILS NFR BLD AUTO: 0.6 %
BILIRUB SERPL-MCNC: 0.7 MG/DL (ref 0.1–1)
BNP SERPL-MCNC: 84 PG/ML
BUN SERPL-MCNC: 15 MG/DL (ref 8–23)
CALCIUM SERPL-MCNC: 9.4 MG/DL (ref 8.7–10.5)
CHLORIDE SERPL-SCNC: 104 MMOL/L (ref 95–110)
CO2 SERPL-SCNC: 22 MMOL/L (ref 23–29)
CREAT SERPL-MCNC: 0.9 MG/DL (ref 0.5–1.4)
DELSYS: ABNORMAL
ERYTHROCYTE [DISTWIDTH] IN BLOOD BY AUTOMATED COUNT: 13.2 % (ref 11.5–14.5)
GFR SERPLBLD CREATININE-BSD FMLA CKD-EPI: >60 ML/MIN/1.73/M2
GLUCOSE SERPL-MCNC: 124 MG/DL (ref 70–110)
GLUCOSE SERPL-MCNC: 137 MG/DL (ref 70–110)
HCO3 UR-SCNC: 22.6 MMOL/L (ref 24–28)
HCT VFR BLD AUTO: 46.2 % (ref 40–54)
HCT VFR BLD CALC: 47 %PCV (ref 36–54)
HCV AB SERPL QL IA: NORMAL
HGB BLD-MCNC: 15.2 GM/DL (ref 14–18)
HIV 1+2 AB+HIV1 P24 AG SERPL QL IA: NORMAL
IMM GRANULOCYTES # BLD AUTO: 0.04 K/UL (ref 0–0.04)
IMM GRANULOCYTES NFR BLD AUTO: 0.4 % (ref 0–0.5)
INFLUENZA A MOLECULAR (OHS): NEGATIVE
INFLUENZA B MOLECULAR (OHS): NEGATIVE
LYMPHOCYTES # BLD AUTO: 0.57 K/UL (ref 1–4.8)
MAGNESIUM SERPL-MCNC: 1.8 MG/DL (ref 1.6–2.6)
MCH RBC QN AUTO: 28.6 PG (ref 27–31)
MCHC RBC AUTO-ENTMCNC: 32.9 G/DL (ref 32–36)
MCV RBC AUTO: 87 FL (ref 82–98)
NUCLEATED RBC (/100WBC) (SMH): 0 /100 WBC
PCO2 BLDA: 40.4 MMHG (ref 35–45)
PH SMN: 7.36 [PH] (ref 7.35–7.45)
PLATELET # BLD AUTO: 207 K/UL (ref 150–450)
PMV BLD AUTO: 10.4 FL (ref 9.2–12.9)
PO2 BLDA: 38 MMHG (ref 40–60)
POC BE: -3 MMOL/L
POC IONIZED CALCIUM: 1.23 MMOL/L (ref 1.06–1.42)
POC SATURATED O2: 70 % (ref 95–100)
POC TCO2: 24 MMOL/L (ref 24–29)
POCT GLUCOSE: 120 MG/DL (ref 70–110)
POTASSIUM BLD-SCNC: 3.6 MMOL/L (ref 3.5–5.1)
POTASSIUM SERPL-SCNC: 3.6 MMOL/L (ref 3.5–5.1)
PROT SERPL-MCNC: 7 GM/DL (ref 6–8.4)
RBC # BLD AUTO: 5.31 M/UL (ref 4.6–6.2)
RELATIVE EOSINOPHIL (SMH): 0.1 % (ref 0–8)
RELATIVE LYMPHOCYTE (SMH): 6.1 % (ref 18–48)
RELATIVE MONOCYTE (SMH): 5.9 % (ref 4–15)
RELATIVE NEUTROPHIL (SMH): 86.9 % (ref 38–73)
SAMPLE: ABNORMAL
SARS-COV-2 RDRP RESP QL NAA+PROBE: NEGATIVE
SITE: ABNORMAL
SODIUM BLD-SCNC: 140 MMOL/L (ref 136–145)
SODIUM SERPL-SCNC: 134 MMOL/L (ref 136–145)
TROPONIN HIGH SENSITIVE (SMH): 5.8 PG/ML
TROPONIN HIGH SENSITIVE (SMH): 6.6 PG/ML
WBC # BLD AUTO: 9.42 K/UL (ref 3.9–12.7)

## 2025-04-16 PROCEDURE — U0002 COVID-19 LAB TEST NON-CDC: HCPCS | Performed by: STUDENT IN AN ORGANIZED HEALTH CARE EDUCATION/TRAINING PROGRAM

## 2025-04-16 PROCEDURE — 83880 ASSAY OF NATRIURETIC PEPTIDE: CPT | Performed by: STUDENT IN AN ORGANIZED HEALTH CARE EDUCATION/TRAINING PROGRAM

## 2025-04-16 PROCEDURE — 63600175 PHARM REV CODE 636 W HCPCS: Mod: JZ,TB | Performed by: STUDENT IN AN ORGANIZED HEALTH CARE EDUCATION/TRAINING PROGRAM

## 2025-04-16 PROCEDURE — 94799 UNLISTED PULMONARY SVC/PX: CPT

## 2025-04-16 PROCEDURE — 84295 ASSAY OF SERUM SODIUM: CPT

## 2025-04-16 PROCEDURE — 96374 THER/PROPH/DIAG INJ IV PUSH: CPT

## 2025-04-16 PROCEDURE — G0378 HOSPITAL OBSERVATION PER HR: HCPCS

## 2025-04-16 PROCEDURE — 25000242 PHARM REV CODE 250 ALT 637 W/ HCPCS: Performed by: INTERNAL MEDICINE

## 2025-04-16 PROCEDURE — 25000003 PHARM REV CODE 250: Performed by: STUDENT IN AN ORGANIZED HEALTH CARE EDUCATION/TRAINING PROGRAM

## 2025-04-16 PROCEDURE — 25000003 PHARM REV CODE 250: Performed by: INTERNAL MEDICINE

## 2025-04-16 PROCEDURE — 94640 AIRWAY INHALATION TREATMENT: CPT

## 2025-04-16 PROCEDURE — 84484 ASSAY OF TROPONIN QUANT: CPT | Performed by: STUDENT IN AN ORGANIZED HEALTH CARE EDUCATION/TRAINING PROGRAM

## 2025-04-16 PROCEDURE — 85014 HEMATOCRIT: CPT | Mod: 91

## 2025-04-16 PROCEDURE — 93005 ELECTROCARDIOGRAM TRACING: CPT | Performed by: INTERNAL MEDICINE

## 2025-04-16 PROCEDURE — 83735 ASSAY OF MAGNESIUM: CPT | Performed by: STUDENT IN AN ORGANIZED HEALTH CARE EDUCATION/TRAINING PROGRAM

## 2025-04-16 PROCEDURE — 96376 TX/PRO/DX INJ SAME DRUG ADON: CPT

## 2025-04-16 PROCEDURE — 96361 HYDRATE IV INFUSION ADD-ON: CPT

## 2025-04-16 PROCEDURE — 99285 EMERGENCY DEPT VISIT HI MDM: CPT | Mod: 25

## 2025-04-16 PROCEDURE — 82330 ASSAY OF CALCIUM: CPT

## 2025-04-16 PROCEDURE — 96372 THER/PROPH/DIAG INJ SC/IM: CPT | Performed by: INTERNAL MEDICINE

## 2025-04-16 PROCEDURE — 80053 COMPREHEN METABOLIC PANEL: CPT | Performed by: STUDENT IN AN ORGANIZED HEALTH CARE EDUCATION/TRAINING PROGRAM

## 2025-04-16 PROCEDURE — 93010 ELECTROCARDIOGRAM REPORT: CPT | Mod: ,,, | Performed by: INTERNAL MEDICINE

## 2025-04-16 PROCEDURE — 99900031 HC PATIENT EDUCATION (STAT)

## 2025-04-16 PROCEDURE — 94761 N-INVAS EAR/PLS OXIMETRY MLT: CPT | Mod: XB

## 2025-04-16 PROCEDURE — 82962 GLUCOSE BLOOD TEST: CPT

## 2025-04-16 PROCEDURE — 25000242 PHARM REV CODE 250 ALT 637 W/ HCPCS: Performed by: STUDENT IN AN ORGANIZED HEALTH CARE EDUCATION/TRAINING PROGRAM

## 2025-04-16 PROCEDURE — 87502 INFLUENZA DNA AMP PROBE: CPT | Performed by: STUDENT IN AN ORGANIZED HEALTH CARE EDUCATION/TRAINING PROGRAM

## 2025-04-16 PROCEDURE — 63600175 PHARM REV CODE 636 W HCPCS: Performed by: INTERNAL MEDICINE

## 2025-04-16 PROCEDURE — 99900035 HC TECH TIME PER 15 MIN (STAT)

## 2025-04-16 PROCEDURE — 82803 BLOOD GASES ANY COMBINATION: CPT

## 2025-04-16 PROCEDURE — 0202U NFCT DS 22 TRGT SARS-COV-2: CPT | Performed by: INTERNAL MEDICINE

## 2025-04-16 PROCEDURE — 87389 HIV-1 AG W/HIV-1&-2 AB AG IA: CPT | Performed by: EMERGENCY MEDICINE

## 2025-04-16 PROCEDURE — 84132 ASSAY OF SERUM POTASSIUM: CPT

## 2025-04-16 PROCEDURE — 94640 AIRWAY INHALATION TREATMENT: CPT | Mod: XB

## 2025-04-16 PROCEDURE — 86803 HEPATITIS C AB TEST: CPT | Performed by: EMERGENCY MEDICINE

## 2025-04-16 PROCEDURE — 85025 COMPLETE CBC W/AUTO DIFF WBC: CPT | Performed by: STUDENT IN AN ORGANIZED HEALTH CARE EDUCATION/TRAINING PROGRAM

## 2025-04-16 RX ORDER — TALC
6 POWDER (GRAM) TOPICAL NIGHTLY PRN
Status: DISCONTINUED | OUTPATIENT
Start: 2025-04-16 | End: 2025-04-17 | Stop reason: HOSPADM

## 2025-04-16 RX ORDER — SODIUM,POTASSIUM PHOSPHATES 280-250MG
2 POWDER IN PACKET (EA) ORAL
Status: DISCONTINUED | OUTPATIENT
Start: 2025-04-16 | End: 2025-04-17 | Stop reason: HOSPADM

## 2025-04-16 RX ORDER — IPRATROPIUM BROMIDE AND ALBUTEROL SULFATE 2.5; .5 MG/3ML; MG/3ML
3 SOLUTION RESPIRATORY (INHALATION)
Status: COMPLETED | OUTPATIENT
Start: 2025-04-16 | End: 2025-04-16

## 2025-04-16 RX ORDER — INSULIN ASPART 100 [IU]/ML
0-10 INJECTION, SOLUTION INTRAVENOUS; SUBCUTANEOUS
Status: DISCONTINUED | OUTPATIENT
Start: 2025-04-16 | End: 2025-04-17 | Stop reason: HOSPADM

## 2025-04-16 RX ORDER — OSELTAMIVIR PHOSPHATE 75 MG/1
75 CAPSULE ORAL 2 TIMES DAILY
Status: DISCONTINUED | OUTPATIENT
Start: 2025-04-16 | End: 2025-04-17 | Stop reason: HOSPADM

## 2025-04-16 RX ORDER — IBUPROFEN 200 MG
24 TABLET ORAL
Status: DISCONTINUED | OUTPATIENT
Start: 2025-04-16 | End: 2025-04-17 | Stop reason: HOSPADM

## 2025-04-16 RX ORDER — IPRATROPIUM BROMIDE AND ALBUTEROL SULFATE 2.5; .5 MG/3ML; MG/3ML
3 SOLUTION RESPIRATORY (INHALATION) EVERY 4 HOURS
Status: DISCONTINUED | OUTPATIENT
Start: 2025-04-17 | End: 2025-04-17 | Stop reason: HOSPADM

## 2025-04-16 RX ORDER — SODIUM CHLORIDE 9 MG/ML
INJECTION, SOLUTION INTRAVENOUS CONTINUOUS
Status: DISCONTINUED | OUTPATIENT
Start: 2025-04-16 | End: 2025-04-17 | Stop reason: HOSPADM

## 2025-04-16 RX ORDER — METOPROLOL SUCCINATE 25 MG/1
25 TABLET, EXTENDED RELEASE ORAL DAILY
Status: DISCONTINUED | OUTPATIENT
Start: 2025-04-17 | End: 2025-04-17 | Stop reason: HOSPADM

## 2025-04-16 RX ORDER — LANOLIN ALCOHOL/MO/W.PET/CERES
800 CREAM (GRAM) TOPICAL
Status: DISCONTINUED | OUTPATIENT
Start: 2025-04-16 | End: 2025-04-17 | Stop reason: HOSPADM

## 2025-04-16 RX ORDER — ALBUTEROL SULFATE 0.83 MG/ML
7.5 SOLUTION RESPIRATORY (INHALATION) CONTINUOUS
Status: DISCONTINUED | OUTPATIENT
Start: 2025-04-16 | End: 2025-04-17

## 2025-04-16 RX ORDER — IPRATROPIUM BROMIDE AND ALBUTEROL SULFATE 2.5; .5 MG/3ML; MG/3ML
3 SOLUTION RESPIRATORY (INHALATION) EVERY 6 HOURS
Status: DISCONTINUED | OUTPATIENT
Start: 2025-04-17 | End: 2025-04-16

## 2025-04-16 RX ORDER — SILDENAFIL 100 MG/1
100 TABLET, FILM COATED ORAL
COMMUNITY

## 2025-04-16 RX ORDER — ASPIRIN 81 MG/1
81 TABLET ORAL DAILY
Status: DISCONTINUED | OUTPATIENT
Start: 2025-04-17 | End: 2025-04-17 | Stop reason: HOSPADM

## 2025-04-16 RX ORDER — ATORVASTATIN CALCIUM 40 MG/1
80 TABLET, FILM COATED ORAL NIGHTLY
Status: DISCONTINUED | OUTPATIENT
Start: 2025-04-16 | End: 2025-04-17 | Stop reason: HOSPADM

## 2025-04-16 RX ORDER — AMLODIPINE BESYLATE 5 MG/1
5 TABLET ORAL DAILY
Status: DISCONTINUED | OUTPATIENT
Start: 2025-04-17 | End: 2025-04-17 | Stop reason: HOSPADM

## 2025-04-16 RX ORDER — FAMOTIDINE 20 MG/1
20 TABLET, FILM COATED ORAL 2 TIMES DAILY
Status: DISCONTINUED | OUTPATIENT
Start: 2025-04-16 | End: 2025-04-17 | Stop reason: HOSPADM

## 2025-04-16 RX ORDER — METHYLPREDNISOLONE SOD SUCC 125 MG
40 VIAL (EA) INJECTION EVERY 6 HOURS
Status: DISCONTINUED | OUTPATIENT
Start: 2025-04-16 | End: 2025-04-17 | Stop reason: HOSPADM

## 2025-04-16 RX ORDER — ACETAMINOPHEN 325 MG/1
650 TABLET ORAL EVERY 8 HOURS PRN
Status: DISCONTINUED | OUTPATIENT
Start: 2025-04-16 | End: 2025-04-17 | Stop reason: HOSPADM

## 2025-04-16 RX ORDER — ENOXAPARIN SODIUM 100 MG/ML
40 INJECTION SUBCUTANEOUS EVERY 24 HOURS
Status: DISCONTINUED | OUTPATIENT
Start: 2025-04-16 | End: 2025-04-17 | Stop reason: HOSPADM

## 2025-04-16 RX ORDER — GLUCAGON 1 MG
1 KIT INJECTION
Status: DISCONTINUED | OUTPATIENT
Start: 2025-04-16 | End: 2025-04-17 | Stop reason: HOSPADM

## 2025-04-16 RX ORDER — METHYLPREDNISOLONE SOD SUCC 125 MG
125 VIAL (EA) INJECTION
Status: COMPLETED | OUTPATIENT
Start: 2025-04-16 | End: 2025-04-16

## 2025-04-16 RX ORDER — ALPRAZOLAM 0.5 MG/1
0.5 TABLET ORAL 3 TIMES DAILY PRN
Status: DISCONTINUED | OUTPATIENT
Start: 2025-04-16 | End: 2025-04-17 | Stop reason: HOSPADM

## 2025-04-16 RX ORDER — NALOXONE HYDROCHLORIDE 4 MG/.1ML
1 SPRAY NASAL ONCE
COMMUNITY

## 2025-04-16 RX ORDER — ALUMINUM HYDROXIDE, MAGNESIUM HYDROXIDE, AND SIMETHICONE 1200; 120; 1200 MG/30ML; MG/30ML; MG/30ML
30 SUSPENSION ORAL 4 TIMES DAILY PRN
Status: DISCONTINUED | OUTPATIENT
Start: 2025-04-16 | End: 2025-04-17 | Stop reason: HOSPADM

## 2025-04-16 RX ORDER — ACETAMINOPHEN 325 MG/1
650 TABLET ORAL EVERY 4 HOURS PRN
Status: DISCONTINUED | OUTPATIENT
Start: 2025-04-16 | End: 2025-04-17 | Stop reason: HOSPADM

## 2025-04-16 RX ORDER — IBUPROFEN 200 MG
16 TABLET ORAL
Status: DISCONTINUED | OUTPATIENT
Start: 2025-04-16 | End: 2025-04-17 | Stop reason: HOSPADM

## 2025-04-16 RX ORDER — NALOXONE HCL 0.4 MG/ML
0.02 VIAL (ML) INJECTION
Status: DISCONTINUED | OUTPATIENT
Start: 2025-04-16 | End: 2025-04-17 | Stop reason: HOSPADM

## 2025-04-16 RX ORDER — ONDANSETRON HYDROCHLORIDE 2 MG/ML
4 INJECTION, SOLUTION INTRAVENOUS EVERY 6 HOURS PRN
Status: DISCONTINUED | OUTPATIENT
Start: 2025-04-16 | End: 2025-04-17 | Stop reason: HOSPADM

## 2025-04-16 RX ADMIN — IPRATROPIUM BROMIDE AND ALBUTEROL SULFATE 3 ML: 2.5; .5 SOLUTION RESPIRATORY (INHALATION) at 11:04

## 2025-04-16 RX ADMIN — SODIUM CHLORIDE 500 ML: 9 INJECTION, SOLUTION INTRAVENOUS at 07:04

## 2025-04-16 RX ADMIN — SODIUM CHLORIDE: 9 INJECTION, SOLUTION INTRAVENOUS at 10:04

## 2025-04-16 RX ADMIN — ENOXAPARIN SODIUM 40 MG: 40 INJECTION SUBCUTANEOUS at 10:04

## 2025-04-16 RX ADMIN — OSELTAMAVIR PHOSPHATE 75 MG: 75 CAPSULE ORAL at 10:04

## 2025-04-16 RX ADMIN — FAMOTIDINE 20 MG: 20 TABLET, FILM COATED ORAL at 10:04

## 2025-04-16 RX ADMIN — ACETAMINOPHEN 650 MG: 325 TABLET ORAL at 11:04

## 2025-04-16 RX ADMIN — METHYLPREDNISOLONE SODIUM SUCCINATE 125 MG: 125 INJECTION, POWDER, FOR SOLUTION INTRAMUSCULAR; INTRAVENOUS at 09:04

## 2025-04-16 RX ADMIN — ALBUTEROL SULFATE 7.5 MG: 2.5 SOLUTION RESPIRATORY (INHALATION) at 09:04

## 2025-04-16 RX ADMIN — METHYLPREDNISOLONE SODIUM SUCCINATE 40 MG: 125 INJECTION, POWDER, FOR SOLUTION INTRAMUSCULAR; INTRAVENOUS at 11:04

## 2025-04-16 RX ADMIN — IPRATROPIUM BROMIDE AND ALBUTEROL SULFATE 3 ML: 2.5; .5 SOLUTION RESPIRATORY (INHALATION) at 07:04

## 2025-04-16 RX ADMIN — ATORVASTATIN CALCIUM 80 MG: 40 TABLET, FILM COATED ORAL at 10:04

## 2025-04-17 VITALS
RESPIRATION RATE: 19 BRPM | HEIGHT: 66 IN | TEMPERATURE: 98 F | DIASTOLIC BLOOD PRESSURE: 78 MMHG | BODY MASS INDEX: 34.65 KG/M2 | SYSTOLIC BLOOD PRESSURE: 125 MMHG | WEIGHT: 215.63 LBS | OXYGEN SATURATION: 95 % | HEART RATE: 73 BPM

## 2025-04-17 PROBLEM — J20.9 ACUTE BRONCHITIS WITH WHEEZING: Status: RESOLVED | Noted: 2025-04-16 | Resolved: 2025-04-17

## 2025-04-17 LAB
ABSOLUTE EOSINOPHIL (SMH): 0 K/UL
ABSOLUTE MONOCYTE (SMH): 0.11 K/UL (ref 0.3–1)
ABSOLUTE NEUTROPHIL COUNT (SMH): 7.7 K/UL (ref 1.8–7.7)
ADENOVIRUS (SMH): NOT DETECTED
ALBUMIN SERPL-MCNC: 4 G/DL (ref 3.5–5.2)
ALP SERPL-CCNC: 77 UNIT/L (ref 55–135)
ALT SERPL-CCNC: 14 UNIT/L (ref 10–44)
AMPHET UR QL SCN: NEGATIVE
ANION GAP (SMH): 11 MMOL/L (ref 8–16)
AST SERPL-CCNC: 18 UNIT/L (ref 10–40)
BARBITURATE SCN PRESENT UR: NEGATIVE
BASOPHILS # BLD AUTO: 0.03 K/UL
BASOPHILS NFR BLD AUTO: 0.4 %
BENZODIAZ UR QL SCN: NEGATIVE
BILIRUB SERPL-MCNC: 0.8 MG/DL (ref 0.1–1)
BORDETELLA PARAPERTUSSIS (IS1001) (SMH): NOT DETECTED
BORDETELLA PERTUSSIS (PTXP) (SMH): NOT DETECTED
BUN SERPL-MCNC: 16 MG/DL (ref 8–23)
CALCIUM SERPL-MCNC: 8.8 MG/DL (ref 8.7–10.5)
CANNABINOIDS UR QL SCN: NEGATIVE
CHLAMYDIA PNEUMONIAE (SMH): NOT DETECTED
CHLORIDE SERPL-SCNC: 107 MMOL/L (ref 95–110)
CO2 SERPL-SCNC: 20 MMOL/L (ref 23–29)
COCAINE UR QL SCN: NEGATIVE
CORONAVIRUS 229E, COMMON COLD VIRUS (SMH): NOT DETECTED
CORONAVIRUS HKU1, COMMON COLD VIRUS (SMH): NOT DETECTED
CORONAVIRUS NL63, COMMON COLD VIRUS (SMH): NOT DETECTED
CORONAVIRUS OC43, COMMON COLD VIRUS (SMH): NOT DETECTED
CREAT SERPL-MCNC: 0.9 MG/DL (ref 0.5–1.4)
CREAT UR-MCNC: 66.1 MG/DL (ref 23–375)
ERYTHROCYTE [DISTWIDTH] IN BLOOD BY AUTOMATED COUNT: 13.2 % (ref 11.5–14.5)
FLUBV RNA NPH QL NAA+NON-PROBE: NOT DETECTED
GFR SERPLBLD CREATININE-BSD FMLA CKD-EPI: >60 ML/MIN/1.73/M2
GLUCOSE SERPL-MCNC: 178 MG/DL (ref 70–110)
GROUP A STREP MOLECULAR (OHS): NEGATIVE
HCT VFR BLD AUTO: 46.2 % (ref 40–54)
HGB BLD-MCNC: 15.1 GM/DL (ref 14–18)
HPIV1 RNA NPH QL NAA+NON-PROBE: NOT DETECTED
HPIV2 RNA NPH QL NAA+NON-PROBE: NOT DETECTED
HPIV3 RNA NPH QL NAA+NON-PROBE: NOT DETECTED
HPIV4 RNA NPH QL NAA+NON-PROBE: NOT DETECTED
HUMAN METAPNEUMOVIRUS (SMH): NOT DETECTED
IMM GRANULOCYTES # BLD AUTO: 0.05 K/UL (ref 0–0.04)
IMM GRANULOCYTES NFR BLD AUTO: 0.6 % (ref 0–0.5)
LYMPHOCYTES # BLD AUTO: 0.37 K/UL (ref 1–4.8)
Lab: DETECTED
MAGNESIUM SERPL-MCNC: 1.8 MG/DL (ref 1.6–2.6)
MCH RBC QN AUTO: 29 PG (ref 27–31)
MCHC RBC AUTO-ENTMCNC: 32.7 G/DL (ref 32–36)
MCV RBC AUTO: 89 FL (ref 82–98)
MYCOPLASMA PNEUMONIAE (SMH): NOT DETECTED
NUCLEATED RBC (/100WBC) (SMH): 0 /100 WBC
OHS QRS DURATION: 80 MS
OHS QTC CALCULATION: 397 MS
OPIATES UR QL SCN: ABNORMAL
PCP UR QL: NEGATIVE
PLATELET # BLD AUTO: 194 K/UL (ref 150–450)
PMV BLD AUTO: 10.5 FL (ref 9.2–12.9)
POCT GLUCOSE: 180 MG/DL (ref 70–110)
POTASSIUM SERPL-SCNC: 3.9 MMOL/L (ref 3.5–5.1)
PROT SERPL-MCNC: 7 GM/DL (ref 6–8.4)
RBC # BLD AUTO: 5.2 M/UL (ref 4.6–6.2)
RELATIVE EOSINOPHIL (SMH): 0 % (ref 0–8)
RELATIVE LYMPHOCYTE (SMH): 4.5 % (ref 18–48)
RELATIVE MONOCYTE (SMH): 1.3 % (ref 4–15)
RELATIVE NEUTROPHIL (SMH): 93.2 % (ref 38–73)
RESPIRATORY INFECTION PANEL SOURCE (SMH): ABNORMAL
RSV RNA NPH QL NAA+NON-PROBE: NOT DETECTED
RV+EV RNA NPH QL NAA+NON-PROBE: NOT DETECTED
SARS-COV-2 RNA RESP QL NAA+PROBE: NOT DETECTED
SODIUM SERPL-SCNC: 138 MMOL/L (ref 136–145)
WBC # BLD AUTO: 8.21 K/UL (ref 3.9–12.7)

## 2025-04-17 PROCEDURE — 25000003 PHARM REV CODE 250: Performed by: STUDENT IN AN ORGANIZED HEALTH CARE EDUCATION/TRAINING PROGRAM

## 2025-04-17 PROCEDURE — 80307 DRUG TEST PRSMV CHEM ANLYZR: CPT | Performed by: INTERNAL MEDICINE

## 2025-04-17 PROCEDURE — G0378 HOSPITAL OBSERVATION PER HR: HCPCS

## 2025-04-17 PROCEDURE — 83735 ASSAY OF MAGNESIUM: CPT | Performed by: INTERNAL MEDICINE

## 2025-04-17 PROCEDURE — 94640 AIRWAY INHALATION TREATMENT: CPT | Mod: XB

## 2025-04-17 PROCEDURE — 36415 COLL VENOUS BLD VENIPUNCTURE: CPT | Performed by: INTERNAL MEDICINE

## 2025-04-17 PROCEDURE — 85025 COMPLETE CBC W/AUTO DIFF WBC: CPT | Performed by: INTERNAL MEDICINE

## 2025-04-17 PROCEDURE — 96376 TX/PRO/DX INJ SAME DRUG ADON: CPT

## 2025-04-17 PROCEDURE — 96361 HYDRATE IV INFUSION ADD-ON: CPT

## 2025-04-17 PROCEDURE — 25000242 PHARM REV CODE 250 ALT 637 W/ HCPCS: Performed by: INTERNAL MEDICINE

## 2025-04-17 PROCEDURE — 63600175 PHARM REV CODE 636 W HCPCS: Mod: JZ | Performed by: INTERNAL MEDICINE

## 2025-04-17 PROCEDURE — 87651 STREP A DNA AMP PROBE: CPT | Performed by: INTERNAL MEDICINE

## 2025-04-17 PROCEDURE — 25000003 PHARM REV CODE 250: Performed by: INTERNAL MEDICINE

## 2025-04-17 PROCEDURE — 27000221 HC OXYGEN, UP TO 24 HOURS

## 2025-04-17 PROCEDURE — 94761 N-INVAS EAR/PLS OXIMETRY MLT: CPT

## 2025-04-17 PROCEDURE — 80053 COMPREHEN METABOLIC PANEL: CPT | Performed by: INTERNAL MEDICINE

## 2025-04-17 RX ORDER — AZITHROMYCIN 250 MG/1
TABLET, FILM COATED ORAL
Qty: 6 TABLET | Refills: 0 | Status: SHIPPED | OUTPATIENT
Start: 2025-04-17

## 2025-04-17 RX ORDER — PREDNISONE 20 MG/1
40 TABLET ORAL DAILY
Qty: 10 TABLET | Refills: 0 | Status: SHIPPED | OUTPATIENT
Start: 2025-04-17 | End: 2025-04-22

## 2025-04-17 RX ORDER — AZITHROMYCIN 250 MG/1
TABLET, FILM COATED ORAL
Qty: 6 TABLET | Refills: 0 | Status: SHIPPED | OUTPATIENT
Start: 2025-04-17 | End: 2025-04-17

## 2025-04-17 RX ORDER — PREDNISONE 20 MG/1
40 TABLET ORAL DAILY
Qty: 10 TABLET | Refills: 0 | Status: SHIPPED | OUTPATIENT
Start: 2025-04-17 | End: 2025-04-17

## 2025-04-17 RX ORDER — OSELTAMIVIR PHOSPHATE 75 MG/1
75 CAPSULE ORAL 2 TIMES DAILY
Qty: 10 CAPSULE | Refills: 0 | Status: SHIPPED | OUTPATIENT
Start: 2025-04-17 | End: 2025-04-22

## 2025-04-17 RX ORDER — OXYCODONE AND ACETAMINOPHEN 5; 325 MG/1; MG/1
1 TABLET ORAL EVERY 6 HOURS PRN
Refills: 0 | Status: DISCONTINUED | OUTPATIENT
Start: 2025-04-17 | End: 2025-04-17 | Stop reason: HOSPADM

## 2025-04-17 RX ADMIN — METHYLPREDNISOLONE SODIUM SUCCINATE 40 MG: 125 INJECTION, POWDER, FOR SOLUTION INTRAMUSCULAR; INTRAVENOUS at 11:04

## 2025-04-17 RX ADMIN — METOPROLOL SUCCINATE 25 MG: 25 TABLET, EXTENDED RELEASE ORAL at 08:04

## 2025-04-17 RX ADMIN — OXYCODONE HYDROCHLORIDE AND ACETAMINOPHEN 1 TABLET: 5; 325 TABLET ORAL at 12:04

## 2025-04-17 RX ADMIN — METHYLPREDNISOLONE SODIUM SUCCINATE 40 MG: 125 INJECTION, POWDER, FOR SOLUTION INTRAMUSCULAR; INTRAVENOUS at 05:04

## 2025-04-17 RX ADMIN — IPRATROPIUM BROMIDE AND ALBUTEROL SULFATE 3 ML: 2.5; .5 SOLUTION RESPIRATORY (INHALATION) at 11:04

## 2025-04-17 RX ADMIN — FAMOTIDINE 20 MG: 20 TABLET, FILM COATED ORAL at 08:04

## 2025-04-17 RX ADMIN — OSELTAMAVIR PHOSPHATE 75 MG: 75 CAPSULE ORAL at 08:04

## 2025-04-17 RX ADMIN — AMLODIPINE BESYLATE 5 MG: 5 TABLET ORAL at 08:04

## 2025-04-17 RX ADMIN — SODIUM CHLORIDE: 9 INJECTION, SOLUTION INTRAVENOUS at 02:04

## 2025-04-17 RX ADMIN — Medication 800 MG: at 08:04

## 2025-04-17 RX ADMIN — OXYCODONE HYDROCHLORIDE AND ACETAMINOPHEN 1 TABLET: 5; 325 TABLET ORAL at 06:04

## 2025-04-17 RX ADMIN — ASPIRIN 81 MG: 81 TABLET, COATED ORAL at 08:04

## 2025-04-17 RX ADMIN — IPRATROPIUM BROMIDE AND ALBUTEROL SULFATE 3 ML: 2.5; .5 SOLUTION RESPIRATORY (INHALATION) at 06:04

## 2025-04-17 RX ADMIN — Medication 800 MG: at 12:04

## 2025-04-17 NOTE — HPI
79 year old pt getting admitted with acute bronchitis with wheezing / Viral syndrome  Pt started having URTI like symptoms yesterday  Later he had fever/myalgia and chills  This was followed by SOB along with wheeze associated with cough   He said his wife was diagnosed with flu recently   Pt is an ex smoker   Symptoms went worse and he came to ER and got admitted

## 2025-04-17 NOTE — ED PROVIDER NOTES
Encounter Date: 4/16/2025       History     Chief Complaint   Patient presents with    Fever    Weakness    Influenza     HPI  79-year-old man presents for flu-like illness that started last night he reports feeling weak all over.  He reports fever and chills.  He reports dry cough.  He denies chest pain.  He reports being short of breath.  He denies belly pain nausea or vomiting change in bowel or bladder habits.  His wife was diagnosed with the flu and has a similar symptoms although not as severe.  He has a history of CAD without PCI.  He has a 40+ year smoking history although he stopped 5-10 years ago.  Has not ever been diagnosed with COPD  Review of patient's allergies indicates:  No Known Allergies  Past Medical History:   Diagnosis Date    Left leg pain 03/07/2024     Past Surgical History:   Procedure Laterality Date    CYSTOSCOPY       Family History   Problem Relation Name Age of Onset    Hypertension Mother       Social History[1]  Review of Systems   All other systems reviewed and are negative.      Physical Exam     Initial Vitals [04/16/25 1731]   BP Pulse Resp Temp SpO2   113/69 75 18 98.8 °F (37.1 °C) (!) 92 %      MAP       --         Physical Exam    Nursing note and vitals reviewed.  Constitutional: He appears well-developed and well-nourished.   HENT:   Head: Normocephalic and atraumatic.   Eyes: Right eye exhibits no discharge. Left eye exhibits no discharge.   Neck: No tracheal deviation present.   Cardiovascular:  Normal rate and regular rhythm.           Pulmonary/Chest: No stridor. He has wheezes.   Normal work of breathing speaking in full sentences, Wheezes throughout   Abdominal: Abdomen is soft. Bowel sounds are normal. There is no abdominal tenderness.   Musculoskeletal:         General: No edema.     Neurological: He is alert and oriented to person, place, and time.   Cranial nerves 2-12 intact no upper or lower extremity drift sensation intact to light touch   Skin: Skin is warm and  dry.         ED Course   Procedures  Labs Reviewed   COMPREHENSIVE METABOLIC PANEL - Abnormal       Result Value    Sodium 134 (*)     Potassium 3.6      Chloride 104      CO2 22 (*)     Glucose 137 (*)     BUN 15      Creatinine 0.9      Calcium 9.4      Protein Total 7.0      Albumin 4.2      Bilirubin Total 0.7      ALP 77      AST 14      ALT 14      Anion Gap 8      eGFR >60     CBC WITH DIFFERENTIAL - Abnormal    WBC 9.42      RBC 5.31      Hgb 15.2      Hct 46.2      MCV 87      MCH 28.6      MCHC 32.9      RDW 13.2      Platelet Count 207      MPV 10.4      Nucleated RBC 0      Neut % 86.9 (*)     Lymph % 6.1 (*)     Mono % 5.9      Eos % 0.1      Basophil % 0.6      Imm Grans % 0.4      Neut # 8.2 (*)     Lymph # 0.57 (*)     Mono # 0.56      Eos # 0.01      Baso # 0.06      Imm Grans # 0.04     ISTAT PROCEDURE - Abnormal    POC PH 7.356      POC PCO2 40.4      POC PO2 38 (*)     POC HCO3 22.6 (*)     POC BE -3 (*)     POC SATURATED O2 70      POC Glucose 124 (*)     POC Sodium 140      POC Potassium 3.6      POC TCO2 24      POC Ionized Calcium 1.23      POC Hematocrit 47      Sample VENOUS      Site Other      Allens Test N/A      DelSys Room Air     POCT GLUCOSE - Abnormal    POCT Glucose 120 (*)    INFLUENZA A & B BY MOLECULAR - Normal    INFLUENZA A MOLECULAR Negative      INFLUENZA B MOLECULAR  Negative     HEPATITIS C ANTIBODY - Normal    Hep C Ab Interp Non-Reactive     HIV 1 / 2 ANTIBODY - Normal    HIV 1/2 Ag/Ab Non-Reactive     MAGNESIUM - Normal    Magnesium 1.8     TROPONIN I HIGH SENSITIVITY - Normal    Troponin High Sensitive 6.6     TROPONIN I HIGH SENSITIVITY - Normal    Troponin High Sensitive 5.8     B-TYPE NATRIURETIC PEPTIDE - Normal    BNP 84     SARS-COV-2 RNA AMPLIFICATION, QUAL - Normal    SARS COV-2 Molecular Negative     RESPIRATORY INFECTION PANEL (PCR), NASOPHARYNGEAL   CULTURE, STREP A,  THROAT   CBC W/ AUTO DIFFERENTIAL    Narrative:     The following orders were created for  panel order CBC auto differential.  Procedure                               Abnormality         Status                     ---------                               -----------         ------                     CBC with Differential[[id number]]       Abnormal            Final result                 Please view results for these tests on the individual orders.   DRUG SCREEN PANEL, URINE EMERGENCY   POCT GLUCOSE MONITORING CONTINUOUS          Imaging Results              X-Ray Chest AP Portable (Final result)  Result time 04/16/25 18:51:02      Final result by Slim Randle MD (04/16/25 18:51:02)                   Impression:      Stable examination.      Electronically signed by: Slim Randle MD  Date:    04/16/2025  Time:    18:51               Narrative:    EXAMINATION:  XR CHEST AP PORTABLE    CLINICAL HISTORY:  Chest Pain;    TECHNIQUE:  Single frontal view of the chest was performed.    COMPARISON:  09/14/2024.    FINDINGS:  Monitoring EKG leads are present.  The trachea is unremarkable.  The cardiomediastinal silhouette is stable.  There are no pleural effusions.  There is no evidence of a pneumothorax.  There is no evidence of pneumomediastinum.  No airspace opacity is present.    There is no evidence of free air beneath the hemidiaphragms.  There are degenerative changes in the osseous structures.                                       Medications   albuterol nebulizer solution 7.5 mg (7.5 mg Nebulization New Bag 4/16/25 2123)   0.9% NaCl infusion ( Intravenous New Bag 4/16/25 2219)   amLODIPine tablet 5 mg (has no administration in time range)   aspirin EC tablet 81 mg (has no administration in time range)   atorvastatin tablet 80 mg (80 mg Oral Given 4/16/25 2210)   metoprolol succinate (TOPROL-XL) 24 hr tablet 25 mg (has no administration in time range)   melatonin tablet 6 mg (has no administration in time range)   ondansetron injection 4 mg (has no administration in time range)   acetaminophen tablet 650  mg (has no administration in time range)   aluminum-magnesium hydroxide-simethicone 200-200-20 mg/5 mL suspension 30 mL (has no administration in time range)   acetaminophen tablet 650 mg (has no administration in time range)   naloxone 0.4 mg/mL injection 0.02 mg (has no administration in time range)   potassium bicarbonate disintegrating tablet 50 mEq (has no administration in time range)   potassium bicarbonate disintegrating tablet 35 mEq (has no administration in time range)   potassium bicarbonate disintegrating tablet 60 mEq (has no administration in time range)   magnesium oxide tablet 800 mg (has no administration in time range)   magnesium oxide tablet 800 mg (has no administration in time range)   potassium, sodium phosphates 280-160-250 mg packet 2 packet (has no administration in time range)   potassium, sodium phosphates 280-160-250 mg packet 2 packet (has no administration in time range)   potassium, sodium phosphates 280-160-250 mg packet 2 packet (has no administration in time range)   glucose chewable tablet 16 g (has no administration in time range)   glucose chewable tablet 24 g (has no administration in time range)   dextrose 50% injection 12.5 g (has no administration in time range)   dextrose 50% injection 25 g (has no administration in time range)   glucagon (human recombinant) injection 1 mg (has no administration in time range)   enoxaparin injection 40 mg (40 mg Subcutaneous Given 4/16/25 2210)   insulin aspart U-100 pen 0-10 Units (has no administration in time range)   albuterol-ipratropium 2.5 mg-0.5 mg/3 mL nebulizer solution 3 mL (has no administration in time range)   famotidine tablet 20 mg (20 mg Oral Given 4/16/25 2210)   ALPRAZolam tablet 0.5 mg (has no administration in time range)   oseltamivir capsule 75 mg (75 mg Oral Given 4/16/25 2210)   methylPREDNISolone sodium succinate injection 40 mg (has no administration in time range)   albuterol-ipratropium 2.5 mg-0.5 mg/3 mL  nebulizer solution 3 mL (3 mLs Nebulization Given 4/16/25 1900)   sodium chloride 0.9% bolus 500 mL 500 mL (0 mLs Intravenous Stopped 4/16/25 2100)   methylPREDNISolone sodium succinate injection 125 mg (125 mg Intravenous Given 4/16/25 2114)     Medical Decision Making  Feeling weak in the setting of flu-like illness his vitals are reassuring, he is well-appearing on exam, he has a normal neurologic exam he has wheezes throughout but normal work of breathing, differential includes viral syndrome pneumonia reactive airway disease CAD metabolic or endocrine derangement.  With his history I will get cardiac workup.  I will give him a DuoNeb to see if that helps with the symptoms he may have some occult emphysema with his significant smoking history that has been exacerbated by his likely flu illness.    His breathing improved somewhat after the 1st DuoNeb but he did not feel well enough to go home, we had a shared decision-making discussion he will admit him for reactive airway disease.  I did not give him antibiotics but he does not have a lobar process.  My suspicion in his that he has a flu, discussed with hospital medicine for admission    Amount and/or Complexity of Data Reviewed  Independent Historian: spouse     Details: Reports she was diagnosed with flu  Labs: ordered.  Radiology: ordered and independent interpretation performed. Decision-making details documented in ED Course.  ECG/medicine tests: ordered and independent interpretation performed. Decision-making details documented in ED Course.    Risk  Prescription drug management.  Decision regarding hospitalization.               ED Course as of 04/16/25 2324 Wed Apr 16, 2025 1829 EKG on my independent interpretation proximally 62 beats per minute left axis no STEMI QTC less than 500 [IC]   1927 No evidence of pneumothorax on my independent interpretation of chest x-ray [IC]   2059 He is still wheezing, he still feels short of breath, I will give him  additional bronchodilators I will give him steroids, we will admit him to the hospital for likely reactive airway disease in the setting of viral illness [IC]      ED Course User Index  [IC] Cedrick Lopez MD                           Clinical Impression:  Final diagnoses:  [R07.9] Chest pain  [R06.2] Wheezing (Primary)  [J20.9] Acute bronchitis with wheezing          ED Disposition Condition    Observation                   [1]   Social History  Substance Use Topics    Alcohol use: Not Currently        Cedrick Lopez MD  04/16/25 1853

## 2025-04-17 NOTE — DISCHARGE SUMMARY
[facility] Medicine  Discharge Summary      Patient Name: Arcadio Begum  MRN: [id number]  JAYCOB: [id number]  Patient Class: OP- Observation  Admission Date: 4/16/2025  Hospital Length of Stay: 0 days  Discharge Date and Time: 04/17/2025 9:58 AM  Attending Physician: No att. providers found   Discharging Provider: Martinez Beard NP  Primary Care Provider: [facility]    Primary Care Team: Networked reference to record PCT     HPI:   79 year old pt getting admitted with acute bronchitis with wheezing / Viral syndrome  Pt started having URTI like symptoms yesterday  Later he had fever/myalgia and chills  This was followed by SOB along with wheeze associated with cough   He said his wife was diagnosed with flu recently   Pt is an ex smoker   Symptoms went worse and he came to ER and got admitted       * No surgery found *      Hospital Course:   During the hospitalization who was admitted to [facility] for evaluation and management of acute bronchitis with the presence of wheezing.  He was found to be positive with influenza.  He was febrile during the stay however this is expected with influenza.  He did not develop a leukocytosis, his wheezing resolved and he was no longer in any distress whatsoever.  He admitted to having a 30 year history of smoking however has not smoked in the last several years.  There was nothing acute seen on chest imaging. Because of this it is assumed that he has some type of underlying COPD so he was empirically covered with antibiotic and a short course of steroid on discharge.  He was seen and examined on the day of discharge was medically stable     Goals of Care Treatment Preferences:  Code Status: Full Code         Consults:     Final Active Diagnoses:    Diagnosis Date Noted POA    Primary hypertension [I10] 03/08/2024 Yes    Type 2 diabetes mellitus without complication, without long-term current use of insulin [E11.9] 03/07/2024 Yes    CAD  (coronary artery disease) [I25.10] 08/27/2023 Yes      Problems Resolved During this Admission:    Diagnosis Date Noted Date Resolved POA    PRINCIPAL PROBLEM:  Acute bronchitis with wheezing [J20.9] 04/16/2025 04/17/2025 Yes       Discharged Condition: stable    Disposition: Home or Self Care    Follow Up:   Follow-up Information       Clinic, [facility] Follow up.    Specialty: Internal Medicine  Why: Patient to call office to schedule.  Contact information:  [address]  [facility] [address]  [facility] [address] [phone]                           Patient Instructions:   No discharge procedures on file.    Significant Diagnostic Studies: N/A    Pending Diagnostic Studies:       None           Medications:  Reconciled Home Medications:      Medication List        START taking these medications      azithromycin 250 MG tablet  Commonly known as: Z-TERI  Take 2 tablets by mouth on day 1; Take 1 tablet by mouth on days 2-5     oseltamivir 75 MG capsule  Commonly known as: TAMIFLU  Take 1 capsule (75 mg total) by mouth 2 (two) times daily. for 5 days     predniSONE 20 MG tablet  Commonly known as: DELTASONE  Take 2 tablets (40 mg total) by mouth once daily. for 5 days            CONTINUE taking these medications      acetaminophen 500 MG tablet  Commonly known as: TYLENOL  Take 500 mg by mouth every 6 (six) hours as needed for Pain.     amLODIPine 10 MG tablet  Commonly known as: NORVASC  Take 5 mg by mouth once daily.     aspirin 81 MG EC tablet  Commonly known as: ECOTRIN  Take 81 mg by mouth once daily.     atorvastatin 80 MG tablet  Commonly known as: LIPITOR  Take 80 mg by mouth once daily.     empagliflozin 25 mg tablet  Commonly known as: Jardiance  Take 12.5 mg by mouth once daily.     ibuprofen 200 MG tablet  Commonly known as: ADVIL,MOTRIN  Take 200 mg by mouth every 6 (six) hours as needed for Pain.     metoprolol succinate 25 MG 24 hr tablet  Commonly known as: TOPROL-XL  Take 25 mg by mouth once  daily.     naloxone 4 mg/actuation Spry  Commonly known as: NARCAN  1 spray by Nasal route once.     oxyCODONE-acetaminophen 5-325 mg per tablet  Commonly known as: PERCOCET  Take 1 tablet by mouth every 6 (six) hours as needed for Pain.     sildenafiL 100 MG tablet  Commonly known as: VIAGRA  Take 100 mg by mouth as needed for Erectile Dysfunction.     VITAMIN D3 50 mcg (2,000 unit) Cap capsule  Generic drug: cholecalciferol (vitamin D3)  Take 1 capsule by mouth every other day.              Indwelling Lines/Drains at time of discharge:   Lines/Drains/Airways       None                   Time spent on the discharge of patient: 33 minutes         Martinez Beard NP  Department of Hospital Medicine  [facility]

## 2025-04-17 NOTE — HOSPITAL COURSE
During the hospitalization who was admitted to [facility] for evaluation and management of acute bronchitis with the presence of wheezing.  He was found to be positive with influenza.  He was febrile during the stay however this is expected with influenza.  He did not develop a leukocytosis, his wheezing resolved and he was no longer in any distress whatsoever.  He admitted to having a 30 year history of smoking however has not smoked in the last several years.  There was nothing acute seen on chest imaging. Because of this it is assumed that he has some type of underlying COPD so he was empirically covered with antibiotic and a short course of steroid on discharge.  He was seen and examined on the day of discharge was medically stable

## 2025-04-17 NOTE — ASSESSMENT & PLAN NOTE
Mostly influenzae/Viral syndrome  Rapid flu negative   Will order Respiratory panel PCR  Check UDS  Also Rapid Strep A  Nebs and iv steroids  Iv fluids  Will start on Tamiflu at the moment  Droplet precautions  If PCR shows no evidence of Influenzae then tamiflu has to be DC ed

## 2025-04-17 NOTE — H&P
[facility] - Emergency Dept  Hospital Medicine  History & Physical    Patient Name: Arcadio Begum  MRN: [id number]  Patient Class: OP- Observation  Admission Date: 4/16/2025  Attending Physician: Dylan Rivero MD   Primary Care Provider: [facility]         Patient information was obtained from patient, past medical records, ER records, and ER MD  .     Subjective:     Principal Problem:Acute bronchitis with wheezing    Chief Complaint:   Chief Complaint   Patient presents with    Fever    Weakness    Influenza        HPI: 79 year old pt getting admitted with acute bronchitis with wheezing / Viral syndrome  Pt started having URTI like symptoms yesterday  Later he had fever/myalgia and chills  This was followed by SOB along with wheeze associated with cough   He said his wife was diagnosed with flu recently   Pt is an ex smoker   Symptoms went worse and he came to ER and got admitted       Past Medical History:   Diagnosis Date    Left leg pain 03/07/2024       Past Surgical History:   Procedure Laterality Date    CYSTOSCOPY         Review of patient's allergies indicates:  No Known Allergies    No current facility-administered medications on file prior to encounter.     Current Outpatient Medications on File Prior to Encounter   Medication Sig    naloxone (NARCAN) 4 mg/actuation Spry 1 spray by Nasal route once.    sildenafiL (VIAGRA) 100 MG tablet Take 100 mg by mouth as needed for Erectile Dysfunction.    acetaminophen (TYLENOL) 500 MG tablet Take 500 mg by mouth every 6 (six) hours as needed for Pain.    amLODIPine (NORVASC) 10 MG tablet Take 5 mg by mouth once daily.    aspirin (ECOTRIN) 81 MG EC tablet Take 81 mg by mouth once daily.    atorvastatin (LIPITOR) 80 MG tablet Take 80 mg by mouth once daily.    cholecalciferol, vitamin D3, (VITAMIN D3) 50 mcg (2,000 unit) Cap capsule Take 1 capsule by mouth every other day.    empagliflozin (JARDIANCE) 25 mg tablet Take 12.5 mg by mouth  once daily.    ibuprofen (ADVIL,MOTRIN) 200 MG tablet Take 200 mg by mouth every 6 (six) hours as needed for Pain.    metoprolol succinate (TOPROL-XL) 25 MG 24 hr tablet Take 25 mg by mouth once daily.    oxyCODONE-acetaminophen (PERCOCET) 5-325 mg per tablet Take 1 tablet by mouth every 6 (six) hours as needed for Pain.     Family History       Problem Relation (Age of Onset)    Hypertension Mother          Tobacco Use    Smoking status: Not on file    Smokeless tobacco: Not on file   Substance and Sexual Activity    Alcohol use: Not Currently    Drug use: Not on file    Sexual activity: Not Currently     Review of Systems   Constitutional:  Negative for activity change and appetite change.   HENT:  Negative for congestion and dental problem.    Eyes:  Negative for discharge and itching.   Respiratory:  Positive for cough and shortness of breath.    Cardiovascular:  Negative for chest pain.   Gastrointestinal:  Negative for abdominal distention and abdominal pain.   Endocrine: Negative for cold intolerance.   Genitourinary:  Negative for difficulty urinating and dysuria.   Musculoskeletal:  Negative for arthralgias and back pain.   Skin:  Negative for color change.   Neurological:  Negative for dizziness and facial asymmetry.   Hematological:  Negative for adenopathy.   Psychiatric/Behavioral:  Negative for agitation and behavioral problems.      Objective:     Vital Signs (Most Recent):  Temp: 98.2 °F (36.8 °C) (04/16/25 2119)  Pulse: 77 (04/16/25 2123)  Resp: 18 (04/16/25 2123)  BP: 139/75 (04/16/25 2119)  SpO2: 95 % (04/16/25 2123) Vital Signs (24h Range):  Temp:  [98.2 °F (36.8 °C)-98.8 °F (37.1 °C)] 98.2 °F (36.8 °C)  Pulse:  [73-80] 77  Resp:  [16-20] 18  SpO2:  [92 %-97 %] 95 %  BP: (113-139)/(67-82) 139/75     Weight: 98.4 kg (217 lb)  Body mass index is 35.02 kg/m².     Physical Exam  Vitals and nursing note reviewed.   Constitutional:       Appearance: He is well-developed.   HENT:      Head: Atraumatic.       Right Ear: External ear normal.      Left Ear: External ear normal.      Nose: Nose normal.      Mouth/Throat:      Mouth: Mucous membranes are dry.   Eyes:      Extraocular Movements: Extraocular movements intact.   Cardiovascular:      Rate and Rhythm: Normal rate.   Pulmonary:      Effort: Pulmonary effort is normal.      Breath sounds: Wheezing present.   Abdominal:      Palpations: Abdomen is soft.   Musculoskeletal:         General: Normal range of motion.      Cervical back: Full passive range of motion without pain and normal range of motion.   Skin:     General: Skin is warm.   Neurological:      Mental Status: He is alert and oriented to person, place, and time.   Psychiatric:         Behavior: Behavior normal.                Significant Labs: All pertinent labs within the past 24 hours have been reviewed.  CBC:   Recent Labs   Lab 04/16/25 1817 04/16/25  1904   WBC 9.42  --    HGB 15.2  --    HCT 46.2 47     --      CMP:   Recent Labs   Lab 04/16/25 1817   *   K 3.6   CO2 22*   BUN 15   CREATININE 0.9   CALCIUM 9.4   ALBUMIN 4.2   BILITOT 0.7   ALKPHOS 77   AST 14   ALT 14       Significant Imaging: I have reviewed all pertinent imaging results/findings within the past 24 hours.    Assessment/Plan:     Assessment & Plan  Acute bronchitis with wheezing  Mostly influenzae/Viral syndrome  Rapid flu negative   Will order Respiratory panel PCR  Check UDS  Also Rapid Strep A  Nebs and iv steroids  Iv fluids  Will start on Tamiflu at the moment  Droplet precautions  If PCR shows no evidence of Influenzae then tamiflu has to be DC ed     CAD (coronary artery disease)  H/o aware   Type 2 diabetes mellitus without complication, without long-term current use of insulin  Maintain present insulin regime    Primary hypertension  Patient's blood pressure range in the last 24 hours was: BP  Min: 113/69  Max: 139/75.The patient's inpatient anti-hypertensive regimen is listed below:  Current  Antihypertensives  amLODIPine tablet 5 mg, Daily, Oral  metoprolol succinate (TOPROL-XL) 24 hr tablet 25 mg, Daily, Oral        VTE Risk Mitigation (From admission, onward)           Ordered     enoxaparin injection 40 mg  Daily         04/16/25 2137     IP VTE HIGH RISK PATIENT  Once         04/16/25 2137     Place sequential compression device  Until discontinued         04/16/25 2137                       On 04/16/2025, patient should be placed in hospital observation services under my care.             Dylan Rivero MD  Department of Hospital Medicine  [facility] - Emergency Dept

## 2025-04-17 NOTE — PLAN OF CARE
Patient cleared for discharge by case management to home.        04/17/25 0945   Final Note   Assessment Type Final Discharge Note   Anticipated Discharge Disposition Home   Hospital Resources/Appts/Education Provided Appointments scheduled and added to AVS

## 2025-04-17 NOTE — SUBJECTIVE & OBJECTIVE
Past Medical History:   Diagnosis Date    Left leg pain 03/07/2024       Past Surgical History:   Procedure Laterality Date    CYSTOSCOPY         Review of patient's allergies indicates:  No Known Allergies    No current facility-administered medications on file prior to encounter.     Current Outpatient Medications on File Prior to Encounter   Medication Sig    naloxone (NARCAN) 4 mg/actuation Spry 1 spray by Nasal route once.    sildenafiL (VIAGRA) 100 MG tablet Take 100 mg by mouth as needed for Erectile Dysfunction.    acetaminophen (TYLENOL) 500 MG tablet Take 500 mg by mouth every 6 (six) hours as needed for Pain.    amLODIPine (NORVASC) 10 MG tablet Take 5 mg by mouth once daily.    aspirin (ECOTRIN) 81 MG EC tablet Take 81 mg by mouth once daily.    atorvastatin (LIPITOR) 80 MG tablet Take 80 mg by mouth once daily.    cholecalciferol, vitamin D3, (VITAMIN D3) 50 mcg (2,000 unit) Cap capsule Take 1 capsule by mouth every other day.    empagliflozin (JARDIANCE) 25 mg tablet Take 12.5 mg by mouth once daily.    ibuprofen (ADVIL,MOTRIN) 200 MG tablet Take 200 mg by mouth every 6 (six) hours as needed for Pain.    metoprolol succinate (TOPROL-XL) 25 MG 24 hr tablet Take 25 mg by mouth once daily.    oxyCODONE-acetaminophen (PERCOCET) 5-325 mg per tablet Take 1 tablet by mouth every 6 (six) hours as needed for Pain.     Family History       Problem Relation (Age of Onset)    Hypertension Mother          Tobacco Use    Smoking status: Not on file    Smokeless tobacco: Not on file   Substance and Sexual Activity    Alcohol use: Not Currently    Drug use: Not on file    Sexual activity: Not Currently     Review of Systems   Constitutional:  Negative for activity change and appetite change.   HENT:  Negative for congestion and dental problem.    Eyes:  Negative for discharge and itching.   Respiratory:  Positive for cough and shortness of breath.    Cardiovascular:  Negative for chest pain.   Gastrointestinal:   Negative for abdominal distention and abdominal pain.   Endocrine: Negative for cold intolerance.   Genitourinary:  Negative for difficulty urinating and dysuria.   Musculoskeletal:  Negative for arthralgias and back pain.   Skin:  Negative for color change.   Neurological:  Negative for dizziness and facial asymmetry.   Hematological:  Negative for adenopathy.   Psychiatric/Behavioral:  Negative for agitation and behavioral problems.      Objective:     Vital Signs (Most Recent):  Temp: 98.2 °F (36.8 °C) (04/16/25 2119)  Pulse: 77 (04/16/25 2123)  Resp: 18 (04/16/25 2123)  BP: 139/75 (04/16/25 2119)  SpO2: 95 % (04/16/25 2123) Vital Signs (24h Range):  Temp:  [98.2 °F (36.8 °C)-98.8 °F (37.1 °C)] 98.2 °F (36.8 °C)  Pulse:  [73-80] 77  Resp:  [16-20] 18  SpO2:  [92 %-97 %] 95 %  BP: (113-139)/(67-82) 139/75     Weight: 98.4 kg (217 lb)  Body mass index is 35.02 kg/m².     Physical Exam  Vitals and nursing note reviewed.   Constitutional:       Appearance: He is well-developed.   HENT:      Head: Atraumatic.      Right Ear: External ear normal.      Left Ear: External ear normal.      Nose: Nose normal.      Mouth/Throat:      Mouth: Mucous membranes are dry.   Eyes:      Extraocular Movements: Extraocular movements intact.   Cardiovascular:      Rate and Rhythm: Normal rate.   Pulmonary:      Effort: Pulmonary effort is normal.      Breath sounds: Wheezing present.   Abdominal:      Palpations: Abdomen is soft.   Musculoskeletal:         General: Normal range of motion.      Cervical back: Full passive range of motion without pain and normal range of motion.   Skin:     General: Skin is warm.   Neurological:      Mental Status: He is alert and oriented to person, place, and time.   Psychiatric:         Behavior: Behavior normal.                Significant Labs: All pertinent labs within the past 24 hours have been reviewed.  CBC:   Recent Labs   Lab 04/16/25  1817 04/16/25  1904   WBC 9.42  --    HGB 15.2  --    HCT  46.2 47     --      CMP:   Recent Labs   Lab 04/16/25  1817   *   K 3.6   CO2 22*   BUN 15   CREATININE 0.9   CALCIUM 9.4   ALBUMIN 4.2   BILITOT 0.7   ALKPHOS 77   AST 14   ALT 14       Significant Imaging: I have reviewed all pertinent imaging results/findings within the past 24 hours.

## 2025-04-17 NOTE — NURSING
Patients IV removed. Monitor removed and returned. Discharge instructions reviewed with AVS nurse. Patient transferred downstairs via wheelchair where spouse awaits for patients discharge home.

## 2025-04-17 NOTE — PLAN OF CARE
04/17/25 0944   Discharge Planning   Assessment Type Discharge Planning Brief Assessment   Resource/Environmental Concerns none   Support Systems Spouse/significant other   Equipment Currently Used at Home none   Current Living Arrangements home   Patient/Family Anticipates Transition to home   Patient/Family Anticipated Services at Transition none   DME Needed Upon Discharge  none   Discharge Plan A Home   Discharge Plan B Home

## 2025-04-17 NOTE — ASSESSMENT & PLAN NOTE
Patient's blood pressure range in the last 24 hours was: BP  Min: 113/69  Max: 139/75.The patient's inpatient anti-hypertensive regimen is listed below:  Current Antihypertensives  amLODIPine tablet 5 mg, Daily, Oral  metoprolol succinate (TOPROL-XL) 24 hr tablet 25 mg, Daily, Oral

## 2025-04-17 NOTE — ASSESSMENT & PLAN NOTE
Patient's blood pressure range in the last 24 hours was: BP  Min: 113/69  Max: 139/69.The patient's inpatient anti-hypertensive regimen is listed below:  Current Antihypertensives  amLODIPine tablet 5 mg, Daily, Oral  metoprolol succinate (TOPROL-XL) 24 hr tablet 25 mg, Daily, Oral